# Patient Record
Sex: MALE | Race: WHITE | NOT HISPANIC OR LATINO | Employment: FULL TIME | URBAN - METROPOLITAN AREA
[De-identification: names, ages, dates, MRNs, and addresses within clinical notes are randomized per-mention and may not be internally consistent; named-entity substitution may affect disease eponyms.]

---

## 2017-08-02 ENCOUNTER — OFFICE VISIT (OUTPATIENT)
Dept: NEUROLOGY | Facility: CLINIC | Age: 42
End: 2017-08-02

## 2017-08-02 VITALS
HEART RATE: 71 BPM | BODY MASS INDEX: 35.71 KG/M2 | WEIGHT: 249.4 LBS | HEIGHT: 70 IN | DIASTOLIC BLOOD PRESSURE: 86 MMHG | OXYGEN SATURATION: 98 % | SYSTOLIC BLOOD PRESSURE: 120 MMHG

## 2017-08-02 DIAGNOSIS — G47.33 OBSTRUCTIVE APNEA: Primary | ICD-10-CM

## 2017-08-02 PROCEDURE — 99212 OFFICE O/P EST SF 10 MIN: CPT | Performed by: PSYCHIATRY & NEUROLOGY

## 2017-08-02 RX ORDER — ALLOPURINOL 100 MG/1
TABLET ORAL
Refills: 0 | COMMUNITY
Start: 2017-06-13

## 2017-08-02 RX ORDER — RANITIDINE 150 MG/1
TABLET ORAL
Refills: 0 | COMMUNITY
Start: 2017-06-13 | End: 2021-01-07

## 2017-08-02 NOTE — PROGRESS NOTES
"Subjective:     Patient ID: Jono Mcintyre is a 41 y.o. male.    History of Present Illness     41 y.o.  male with JOANA returns in follow up.  Last visit 6/9/16 renewed CPAP mask and supplies.    Needs new DME provider due to vendor going out of business.  Wt is stable in last year.  Compliant with CPAP and receiving.  Using daily for an average of 7 to 8 hours a night.       The following portions of the patient's history were reviewed and updated as appropriate: allergies, current medications, past medical history, past surgical history and problem list.    Review of Systems   Constitutional: Positive for fatigue. Negative for activity change and unexpected weight change.   HENT: Negative for facial swelling, hearing loss, tinnitus, trouble swallowing and voice change.    Eyes: Negative for photophobia, pain and visual disturbance.   Respiratory: Negative for apnea, cough and choking.    Cardiovascular: Negative for chest pain.   Gastrointestinal: Negative for constipation, nausea and vomiting.   Endocrine: Negative for cold intolerance.   Genitourinary: Negative for difficulty urinating, frequency and urgency.   Musculoskeletal: Negative for arthralgias, back pain, gait problem, myalgias, neck pain and neck stiffness.   Skin: Negative for rash.   Allergic/Immunologic: Negative for immunocompromised state.   Neurological: Negative for dizziness, tremors, seizures, syncope, facial asymmetry, speech difficulty, weakness, light-headedness, numbness and headaches.   Hematological: Negative for adenopathy.   Psychiatric/Behavioral: Negative for confusion, decreased concentration, hallucinations and sleep disturbance. The patient is not nervous/anxious.         Objective:  Vitals:    08/02/17 0909   BP: 120/86   Pulse: 71   SpO2: 98%   Weight: 249 lb 6.4 oz (113 kg)   Height: 70\" (177.8 cm)           Neurologic Exam     Mental Status   Level of consciousness: alert  Knowledge: good and consistent with education. "   Normal comprehension.     Cranial Nerves   Cranial nerves II through XII intact.     CN II   Visual fields full to confrontation.   Visual acuity: normal  Right visual field deficit: none  Left visual field deficit: none     CN III, IV, VI   Nystagmus: none   Diplopia: none  Ophthalmoparesis: none  Upgaze: normal  Downgaze: normal  Conjugate gaze: present    CN V   Facial sensation intact.   Right corneal reflex: normal  Left corneal reflex: normal    CN VII   Right facial weakness: none  Left facial weakness: none    CN VIII   Hearing: intact    CN IX, X   Palate: symmetric  Right gag reflex: normal  Left gag reflex: normal    CN XI   Right sternocleidomastoid strength: normal  Left sternocleidomastoid strength: normal    CN XII   Tongue: not atrophic  Fasciculations: absent  Tongue deviation: none    Motor Exam   Muscle bulk: normal  Overall muscle tone: normal    Sensory Exam   Light touch normal.     Gait, Coordination, and Reflexes     Tremor   Resting tremor: absent  Intention tremor: absent  Action tremor: absent    Reflexes   Reflexes 2+ except as noted.       Physical Exam    Assessment/Plan:       Problems Addressed this Visit        Respiratory    Obstructive apnea - Primary     Compliant with CPAP and receiving benefit    Rx for new mask and supplies

## 2017-12-11 ENCOUNTER — TELEPHONE (OUTPATIENT)
Dept: NEUROLOGY | Facility: CLINIC | Age: 42
End: 2017-12-11

## 2017-12-11 NOTE — TELEPHONE ENCOUNTER
----- Message from Kassy Grace sent at 12/11/2017 11:51 AM EST -----  Regarding: PHYSICIAN ORDER  Contact: [phone]  Please send over new order for cpap supplies

## 2017-12-11 NOTE — TELEPHONE ENCOUNTER
Called patient and wanted to see where he gets his cpap supplies from. Leave message to return my call

## 2019-01-07 ENCOUNTER — OFFICE VISIT (OUTPATIENT)
Dept: NEUROLOGY | Facility: CLINIC | Age: 44
End: 2019-01-07

## 2019-01-07 VITALS
HEART RATE: 58 BPM | DIASTOLIC BLOOD PRESSURE: 90 MMHG | WEIGHT: 260 LBS | SYSTOLIC BLOOD PRESSURE: 132 MMHG | OXYGEN SATURATION: 98 % | HEIGHT: 70 IN | BODY MASS INDEX: 37.22 KG/M2 | RESPIRATION RATE: 20 BRPM

## 2019-01-07 DIAGNOSIS — G47.33 OBSTRUCTIVE APNEA: Primary | ICD-10-CM

## 2019-01-07 PROCEDURE — 99212 OFFICE O/P EST SF 10 MIN: CPT | Performed by: PSYCHIATRY & NEUROLOGY

## 2019-01-07 NOTE — PROGRESS NOTES
"Subjective:   Chief Complaint   Patient presents with   • Apnea       Patient ID: Jono Mcintyre is a 43 y.o. male.    History of Present Illness     43 y.o.  male with JOANA returns in follow up.  Last visit 8/2/167 renewed CPAP mask and supplies.    Pt concerned machine not generating enough pressure.      Using CPAP daily for 8 hours a day.      The following portions of the patient's history were reviewed and updated as appropriate: allergies, current medications, past medical history, past surgical history and problem list.    Review of Systems   Constitutional: Positive for fatigue. Negative for activity change and unexpected weight change.   HENT: Negative for facial swelling, hearing loss, tinnitus, trouble swallowing and voice change.    Eyes: Negative for photophobia, pain and visual disturbance.   Respiratory: Negative for apnea, cough and choking.    Cardiovascular: Negative for chest pain.   Gastrointestinal: Negative for constipation, nausea and vomiting.   Endocrine: Negative for cold intolerance.   Genitourinary: Negative for difficulty urinating, frequency and urgency.   Musculoskeletal: Negative for arthralgias, back pain, gait problem, myalgias, neck pain and neck stiffness.   Skin: Negative for rash.   Allergic/Immunologic: Negative for immunocompromised state.   Neurological: Negative for dizziness, tremors, seizures, syncope, facial asymmetry, speech difficulty, weakness, light-headedness, numbness and headaches.   Hematological: Negative for adenopathy.   Psychiatric/Behavioral: Positive for sleep disturbance. Negative for confusion, decreased concentration and hallucinations. The patient is not nervous/anxious.         Objective:  Vitals:    01/07/19 1127   BP: 132/90   Pulse: 58   Resp: 20   SpO2: 98%   Weight: 118 kg (260 lb)   Height: 177.8 cm (70\")           Neurologic Exam     Mental Status   Level of consciousness: alert  Knowledge: good and consistent with education.   Normal " comprehension.     Cranial Nerves   Cranial nerves II through XII intact.     CN II   Visual fields full to confrontation.   Visual acuity: normal  Right visual field deficit: none  Left visual field deficit: none     CN III, IV, VI   Nystagmus: none   Diplopia: none  Ophthalmoparesis: none  Upgaze: normal  Downgaze: normal  Conjugate gaze: present    CN V   Facial sensation intact.   Right corneal reflex: normal  Left corneal reflex: normal    CN VII   Right facial weakness: none  Left facial weakness: none    CN VIII   Hearing: intact    CN IX, X   Palate: symmetric  Right gag reflex: normal  Left gag reflex: normal    CN XI   Right sternocleidomastoid strength: normal  Left sternocleidomastoid strength: normal    CN XII   Tongue: not atrophic  Fasciculations: absent  Tongue deviation: none    Motor Exam   Muscle bulk: normal  Overall muscle tone: normal    Sensory Exam   Light touch normal.     Gait, Coordination, and Reflexes     Tremor   Resting tremor: absent  Intention tremor: absent  Action tremor: absent    Reflexes   Reflexes 2+ except as noted.       Physical Exam    Assessment/Plan:       Problems Addressed this Visit        Respiratory    Obstructive apnea - Primary     Send in order for new CPAP machine and supplies

## 2020-01-07 ENCOUNTER — OFFICE VISIT (OUTPATIENT)
Dept: NEUROLOGY | Facility: CLINIC | Age: 45
End: 2020-01-07

## 2020-01-07 VITALS
DIASTOLIC BLOOD PRESSURE: 90 MMHG | WEIGHT: 256 LBS | OXYGEN SATURATION: 98 % | HEIGHT: 70 IN | HEART RATE: 70 BPM | BODY MASS INDEX: 36.65 KG/M2 | SYSTOLIC BLOOD PRESSURE: 138 MMHG

## 2020-01-07 DIAGNOSIS — G47.33 OBSTRUCTIVE APNEA: Primary | ICD-10-CM

## 2020-01-07 PROCEDURE — 99213 OFFICE O/P EST LOW 20 MIN: CPT | Performed by: NURSE PRACTITIONER

## 2020-01-07 RX ORDER — FLUTICASONE PROPIONATE 50 MCG
SPRAY, SUSPENSION (ML) NASAL
COMMUNITY

## 2020-01-07 RX ORDER — CETIRIZINE HYDROCHLORIDE 10 MG/1
TABLET ORAL
COMMUNITY

## 2020-01-07 NOTE — PROGRESS NOTES
"Subjective:   Chief Complaint   Patient presents with   • Sleeping Problem     Obstructive Sleep Apnea; 1yr follow up         Patient ID: Jono Mcintyre is a 44 y.o. male.    History of Present Illness     44 y.o.  male with JOANA returns in follow up.  Last visit 1/7/19 renewed CPAP mask and supplies.  Current machine unknown age. Home health was previous supplier, now uses [facility].     Using CPAP daily for 8 hours a night, feeling rested during the day.      The following portions of the patient's history were reviewed and updated as appropriate: allergies, current medications, past medical history, past surgical history and problem list.    Review of Systems   Constitutional: Positive for fatigue. Negative for activity change and unexpected weight change.   HENT: Negative for facial swelling, hearing loss, tinnitus, trouble swallowing and voice change.    Eyes: Negative for photophobia, pain and visual disturbance.   Respiratory: Negative for apnea, cough and choking.    Cardiovascular: Negative for chest pain.   Gastrointestinal: Negative for constipation, nausea and vomiting.   Endocrine: Negative for cold intolerance.   Genitourinary: Negative for difficulty urinating, frequency and urgency.   Musculoskeletal: Negative for arthralgias, back pain, gait problem, myalgias, neck pain and neck stiffness.   Skin: Negative for rash.   Allergic/Immunologic: Negative for immunocompromised state.   Neurological: Negative for dizziness, tremors, seizures, syncope, facial asymmetry, speech difficulty, weakness, light-headedness, numbness and headaches.   Hematological: Negative for adenopathy.   Psychiatric/Behavioral: Positive for sleep disturbance. Negative for confusion, decreased concentration and hallucinations. The patient is not nervous/anxious.         Objective:  Vitals:    01/07/20 0857   BP: 138/90   Pulse: 70   SpO2: 98%   Weight: 116 kg (256 lb)   Height: 177.8 cm (70\")           Neurologic Exam "     Mental Status   Oriented to person, place, and time.   Level of consciousness: alert  Knowledge: good and consistent with education.   Normal comprehension.     Cranial Nerves   Cranial nerves II through XII intact.     CN II   Visual fields full to confrontation.   Visual acuity: normal  Right visual field deficit: none  Left visual field deficit: none     CN III, IV, VI   Nystagmus: none   Diplopia: none  Ophthalmoparesis: none  Upgaze: normal  Downgaze: normal  Conjugate gaze: present    CN V   Facial sensation intact.   Right corneal reflex: normal  Left corneal reflex: normal    CN VII   Right facial weakness: none  Left facial weakness: none    CN VIII   Hearing: intact    CN IX, X   Palate: symmetric  Right gag reflex: normal  Left gag reflex: normal    CN XI   Right sternocleidomastoid strength: normal  Left sternocleidomastoid strength: normal    CN XII   Tongue: not atrophic  Fasciculations: absent  Tongue deviation: none    Motor Exam   Muscle bulk: normal  Overall muscle tone: normal    Sensory Exam   Light touch normal.     Gait, Coordination, and Reflexes     Tremor   Resting tremor: absent  Intention tremor: absent  Action tremor: absent    Reflexes   Reflexes 2+ except as noted.       Physical Exam   Constitutional: He is oriented to person, place, and time. He appears well-developed and well-nourished.   HENT:   Head: Normocephalic and atraumatic.   Mouth/Throat: Uvula is midline.   Pulmonary/Chest: Effort normal.   Neurological: He is alert and oriented to person, place, and time.   Skin: Skin is warm and dry.   Psychiatric: He has a normal mood and affect. His behavior is normal.   Nursing note and vitals reviewed.      Assessment/Plan:       Problems Addressed this Visit        Respiratory    Obstructive apnea - Primary     Refilled mask and supplies                 Return in about 1 year (around 1/7/2021).

## 2020-09-12 ENCOUNTER — HOSPITAL ENCOUNTER (INPATIENT)
Facility: HOSPITAL | Age: 45
LOS: 5 days | Discharge: HOME HEALTH | End: 2020-09-18
Attending: EMERGENCY MEDICINE | Admitting: FAMILY MEDICINE

## 2020-09-12 DIAGNOSIS — R50.9 FEVER, UNSPECIFIED FEVER CAUSE: ICD-10-CM

## 2020-09-12 DIAGNOSIS — R06.00 DYSPNEA AND RESPIRATORY ABNORMALITIES: ICD-10-CM

## 2020-09-12 DIAGNOSIS — U07.1 COVID-19 VIRUS INFECTION: Primary | ICD-10-CM

## 2020-09-12 DIAGNOSIS — R09.02 HYPOXIA: ICD-10-CM

## 2020-09-12 DIAGNOSIS — R06.89 DYSPNEA AND RESPIRATORY ABNORMALITIES: ICD-10-CM

## 2020-09-12 DIAGNOSIS — J18.9 MULTIFOCAL PNEUMONIA: ICD-10-CM

## 2020-09-12 PROCEDURE — 99284 EMERGENCY DEPT VISIT MOD MDM: CPT

## 2020-09-12 RX ORDER — ACETAMINOPHEN 500 MG
1000 TABLET ORAL ONCE
Status: COMPLETED | OUTPATIENT
Start: 2020-09-12 | End: 2020-09-13

## 2020-09-13 ENCOUNTER — APPOINTMENT (OUTPATIENT)
Dept: CT IMAGING | Facility: HOSPITAL | Age: 45
End: 2020-09-13

## 2020-09-13 PROBLEM — K21.9 GERD WITHOUT ESOPHAGITIS: Status: ACTIVE | Noted: 2020-09-13

## 2020-09-13 PROBLEM — U07.1 COVID-19 VIRUS INFECTION: Status: ACTIVE | Noted: 2020-09-13

## 2020-09-13 PROBLEM — N17.9 AKI (ACUTE KIDNEY INJURY) (HCC): Status: ACTIVE | Noted: 2020-09-13

## 2020-09-13 PROBLEM — I10 HTN (HYPERTENSION): Status: ACTIVE | Noted: 2020-09-13

## 2020-09-13 LAB
ABO GROUP BLD: NORMAL
ABO GROUP BLD: NORMAL
ADV 40+41 DNA STL QL NAA+NON-PROBE: NOT DETECTED
ALBUMIN SERPL-MCNC: 4.1 G/DL (ref 3.5–5.2)
ALBUMIN SERPL-MCNC: 4.5 G/DL (ref 3.5–5.2)
ALBUMIN/GLOB SERPL: 1.2 G/DL
ALBUMIN/GLOB SERPL: 1.2 G/DL
ALP SERPL-CCNC: 61 U/L (ref 39–117)
ALP SERPL-CCNC: 65 U/L (ref 39–117)
ALT SERPL W P-5'-P-CCNC: 36 U/L (ref 1–41)
ALT SERPL W P-5'-P-CCNC: 39 U/L (ref 1–41)
ANION GAP SERPL CALCULATED.3IONS-SCNC: 13 MMOL/L (ref 5–15)
ANION GAP SERPL CALCULATED.3IONS-SCNC: 17 MMOL/L (ref 5–15)
AST SERPL-CCNC: 47 U/L (ref 1–40)
AST SERPL-CCNC: 48 U/L (ref 1–40)
ASTRO TYP 1-8 RNA STL QL NAA+NON-PROBE: NOT DETECTED
B PARAPERT DNA SPEC QL NAA+PROBE: NOT DETECTED
B PERT DNA SPEC QL NAA+PROBE: NOT DETECTED
BASOPHILS # BLD AUTO: 0 10*3/MM3 (ref 0–0.2)
BASOPHILS # BLD AUTO: 0.01 10*3/MM3 (ref 0–0.2)
BASOPHILS NFR BLD AUTO: 0 % (ref 0–1.5)
BASOPHILS NFR BLD AUTO: 0.1 % (ref 0–1.5)
BILIRUB SERPL-MCNC: 0.9 MG/DL (ref 0–1.2)
BILIRUB SERPL-MCNC: 1 MG/DL (ref 0–1.2)
BILIRUB UR QL STRIP: NEGATIVE
BLD GP AB SCN SERPL QL: NEGATIVE
BUN SERPL-MCNC: 19 MG/DL (ref 6–20)
BUN SERPL-MCNC: 19 MG/DL (ref 6–20)
BUN/CREAT SERPL: 13.7 (ref 7–25)
BUN/CREAT SERPL: 14 (ref 7–25)
C CAYETANENSIS DNA STL QL NAA+NON-PROBE: NOT DETECTED
C PNEUM DNA NPH QL NAA+NON-PROBE: NOT DETECTED
CALCIUM SPEC-SCNC: 9.2 MG/DL (ref 8.6–10.5)
CALCIUM SPEC-SCNC: 9.5 MG/DL (ref 8.6–10.5)
CAMPY SP DNA.DIARRHEA STL QL NAA+PROBE: NOT DETECTED
CHLORIDE SERPL-SCNC: 93 MMOL/L (ref 98–107)
CHLORIDE SERPL-SCNC: 98 MMOL/L (ref 98–107)
CLARITY UR: CLEAR
CO2 SERPL-SCNC: 20 MMOL/L (ref 22–29)
CO2 SERPL-SCNC: 22 MMOL/L (ref 22–29)
COLOR UR: YELLOW
CREAT SERPL-MCNC: 1.36 MG/DL (ref 0.76–1.27)
CREAT SERPL-MCNC: 1.39 MG/DL (ref 0.76–1.27)
CRP SERPL-MCNC: 17.3 MG/DL (ref 0–0.5)
CRYPTOSP STL CULT: NOT DETECTED
D DIMER PPP FEU-MCNC: 0.8 MCGFEU/ML (ref 0–0.56)
D-LACTATE SERPL-SCNC: 1 MMOL/L (ref 0.5–2)
D-LACTATE SERPL-SCNC: 2.1 MMOL/L (ref 0.5–2)
DEPRECATED RDW RBC AUTO: 41.6 FL (ref 37–54)
DEPRECATED RDW RBC AUTO: 41.9 FL (ref 37–54)
E COLI DNA SPEC QL NAA+PROBE: NOT DETECTED
E HISTOLYT AG STL-ACNC: NOT DETECTED
EAEC PAA PLAS AGGR+AATA ST NAA+NON-PRB: NOT DETECTED
EC STX1 + STX2 GENES STL NAA+PROBE: NOT DETECTED
EOSINOPHIL # BLD AUTO: 0 10*3/MM3 (ref 0–0.4)
EOSINOPHIL # BLD AUTO: 0 10*3/MM3 (ref 0–0.4)
EOSINOPHIL NFR BLD AUTO: 0 % (ref 0.3–6.2)
EOSINOPHIL NFR BLD AUTO: 0 % (ref 0.3–6.2)
EPEC EAE GENE STL QL NAA+NON-PROBE: NOT DETECTED
ERYTHROCYTE [DISTWIDTH] IN BLOOD BY AUTOMATED COUNT: 13.5 % (ref 12.3–15.4)
ERYTHROCYTE [DISTWIDTH] IN BLOOD BY AUTOMATED COUNT: 13.6 % (ref 12.3–15.4)
ETEC LTA+ST1A+ST1B TOX ST NAA+NON-PROBE: NOT DETECTED
FERRITIN SERPL-MCNC: 957.2 NG/ML (ref 30–400)
FLUAV H1 2009 PAND RNA NPH QL NAA+PROBE: NOT DETECTED
FLUAV H1 HA GENE NPH QL NAA+PROBE: NOT DETECTED
FLUAV H3 RNA NPH QL NAA+PROBE: NOT DETECTED
FLUAV SUBTYP SPEC NAA+PROBE: NOT DETECTED
FLUBV RNA ISLT QL NAA+PROBE: NOT DETECTED
G LAMBLIA DNA SPEC QL NAA+PROBE: NOT DETECTED
GFR SERPL CREATININE-BSD FRML MDRD: 55 ML/MIN/1.73
GFR SERPL CREATININE-BSD FRML MDRD: 57 ML/MIN/1.73
GLOBULIN UR ELPH-MCNC: 3.4 GM/DL
GLOBULIN UR ELPH-MCNC: 3.8 GM/DL
GLUCOSE SERPL-MCNC: 159 MG/DL (ref 65–99)
GLUCOSE SERPL-MCNC: 164 MG/DL (ref 65–99)
GLUCOSE UR STRIP-MCNC: NEGATIVE MG/DL
HADV DNA SPEC NAA+PROBE: NOT DETECTED
HCOV 229E RNA SPEC QL NAA+PROBE: NOT DETECTED
HCOV HKU1 RNA SPEC QL NAA+PROBE: NOT DETECTED
HCOV NL63 RNA SPEC QL NAA+PROBE: NOT DETECTED
HCOV OC43 RNA SPEC QL NAA+PROBE: NOT DETECTED
HCT VFR BLD AUTO: 39.2 % (ref 37.5–51)
HCT VFR BLD AUTO: 43.4 % (ref 37.5–51)
HGB BLD-MCNC: 13.1 G/DL (ref 13–17.7)
HGB BLD-MCNC: 14.1 G/DL (ref 13–17.7)
HGB UR QL STRIP.AUTO: NEGATIVE
HMPV RNA NPH QL NAA+NON-PROBE: NOT DETECTED
HPIV1 RNA SPEC QL NAA+PROBE: NOT DETECTED
HPIV2 RNA SPEC QL NAA+PROBE: NOT DETECTED
HPIV3 RNA NPH QL NAA+PROBE: NOT DETECTED
HPIV4 P GENE NPH QL NAA+PROBE: NOT DETECTED
IMM GRANULOCYTES # BLD AUTO: 0.03 10*3/MM3 (ref 0–0.05)
IMM GRANULOCYTES # BLD AUTO: 0.03 10*3/MM3 (ref 0–0.05)
IMM GRANULOCYTES NFR BLD AUTO: 0.3 % (ref 0–0.5)
IMM GRANULOCYTES NFR BLD AUTO: 0.4 % (ref 0–0.5)
KETONES UR QL STRIP: ABNORMAL
LACTATE HOLD SPECIMEN: NORMAL
LDH SERPL-CCNC: 318 U/L (ref 135–225)
LEUKOCYTE ESTERASE UR QL STRIP.AUTO: NEGATIVE
LYMPHOCYTES # BLD AUTO: 0.65 10*3/MM3 (ref 0.7–3.1)
LYMPHOCYTES # BLD AUTO: 1.01 10*3/MM3 (ref 0.7–3.1)
LYMPHOCYTES NFR BLD AUTO: 12 % (ref 19.6–45.3)
LYMPHOCYTES NFR BLD AUTO: 7.5 % (ref 19.6–45.3)
M PNEUMO IGG SER IA-ACNC: NOT DETECTED
MCH RBC QN AUTO: 27.5 PG (ref 26.6–33)
MCH RBC QN AUTO: 28 PG (ref 26.6–33)
MCHC RBC AUTO-ENTMCNC: 32.5 G/DL (ref 31.5–35.7)
MCHC RBC AUTO-ENTMCNC: 33.4 G/DL (ref 31.5–35.7)
MCV RBC AUTO: 83.8 FL (ref 79–97)
MCV RBC AUTO: 84.8 FL (ref 79–97)
MONOCYTES # BLD AUTO: 0.28 10*3/MM3 (ref 0.1–0.9)
MONOCYTES # BLD AUTO: 0.33 10*3/MM3 (ref 0.1–0.9)
MONOCYTES NFR BLD AUTO: 3.2 % (ref 5–12)
MONOCYTES NFR BLD AUTO: 3.9 % (ref 5–12)
NEUTROPHILS NFR BLD AUTO: 7.07 10*3/MM3 (ref 1.7–7)
NEUTROPHILS NFR BLD AUTO: 7.69 10*3/MM3 (ref 1.7–7)
NEUTROPHILS NFR BLD AUTO: 83.7 % (ref 42.7–76)
NEUTROPHILS NFR BLD AUTO: 88.9 % (ref 42.7–76)
NITRITE UR QL STRIP: NEGATIVE
NOROVIRUS GI+II RNA STL QL NAA+NON-PROBE: NOT DETECTED
NRBC BLD AUTO-RTO: 0 /100 WBC (ref 0–0.2)
NRBC BLD AUTO-RTO: 0 /100 WBC (ref 0–0.2)
P SHIGELLOIDES DNA STL QL NAA+PROBE: NOT DETECTED
PH UR STRIP.AUTO: 6 [PH] (ref 5–8)
PLATELET # BLD AUTO: 156 10*3/MM3 (ref 140–450)
PLATELET # BLD AUTO: 165 10*3/MM3 (ref 140–450)
PMV BLD AUTO: 11.5 FL (ref 6–12)
PMV BLD AUTO: 11.8 FL (ref 6–12)
POTASSIUM SERPL-SCNC: 3.8 MMOL/L (ref 3.5–5.2)
POTASSIUM SERPL-SCNC: 4 MMOL/L (ref 3.5–5.2)
PROCALCITONIN SERPL-MCNC: 1.11 NG/ML (ref 0–0.25)
PROT SERPL-MCNC: 7.5 G/DL (ref 6–8.5)
PROT SERPL-MCNC: 8.3 G/DL (ref 6–8.5)
PROT UR QL STRIP: ABNORMAL
RBC # BLD AUTO: 4.68 10*6/MM3 (ref 4.14–5.8)
RBC # BLD AUTO: 5.12 10*6/MM3 (ref 4.14–5.8)
RH BLD: POSITIVE
RH BLD: POSITIVE
RHINOVIRUS RNA SPEC NAA+PROBE: NOT DETECTED
RSV RNA NPH QL NAA+NON-PROBE: NOT DETECTED
RV RNA STL NAA+PROBE: NOT DETECTED
SALMONELLA DNA SPEC QL NAA+PROBE: NOT DETECTED
SAPO I+II+IV+V RNA STL QL NAA+NON-PROBE: NOT DETECTED
SARS-COV-2 RNA NPH QL NAA+NON-PROBE: DETECTED
SHIGELLA SP+EIEC IPAH STL QL NAA+PROBE: NOT DETECTED
SODIUM SERPL-SCNC: 131 MMOL/L (ref 136–145)
SODIUM SERPL-SCNC: 132 MMOL/L (ref 136–145)
SP GR UR STRIP: 1.05 (ref 1–1.03)
T&S EXPIRATION DATE: NORMAL
UROBILINOGEN UR QL STRIP: ABNORMAL
V CHOLERAE DNA SPEC QL NAA+PROBE: NOT DETECTED
VIBRIO DNA SPEC NAA+PROBE: NOT DETECTED
WBC # BLD AUTO: 8.44 10*3/MM3 (ref 3.4–10.8)
WBC # BLD AUTO: 8.66 10*3/MM3 (ref 3.4–10.8)
YERSINIA STL CULT: NOT DETECTED

## 2020-09-13 PROCEDURE — 0097U HC BIOFIRE FILMARRAY GI PANEL: CPT | Performed by: NURSE PRACTITIONER

## 2020-09-13 PROCEDURE — 84145 PROCALCITONIN (PCT): CPT | Performed by: EMERGENCY MEDICINE

## 2020-09-13 PROCEDURE — 86900 BLOOD TYPING SEROLOGIC ABO: CPT | Performed by: NURSE PRACTITIONER

## 2020-09-13 PROCEDURE — 85379 FIBRIN DEGRADATION QUANT: CPT | Performed by: EMERGENCY MEDICINE

## 2020-09-13 PROCEDURE — 0 IOPAMIDOL PER 1 ML: Performed by: EMERGENCY MEDICINE

## 2020-09-13 PROCEDURE — 83615 LACTATE (LD) (LDH) ENZYME: CPT | Performed by: EMERGENCY MEDICINE

## 2020-09-13 PROCEDURE — 81003 URINALYSIS AUTO W/O SCOPE: CPT | Performed by: INTERNAL MEDICINE

## 2020-09-13 PROCEDURE — 25010000002 DEXAMETHASONE SODIUM PHOSPHATE 20 MG/5ML SOLUTION: Performed by: EMERGENCY MEDICINE

## 2020-09-13 PROCEDURE — 82728 ASSAY OF FERRITIN: CPT | Performed by: EMERGENCY MEDICINE

## 2020-09-13 PROCEDURE — 85025 COMPLETE CBC W/AUTO DIFF WBC: CPT | Performed by: NURSE PRACTITIONER

## 2020-09-13 PROCEDURE — 25010000002 HEPARIN (PORCINE) PER 1000 UNITS: Performed by: NURSE PRACTITIONER

## 2020-09-13 PROCEDURE — 86140 C-REACTIVE PROTEIN: CPT | Performed by: EMERGENCY MEDICINE

## 2020-09-13 PROCEDURE — 83605 ASSAY OF LACTIC ACID: CPT | Performed by: EMERGENCY MEDICINE

## 2020-09-13 PROCEDURE — 86900 BLOOD TYPING SEROLOGIC ABO: CPT

## 2020-09-13 PROCEDURE — 86901 BLOOD TYPING SEROLOGIC RH(D): CPT | Performed by: NURSE PRACTITIONER

## 2020-09-13 PROCEDURE — 83520 IMMUNOASSAY QUANT NOS NONAB: CPT | Performed by: INTERNAL MEDICINE

## 2020-09-13 PROCEDURE — 85025 COMPLETE CBC W/AUTO DIFF WBC: CPT | Performed by: EMERGENCY MEDICINE

## 2020-09-13 PROCEDURE — 25010000002 ENOXAPARIN PER 10 MG: Performed by: INTERNAL MEDICINE

## 2020-09-13 PROCEDURE — 99223 1ST HOSP IP/OBS HIGH 75: CPT | Performed by: INTERNAL MEDICINE

## 2020-09-13 PROCEDURE — 80053 COMPREHEN METABOLIC PANEL: CPT | Performed by: NURSE PRACTITIONER

## 2020-09-13 PROCEDURE — 80053 COMPREHEN METABOLIC PANEL: CPT | Performed by: EMERGENCY MEDICINE

## 2020-09-13 PROCEDURE — 86901 BLOOD TYPING SEROLOGIC RH(D): CPT

## 2020-09-13 PROCEDURE — 86850 RBC ANTIBODY SCREEN: CPT | Performed by: NURSE PRACTITIONER

## 2020-09-13 PROCEDURE — 0202U NFCT DS 22 TRGT SARS-COV-2: CPT | Performed by: INTERNAL MEDICINE

## 2020-09-13 PROCEDURE — 71275 CT ANGIOGRAPHY CHEST: CPT

## 2020-09-13 RX ORDER — SODIUM CHLORIDE 0.9 % (FLUSH) 0.9 %
10 SYRINGE (ML) INJECTION AS NEEDED
Status: DISCONTINUED | OUTPATIENT
Start: 2020-09-13 | End: 2020-09-18 | Stop reason: HOSPADM

## 2020-09-13 RX ORDER — HEPARIN SODIUM 5000 [USP'U]/ML
5000 INJECTION, SOLUTION INTRAVENOUS; SUBCUTANEOUS EVERY 8 HOURS SCHEDULED
Status: DISCONTINUED | OUTPATIENT
Start: 2020-09-13 | End: 2020-09-13

## 2020-09-13 RX ORDER — ASCORBIC ACID 500 MG
1000 TABLET ORAL DAILY
Status: DISCONTINUED | OUTPATIENT
Start: 2020-09-13 | End: 2020-09-13

## 2020-09-13 RX ORDER — ASCORBIC ACID 500 MG
1000 TABLET ORAL 4 TIMES DAILY
Status: DISCONTINUED | OUTPATIENT
Start: 2020-09-13 | End: 2020-09-18 | Stop reason: HOSPADM

## 2020-09-13 RX ORDER — SODIUM CHLORIDE 9 MG/ML
75 INJECTION, SOLUTION INTRAVENOUS CONTINUOUS
Status: ACTIVE | OUTPATIENT
Start: 2020-09-13 | End: 2020-09-13

## 2020-09-13 RX ORDER — DEXAMETHASONE SODIUM PHOSPHATE 4 MG/ML
6 INJECTION, SOLUTION INTRA-ARTICULAR; INTRALESIONAL; INTRAMUSCULAR; INTRAVENOUS; SOFT TISSUE DAILY
Status: DISCONTINUED | OUTPATIENT
Start: 2020-09-14 | End: 2020-09-13

## 2020-09-13 RX ORDER — ACETAMINOPHEN 325 MG/1
325 TABLET ORAL ONCE
Status: COMPLETED | OUTPATIENT
Start: 2020-09-14 | End: 2020-09-13

## 2020-09-13 RX ORDER — BENZONATATE 100 MG/1
200 CAPSULE ORAL 3 TIMES DAILY PRN
Status: DISCONTINUED | OUTPATIENT
Start: 2020-09-13 | End: 2020-09-18 | Stop reason: HOSPADM

## 2020-09-13 RX ORDER — FAMOTIDINE 20 MG/1
20 TABLET, FILM COATED ORAL
Status: DISCONTINUED | OUTPATIENT
Start: 2020-09-13 | End: 2020-09-18 | Stop reason: HOSPADM

## 2020-09-13 RX ORDER — SODIUM CHLORIDE 0.9 % (FLUSH) 0.9 %
10 SYRINGE (ML) INJECTION EVERY 12 HOURS SCHEDULED
Status: DISCONTINUED | OUTPATIENT
Start: 2020-09-13 | End: 2020-09-18 | Stop reason: HOSPADM

## 2020-09-13 RX ORDER — CHOLECALCIFEROL (VITAMIN D3) 125 MCG
10 CAPSULE ORAL ONCE
Status: COMPLETED | OUTPATIENT
Start: 2020-09-13 | End: 2020-09-13

## 2020-09-13 RX ORDER — ACETAMINOPHEN 160 MG/5ML
650 SOLUTION ORAL EVERY 4 HOURS PRN
Status: DISCONTINUED | OUTPATIENT
Start: 2020-09-13 | End: 2020-09-18 | Stop reason: HOSPADM

## 2020-09-13 RX ORDER — ACETAMINOPHEN 325 MG/1
650 TABLET ORAL EVERY 4 HOURS PRN
Status: DISCONTINUED | OUTPATIENT
Start: 2020-09-13 | End: 2020-09-18 | Stop reason: HOSPADM

## 2020-09-13 RX ORDER — DEXAMETHASONE 4 MG/1
6 TABLET ORAL
Status: DISCONTINUED | OUTPATIENT
Start: 2020-09-14 | End: 2020-09-14

## 2020-09-13 RX ORDER — ACETAMINOPHEN 650 MG/1
650 SUPPOSITORY RECTAL EVERY 4 HOURS PRN
Status: DISCONTINUED | OUTPATIENT
Start: 2020-09-13 | End: 2020-09-18 | Stop reason: HOSPADM

## 2020-09-13 RX ORDER — DEXAMETHASONE IN 0.9 % SOD CHL 10 MG/50ML
10 INTRAVENOUS SOLUTION, PIGGYBACK (ML) INTRAVENOUS ONCE
Status: COMPLETED | OUTPATIENT
Start: 2020-09-13 | End: 2020-09-13

## 2020-09-13 RX ORDER — FAMOTIDINE 20 MG/1
20 TABLET, FILM COATED ORAL DAILY
Status: DISCONTINUED | OUTPATIENT
Start: 2020-09-13 | End: 2020-09-13

## 2020-09-13 RX ADMIN — ENOXAPARIN SODIUM 40 MG: 40 INJECTION SUBCUTANEOUS at 12:11

## 2020-09-13 RX ADMIN — OXYCODONE HYDROCHLORIDE AND ACETAMINOPHEN 1000 MG: 500 TABLET ORAL at 19:49

## 2020-09-13 RX ADMIN — IOPAMIDOL 100 ML: 755 INJECTION, SOLUTION INTRAVENOUS at 02:04

## 2020-09-13 RX ADMIN — BENZONATATE 200 MG: 100 CAPSULE ORAL at 08:40

## 2020-09-13 RX ADMIN — ACETAMINOPHEN 500 MG: 500 TABLET, FILM COATED ORAL at 02:54

## 2020-09-13 RX ADMIN — HEPARIN SODIUM 5000 UNITS: 5000 INJECTION, SOLUTION INTRAVENOUS; SUBCUTANEOUS at 05:24

## 2020-09-13 RX ADMIN — OXYCODONE HYDROCHLORIDE AND ACETAMINOPHEN 1000 MG: 500 TABLET ORAL at 12:11

## 2020-09-13 RX ADMIN — OXYCODONE HYDROCHLORIDE AND ACETAMINOPHEN 1000 MG: 500 TABLET ORAL at 17:16

## 2020-09-13 RX ADMIN — SODIUM CHLORIDE, PRESERVATIVE FREE 10 ML: 5 INJECTION INTRAVENOUS at 08:44

## 2020-09-13 RX ADMIN — MELATONIN TAB 5 MG 10 MG: 5 TAB at 21:34

## 2020-09-13 RX ADMIN — SODIUM CHLORIDE 75 ML/HR: 9 INJECTION, SOLUTION INTRAVENOUS at 05:17

## 2020-09-13 RX ADMIN — FAMOTIDINE 20 MG: 20 TABLET, FILM COATED ORAL at 08:40

## 2020-09-13 RX ADMIN — BENZONATATE 200 MG: 100 CAPSULE ORAL at 19:49

## 2020-09-13 RX ADMIN — SODIUM CHLORIDE 75 ML/HR: 9 INJECTION, SOLUTION INTRAVENOUS at 12:11

## 2020-09-13 RX ADMIN — ACETAMINOPHEN 650 MG: 325 TABLET, FILM COATED ORAL at 19:49

## 2020-09-13 RX ADMIN — FAMOTIDINE 20 MG: 20 TABLET, FILM COATED ORAL at 17:16

## 2020-09-13 RX ADMIN — ACETAMINOPHEN 325 MG: 325 TABLET, FILM COATED ORAL at 23:43

## 2020-09-13 RX ADMIN — DEXAMETHASONE SODIUM PHOSPHATE 10 MG: 4 INJECTION, SOLUTION INTRA-ARTICULAR; INTRALESIONAL; INTRAMUSCULAR; INTRAVENOUS; SOFT TISSUE at 02:54

## 2020-09-13 NOTE — ED PROVIDER NOTES
EMERGENCY DEPARTMENT ENCOUNTER      Pt Name: Jono Mcintyre  MRN: [id number]  YOB: 1975  Date of evaluation: 9/12/2020  Provider: Lior Pablo DO    CHIEF COMPLAINT       Chief Complaint   Patient presents with   • COVID POSITIVE         HISTORY OF PRESENT ILLNESS  (Location/Symptom, Timing/Onset, Context/Setting, Quality, Duration, Modifying Factors, Severity.)   Jono Mcintyre is a 44 y.o. male who presents to the emergency department for evaluation of mild shortness of breath, recent diagnosis of coronavirus as he had the results of a positive finding yesterday.  The patient states over the last few days he has had issues with sleeping secondary to continued fever, overall not feeling well.  He does have a sleep apnea and states that using his machine at night he has not been able to get a good amount of sleep, concerned secondary to this.  Denies any shortness of breath, nausea loss of taste or smell.  Denies any respiratory issues in the past.  Is a non-smoker.  He does not wear home oxygen.  He denies any chest pain, hemoptysis, significant respiratory distress or abdominal pain.  He has had multiple loose stools over the last few days.  Otherwise the patient states he is doing well.  He denies any other acute systemic complaints this time.      Nursing notes were reviewed.    REVIEW OF SYSTEMS    (2-9 systems for level 4, 10 or more for level 5)   ROS:  General:  + fevers,  chills, no weakness  Cardiovascular:  No chest pain, no palpitations  Respiratory:  No shortness of breath, + cough, no wheezing  Gastrointestinal:  No pain, no nausea, no vomiting, no diarrhea  Musculoskeletal:  No muscle pain, no joint pain  Skin:  No rash, no easy bruising  Neurologic:  No speech problems, no headache, no extremity numbness, no extremity tingling, no extremity weakness  Psychiatric:  No anxiety  Genitourinary:  No dysuria, no hematuria    Except as noted above the remainder of the review of  systems was reviewed and negative.       PAST MEDICAL HISTORY     Past Medical History:   Diagnosis Date   • Hyperlipidemia    • Hypertension          SURGICAL HISTORY     History reviewed. No pertinent surgical history.      CURRENT MEDICATIONS       Current Facility-Administered Medications:   •  acetaminophen (TYLENOL) tablet 1,000 mg, 1,000 mg, Oral, Once, Lior Pablo,   •  Dexamethasone Sod Phos-NaCl 10-0.9 MG/50ML-% IVPB solution 10 mg, 10 mg, Intravenous, Once, Lior Pablo,     Current Outpatient Medications:   •  allopurinol (ZYLOPRIM) 100 MG tablet, , Disp: , Rfl: 0  •  benazepril (LOTENSIN) 20 MG tablet, Take 20 mg by mouth daily., Disp: , Rfl:   •  cetirizine (ZYRTEC ALLERGY) 10 MG tablet, Zyrtec 10 mg tablet  Take 1 tablet every day by oral route., Disp: , Rfl:   •  fluticasone (FLONASE ALLERGY RELIEF) 50 MCG/ACT nasal spray, Flonase Allergy Relief 50 mcg/actuation nasal spray,suspension  Spray 1 spray every day by intranasal route as needed for 7 days., Disp: , Rfl:   •  raNITIdine (ZANTAC) 150 MG tablet, , Disp: , Rfl: 0    ALLERGIES     No known drug allergy    FAMILY HISTORY       Family History   Problem Relation Age of Onset   • Hypertension Father    • Hypertension Other    • Heart disease Other    • Stroke Other    • Diabetes Other    • Alzheimer's disease Other           SOCIAL HISTORY       Social History     Socioeconomic History   • Marital status:      Spouse name: Not on file   • Number of children: Not on file   • Years of education: Not on file   • Highest education level: Not on file   Tobacco Use   • Smoking status: Never Smoker   • Smokeless tobacco: Never Used   Substance and Sexual Activity   • Alcohol use: Yes         PHYSICAL EXAM    (up to 7 for level 4, 8 or more for level 5)     Vitals:    09/12/20 2335 09/13/20 0000 09/13/20 0100 09/13/20 0114   BP: 146/79 149/79 (!) 149/108    BP Location: Right arm      Patient Position: Lying      Pulse: 95      "  Resp: 16      Temp: (!) 100.9 °F (38.3 °C)      TempSrc: Oral      SpO2: 95% 95% 93% 94%   Weight: 109 kg (241 lb)      Height: 177.8 cm (70\")          Physical Exam  General : Patient is awake, alert, oriented, in no acute distress, nontoxic appearing  HEENT: Pupils are equally round and reactive to light, EOMI, conjunctivae clear, sclerae white  Neck: Neck is supple, full range of motion, trachea midline  Cardiac: Heart regular rate, rhythm  Lungs: Lungs with no acute respiratory distress, no accessory muscle usage, no audible stridor or wheezing  Abdomen: Abdomen is soft, nondistended.  Musculoskeletal: 5 out of 5 strength in all 4 extremities.  No focal muscle deficits are appreciated  Neuro: Motor intact, sensory intact, level of consciousness is normal, GCS 15  Dermatology: Skin is warm and dry  Psych: Mentation is grossly normal, cognition is grossly normal. Affect is appropriate.      DIAGNOSTIC RESULTS     EKG: All EKG's are interpreted by the Emergency Department Physician who either signs or Co-signs this chart in the absence of a cardiologist.    No orders to display       RADIOLOGY:   Non-plain film images such as CT, Ultrasound and MRI are read by the radiologist. Plain radiographic images are visualized and preliminarily interpreted by the emergency physician with the below findings:      [] Radiologist's Report Reviewed:  CT Chest Pulmonary Embolism   Final Result   Bilateral multifocal pneumonia. No evidence of pulmonary embolism. No evidence of pleural or pericardial fluid. Hepatic steatosis is also seen.      Signer Name: Pablo Guerra MD    Signed: 9/13/2020 2:16 AM    Workstation Name: [id number]     [facility]            ED BEDSIDE ULTRASOUND:   Performed by ED Physician - none    LABS:    I have reviewed and interpreted all of the currently available lab results from this visit (if applicable):  Results for orders placed or performed during the hospital encounter of " 09/12/20   Comprehensive Metabolic Panel    Specimen: Blood   Result Value Ref Range    Glucose 164 (H) 65 - 99 mg/dL    BUN 19 6 - 20 mg/dL    Creatinine 1.39 (H) 0.76 - 1.27 mg/dL    Sodium 132 (L) 136 - 145 mmol/L    Potassium 4.0 3.5 - 5.2 mmol/L    Chloride 93 (L) 98 - 107 mmol/L    CO2 22.0 22.0 - 29.0 mmol/L    Calcium 9.5 8.6 - 10.5 mg/dL    Total Protein 8.3 6.0 - 8.5 g/dL    Albumin 4.50 3.50 - 5.20 g/dL    ALT (SGPT) 39 1 - 41 U/L    AST (SGOT) 47 (H) 1 - 40 U/L    Alkaline Phosphatase 65 39 - 117 U/L    Total Bilirubin 1.0 0.0 - 1.2 mg/dL    eGFR Non African Amer 55 (L) >60 mL/min/1.73    Globulin 3.8 gm/dL    A/G Ratio 1.2 g/dL    BUN/Creatinine Ratio 13.7 7.0 - 25.0    Anion Gap 17.0 (H) 5.0 - 15.0 mmol/L   Lactate Dehydrogenase    Specimen: Blood   Result Value Ref Range     (H) 135 - 225 U/L   Procalcitonin    Specimen: Blood   Result Value Ref Range    Procalcitonin 1.11 (H) 0.00 - 0.25 ng/mL   D-dimer, Quantitative    Specimen: Blood   Result Value Ref Range    D-Dimer, Quantitative 0.80 (H) 0.00 - 0.56 MCGFEU/mL   C-reactive Protein    Specimen: Blood   Result Value Ref Range    C-Reactive Protein 17.30 (H) 0.00 - 0.50 mg/dL   Lactic Acid, Plasma    Specimen: Blood   Result Value Ref Range    Lactate 2.1 (C) 0.5 - 2.0 mmol/L   Ferritin    Specimen: Blood   Result Value Ref Range    Ferritin 957.20 (H) 30.00 - 400.00 ng/mL   CBC Auto Differential    Specimen: Blood   Result Value Ref Range    WBC 8.44 3.40 - 10.80 10*3/mm3    RBC 5.12 4.14 - 5.80 10*6/mm3    Hemoglobin 14.1 13.0 - 17.7 g/dL    Hematocrit 43.4 37.5 - 51.0 %    MCV 84.8 79.0 - 97.0 fL    MCH 27.5 26.6 - 33.0 pg    MCHC 32.5 31.5 - 35.7 g/dL    RDW 13.5 12.3 - 15.4 %    RDW-SD 41.9 37.0 - 54.0 fl    MPV 11.8 6.0 - 12.0 fL    Platelets 165 140 - 450 10*3/mm3    Neutrophil % 83.7 (H) 42.7 - 76.0 %    Lymphocyte % 12.0 (L) 19.6 - 45.3 %    Monocyte % 3.9 (L) 5.0 - 12.0 %    Eosinophil % 0.0 (L) 0.3 - 6.2 %    Basophil % 0.0 0.0 -  "1.5 %    Immature Grans % 0.4 0.0 - 0.5 %    Neutrophils, Absolute 7.07 (H) 1.70 - 7.00 10*3/mm3    Lymphocytes, Absolute 1.01 0.70 - 3.10 10*3/mm3    Monocytes, Absolute 0.33 0.10 - 0.90 10*3/mm3    Eosinophils, Absolute 0.00 0.00 - 0.40 10*3/mm3    Basophils, Absolute 0.00 0.00 - 0.20 10*3/mm3    Immature Grans, Absolute 0.03 0.00 - 0.05 10*3/mm3    nRBC 0.0 0.0 - 0.2 /100 WBC        All other labs were within normal range or not returned as of this dictation.      EMERGENCY DEPARTMENT COURSE and DIFFERENTIAL DIAGNOSIS/MDM:   Vitals:    Vitals:    09/12/20 2335 09/13/20 0000 09/13/20 0100 09/13/20 0114   BP: 146/79 149/79 (!) 149/108    BP Location: Right arm      Patient Position: Lying      Pulse: 95      Resp: 16      Temp: (!) 100.9 °F (38.3 °C)      TempSrc: Oral      SpO2: 95% 95% 93% 94%   Weight: 109 kg (241 lb)      Height: 177.8 cm (70\")             Patient with recent positive exposure diagnosis with COVID-19.  Presents with a continued fever, generalized weakness difficulty with sleeping secondary to his underlying symptoms from the virus.  Is not have any acute respiratory distress, on room air the patient dock saturations are in the mid 90s.  Heart rate is stable, temperature 100.9.  We discussed secondary to his continued symptoms obtaining IV, labs, advanced imaging for further work-up and evaluation.  Lab work is consistent with underlying COVID-19 infection inflammatory process.  CT of the chest does not reveal any pulmonary embolism or dissection, it is consistent with COVID-19 multifocal pneumonia.  Patient DrWendy saturations ranged from 90 to 94%.  Secondary to his symptoms dyspnea and fever intermittent hypoxia the patient is at moderate risk, he would feel more comfortable in the hospital for further work-up treatments and monitoring.  I feel this is reasonable.  Case discussed with our hospitalist team for admission.        MEDICATIONS ADMINISTERED IN ED:  Medications   acetaminophen (TYLENOL) " tablet 1,000 mg (1,000 mg Oral Not Given 9/13/20 0006)   Dexamethasone Sod Phos-NaCl 10-0.9 MG/50ML-% IVPB solution 10 mg (has no administration in time range)   iopamidol (ISOVUE-370) 76 % injection 100 mL (100 mL Intravenous Given 9/13/20 0204)       PROCEDURES:  Procedures    CRITICAL CARE TIME    Total Critical Care time was 0 minutes, excluding separately reportable procedures.   There was a high probability of clinically significant/life threatening deterioration in the patient's condition which required my urgent intervention.      FINAL IMPRESSION      1. COVID-19 virus infection    2. Dyspnea and respiratory abnormalities    3. Hypoxia    4. Multifocal pneumonia    5. Fever, unspecified fever cause          DISPOSITION/PLAN     ED Disposition     ED Disposition Condition Comment    Decision to Admit            PATIENT REFERRED TO:  No follow-up provider specified.    DISCHARGE MEDICATIONS:     Medication List      ASK your doctor about these medications    allopurinol 100 MG tablet  Commonly known as: ZYLOPRIM     benazepril 20 MG tablet  Commonly known as: LOTENSIN     Flonase Allergy Relief 50 MCG/ACT nasal spray  Generic drug: fluticasone     raNITIdine 150 MG tablet  Commonly known as: ZANTAC     ZyrTEC Allergy 10 MG tablet  Generic drug: cetirizine                Comment: Please note this report has been produced using speech recognition software.      Lior Pablo DO  Attending Emergency Physician               Lior Pablo DO  09/13/20 [id number]

## 2020-09-13 NOTE — PLAN OF CARE
"  Problem: Adult Inpatient Plan of Care  Goal: Plan of Care Review  Outcome: Ongoing, Progressing  Flowsheets  Taken 9/13/2020 1940 by Kelly Chew, RN  Progress: no change  Outcome Summary: VSS, alert and oriented on room air. Pt without shortness of air (O2 >92%), refusing a CPAP since he \"has a mask at home and it isn't helping.\" Pt refused remdesivir and convalescent plasma, then requested for later today but informed that plasma only comes once a day. Pt and family upset because they \"need to know if he needs to get competent care at .\" Pt reeducated by charge nurse regarding oxygen therapy and availability of plasma, wife present via cell phone speaker phone. Pt verbalized understanding.  Taken 9/13/2020 0516 by Amberly Lima, RN  Plan of Care Reviewed With: patient     "

## 2020-09-13 NOTE — H&P
[facility] Medicine Services  HISTORY AND PHYSICAL    Patient Name: Jono Mcintyre  : 1975  MRN: [id number]  Primary Care Physician: Freddie Zarco MD  Date of admission: 2020      Subjective   Subjective     Chief Complaint:    Dyspnea.  (History obtained through phone)    HPI:  Jono Mcintyre is a 44 y.o. male with known history of recent COVID 19+ test, at [facility]- yesterday.  Presented to ED secondary to chief complaint of exertional dyspnea, dry cough fever, insomnia-symptoms started on Tuesday, progressively got worse, did not have any relief from fever.  Also could not tolerate his BiPAP machine at night for sleep apnea.  Also had diarrhea-without any nausea vomiting or abdominal cramping-denied any complaint of lightheadedness, generalized weakness, myalgia, no complaint of sore throat, no complaint of headache.  Likely sick contact at the baseball game as per patient.  Denies any complaint of chest pain, no bleeding per GI or , no focal neurological weakness,  Patient's oxygen sats around 93% on room air at rest.    In ER patient got Tylenol 1 g, dexamethasone 10 mg  Review of Systems   Complete ROS done, which is negative except as above and HPI.  Old records reviewed and summarized in PM hx      Personal History     Past Medical History:   Diagnosis Date   • Hyperlipidemia-not on any medication      • Hypertension- benazepril 20 mg p.o. daily    Gout-allopurinol as needed    GERD-Zantac 150 milligrams as needed    Obstructive sleep apnea-on CPAP.            History reviewed. No pertinent surgical history.    Family History: family history includes Alzheimer's disease in an other family member; Diabetes in an other family member; Heart disease in an other family member; Hypertension in his father and another family member; Stroke in an other family member. Otherwise pertinent FHx was reviewed and unremarkable.     Social History:  reports that he has  never smoked. He has never used smokeless tobacco. He reports current alcohol use.      Medications:  Available home medication information reviewed.  (Not in a hospital admission)      Allergies   Allergen Reactions   • No Known Drug Allergy        Objective   Objective     Vital Signs:   Temp:  [100.9 °F (38.3 °C)] 100.9 °F (38.3 °C)  Heart Rate:  [95] 95  Resp:  [16] 16- 92% on room air  BP: (146-149)/() 149/108        Physical Exam (not done physically, obtained from ED physician/nurse evaluation)    GENERAL-mild respiratory distress with exertion.  RS-bilateral basal coarse breath sounds  CVS- s1s2 Regular, tachycardic no murmur.  ABD-nontender, soft  EXT- no edema.  NEURO- AAO-3,, no gross sensory deficit, cranial nerves intact.  EYES- Conjunctivae are normal. Pupils are equal, round, and reactive to light. No scleral icterus.   ENT- no external ear nose lesions, mucosa dry.  NECK-supple, trachea midline  JOINTS/MSK- no deformity, no swelling.  SKIN- no rash , warm to touch.  PSYCHIATRIC- Normal mood and affect. Behavior is normal. Thought content normal.     Results Reviewed:  I have personally reviewed current lab and radiology data.    Results from last 7 days   Lab Units 09/13/20  0002   WBC 10*3/mm3 8.44   HEMOGLOBIN g/dL 14.1   HEMATOCRIT % 43.4   PLATELETS 10*3/mm3 165     Results from last 7 days   Lab Units 09/13/20  0002   SODIUM mmol/L 132*   POTASSIUM mmol/L 4.0   CHLORIDE mmol/L 93*   CO2 mmol/L 22.0   BUN mg/dL 19   CREATININE mg/dL 1.39*   GLUCOSE mg/dL 164*   CALCIUM mg/dL 9.5   ALT (SGPT) U/L 39   AST (SGOT) U/L 47*   LACTATE mmol/L 2.1*   PROCALCITONIN ng/mL 1.11*     Estimated Creatinine Clearance: 83 mL/min (A) (by C-G formula based on SCr of 1.39 mg/dL (H)).  Brief Urine Lab Results     None        Imaging Results (Last 24 Hours)     Procedure Component Value Units Date/Time    CT Chest Pulmonary Embolism [[id number]] Collected: 09/13/20 0216     Updated: 09/13/20 0218    Narrative:       CT CHEST PULMONARY EMBOLISM W CONTRAST    INDICATION:   Pneumonia with fever    TECHNIQUE:   CT angiogram of the chest with 100 cc of Isovue-300 IV contrast. 3-D reconstructions were obtained and reviewed.   Radiation dose reduction techniques included automated exposure control or exposure modulation based on body size. Count of known CT and  cardiac nuc med studies performed in previous 12 months: 0.     COMPARISON:   None available.    FINDINGS:   Chest images at mediastinal window show no adenopathy or effusions. There are no pulmonary artery filling defects to suggest emboli. Hepatic steatosis is noted. The CT angiographic images also show no evidence of emboli.    Chest images at lung window show multifocal groundglass infiltrates more prominent in the mid to lower lung fields and peripherally. Commonly reported imaging features of COVID-19 pneumonia are present. Other processes such as influenza pneumonia and  organizing pneumonia can cause a similar imaging pattern.      Impression:      Bilateral multifocal pneumonia. No evidence of pulmonary embolism. No evidence of pleural or pericardial fluid. Hepatic steatosis is also seen.    Signer Name: Pablo Guerra MD   Signed: 9/13/2020 2:16 AM   Workstation [facility]: [email or web address]    [facility]             Ekg-  CT chest PE protocol-bilateral multifocal pneumonia consistent with COVID-19 pneumonia.  LDH-318, ferritin 957, CRP 17, lactic acid 2.1, pro-Chema 1.1, d-dimer 0.8  WBC 8, hemoglobin 14, neutrophils 83, lymphocyte count 12  Glucose-164, sodium-132, potassium 3.2, BUN/creatinine 19/1.4, bicarb 22  LFTs-WNL, albumin 4.5,    Assessment/Plan   Active Hospital Problems    Diagnosis POA   • COVID-19 virus infection [U07.1] Yes   • YT (acute kidney injury) (CMS/HCC) [N17.9] Unknown   • HTN (hypertension) [I10] Unknown   • GERD without esophagitis [K21.9] Unknown       Assessment & Plan   Exertional dyspnea/cough/fever- none COVID-19 test  positive  -CT positive for most likely COVID-19 pneumonia.  - Continue oxygen supplementation with high flow nasal cannula since patient cannot sleep without BiPAP.  -Decadron started, patient did qualify for Remdesvir after discussion with the pharmacy-but patient wants to discuss with wife, did explain the importance of starting the medication early.  -ID consult.    Diarrhea-again likely secondary to viral illness-continue to monitor, denies any antibiotic use, IV fluids.    • Hyperlipidemia-not on any medication     • Hypertension- benazepril 20 mg p.o. daily- did hold secondary to TY    Gout-allopurinol as needed    GERD-Zantac 150 milligrams as needed    Obstructive sleep apnea-on CPAP.       DVT prophylaxis:    -TEDs/SCDs  -Lovenox    CODE STATUS:    Code Status and Medical Interventions:   Ordered at: 09/13/20 0314     Level Of Support Discussed With:    Patient     Code Status:    CPR     Medical Interventions (Level of Support Prior to Arrest):    Full         Admission Status:  I believe this patient meets inpatient criteria, due to need of treatment for his current pneumonia.    Electronically signed by Bharathi Recinos MD, 09/13/20, 2:36 AM EDT.

## 2020-09-13 NOTE — PROGRESS NOTES
[facility] Medicine Services  ADMISSION FOLLOW-UP NOTE          Patient admitted after midnight, H&P by my partner performed earlier on today's date reviewed.  Interim findings, labs, and charting also reviewed.        The [facility] Hospital Problem List has been managed and updated to include any new diagnoses:  Active Hospital Problems    Diagnosis  POA   • COVID-19 virus infection [U07.1]  Yes   • TY (acute kidney injury) (CMS/HCC) [N17.9]  Unknown   • HTN (hypertension) [I10]  Unknown   • GERD without esophagitis [K21.9]  Unknown      Resolved Hospital Problems   No resolved problems to display.       44-year-old male with history of hypertension, GERD, JOANA on BiPAP, presents with dyspnea on exertion, dry cough, fever admitted with COVID-19 pneumonia this morning he is to that he is doing okay, denies significant SOA is on room air.    COVID-19 pneumonia  -Repeat respiratory panel PCR with COVID-19 is positive here  -CTA chest shows classic peripheral, bilateral GGO consistent with bilateral multifocal pneumonia from, COVID-19  -Patient has mild elevated procalcitonin, low WBC, he is currently on room air, as such suspicion for bacterial pneumonia is low holding off antibiotics for now  -ID consulted, rec convalescent plasma and Remdesivir, however, patient is still debating on this, continue Decadron, Lovenox twice daily.    Renal insufficiency- baseline creatinine unknown currently 1.39, continue IV fluids    JOANA-continue home CPAP.    Hypertension-BP currently well controlled ACEi secondary to TY.    Otherwise continue plan of care per admission H&P    Electronically signed by Ara Ferguson MD, 09/13/20, 11:13 AM EDT.

## 2020-09-13 NOTE — PLAN OF CARE
Goal Outcome Evaluation:  Plan of Care Reviewed With: patient  Progress: improving  Outcome Summary: VSS. Patient arrived to unit around 0445. Patient is AxO x 4. No complaints of pain. Patient seems anxious about having covid, and scared something is going to happen to him. Patient stated he felt more comfortbale in the hospital then at home. Patient is on RA sating 95%. NSR. Heart rate in the 70s. Patient only complaint was loose stools whe he drinks any amount of liquid. Will continue to  monitor.

## 2020-09-14 PROBLEM — A41.9 SEPSIS (HCC): Status: ACTIVE | Noted: 2020-09-14

## 2020-09-14 LAB
ALBUMIN SERPL-MCNC: 4 G/DL (ref 3.5–5.2)
ALBUMIN/GLOB SERPL: 1.1 G/DL
ALP SERPL-CCNC: 59 U/L (ref 39–117)
ALT SERPL W P-5'-P-CCNC: 37 U/L (ref 1–41)
ANION GAP SERPL CALCULATED.3IONS-SCNC: 15 MMOL/L (ref 5–15)
AST SERPL-CCNC: 42 U/L (ref 1–40)
BASOPHILS # BLD AUTO: 0.01 10*3/MM3 (ref 0–0.2)
BASOPHILS NFR BLD AUTO: 0.1 % (ref 0–1.5)
BILIRUB SERPL-MCNC: 0.7 MG/DL (ref 0–1.2)
BUN SERPL-MCNC: 19 MG/DL (ref 6–20)
BUN/CREAT SERPL: 16 (ref 7–25)
CALCIUM SPEC-SCNC: 9.1 MG/DL (ref 8.6–10.5)
CHLORIDE SERPL-SCNC: 97 MMOL/L (ref 98–107)
CO2 SERPL-SCNC: 19 MMOL/L (ref 22–29)
CREAT SERPL-MCNC: 1.19 MG/DL (ref 0.76–1.27)
CREAT UR-MCNC: 25 MG/DL
CRP SERPL-MCNC: 14.77 MG/DL (ref 0–0.5)
D DIMER PPP FEU-MCNC: 0.88 MCGFEU/ML (ref 0–0.56)
D-LACTATE SERPL-SCNC: 1.4 MMOL/L (ref 0.5–2)
DEPRECATED RDW RBC AUTO: 42.1 FL (ref 37–54)
EOSINOPHIL # BLD AUTO: 0 10*3/MM3 (ref 0–0.4)
EOSINOPHIL NFR BLD AUTO: 0 % (ref 0.3–6.2)
ERYTHROCYTE [DISTWIDTH] IN BLOOD BY AUTOMATED COUNT: 13.6 % (ref 12.3–15.4)
ERYTHROCYTE [SEDIMENTATION RATE] IN BLOOD: 71 MM/HR (ref 0–15)
FERRITIN SERPL-MCNC: 1006 NG/ML (ref 30–400)
GFR SERPL CREATININE-BSD FRML MDRD: 66 ML/MIN/1.73
GLOBULIN UR ELPH-MCNC: 3.5 GM/DL
GLUCOSE SERPL-MCNC: 163 MG/DL (ref 65–99)
HCT VFR BLD AUTO: 40.1 % (ref 37.5–51)
HGB BLD-MCNC: 13.5 G/DL (ref 13–17.7)
IMM GRANULOCYTES # BLD AUTO: 0.08 10*3/MM3 (ref 0–0.05)
IMM GRANULOCYTES NFR BLD AUTO: 0.6 % (ref 0–0.5)
LDH SERPL-CCNC: 326 U/L (ref 135–225)
LDH SERPL-CCNC: 344 U/L (ref 135–225)
LYMPHOCYTES # BLD AUTO: 1.24 10*3/MM3 (ref 0.7–3.1)
LYMPHOCYTES NFR BLD AUTO: 9.4 % (ref 19.6–45.3)
MCH RBC QN AUTO: 28.4 PG (ref 26.6–33)
MCHC RBC AUTO-ENTMCNC: 33.7 G/DL (ref 31.5–35.7)
MCV RBC AUTO: 84.2 FL (ref 79–97)
MONOCYTES # BLD AUTO: 0.5 10*3/MM3 (ref 0.1–0.9)
MONOCYTES NFR BLD AUTO: 3.8 % (ref 5–12)
NEUTROPHILS NFR BLD AUTO: 11.35 10*3/MM3 (ref 1.7–7)
NEUTROPHILS NFR BLD AUTO: 86.1 % (ref 42.7–76)
NRBC BLD AUTO-RTO: 0 /100 WBC (ref 0–0.2)
OSMOLALITY UR: 135 MOSM/KG (ref 300–1100)
PLATELET # BLD AUTO: 218 10*3/MM3 (ref 140–450)
PMV BLD AUTO: 12.1 FL (ref 6–12)
POTASSIUM SERPL-SCNC: 3.9 MMOL/L (ref 3.5–5.2)
PROCALCITONIN SERPL-MCNC: 0.64 NG/ML (ref 0–0.25)
PROT SERPL-MCNC: 7.5 G/DL (ref 6–8.5)
RBC # BLD AUTO: 4.76 10*6/MM3 (ref 4.14–5.8)
SODIUM SERPL-SCNC: 131 MMOL/L (ref 136–145)
SODIUM UR-SCNC: <20 MMOL/L
WBC # BLD AUTO: 13.18 10*3/MM3 (ref 3.4–10.8)

## 2020-09-14 PROCEDURE — 82550 ASSAY OF CK (CPK): CPT | Performed by: INTERNAL MEDICINE

## 2020-09-14 PROCEDURE — 83605 ASSAY OF LACTIC ACID: CPT | Performed by: NURSE PRACTITIONER

## 2020-09-14 PROCEDURE — 25010000002 ENOXAPARIN PER 10 MG: Performed by: INTERNAL MEDICINE

## 2020-09-14 PROCEDURE — 94660 CPAP INITIATION&MGMT: CPT

## 2020-09-14 PROCEDURE — 82570 ASSAY OF URINE CREATININE: CPT | Performed by: INTERNAL MEDICINE

## 2020-09-14 PROCEDURE — 84145 PROCALCITONIN (PCT): CPT | Performed by: NURSE PRACTITIONER

## 2020-09-14 PROCEDURE — 99233 SBSQ HOSP IP/OBS HIGH 50: CPT | Performed by: INTERNAL MEDICINE

## 2020-09-14 PROCEDURE — 86140 C-REACTIVE PROTEIN: CPT | Performed by: NURSE PRACTITIONER

## 2020-09-14 PROCEDURE — 84300 ASSAY OF URINE SODIUM: CPT | Performed by: INTERNAL MEDICINE

## 2020-09-14 PROCEDURE — 83615 LACTATE (LD) (LDH) ENZYME: CPT | Performed by: INTERNAL MEDICINE

## 2020-09-14 PROCEDURE — 85025 COMPLETE CBC W/AUTO DIFF WBC: CPT | Performed by: NURSE PRACTITIONER

## 2020-09-14 PROCEDURE — 80053 COMPREHEN METABOLIC PANEL: CPT | Performed by: NURSE PRACTITIONER

## 2020-09-14 PROCEDURE — 82728 ASSAY OF FERRITIN: CPT | Performed by: NURSE PRACTITIONER

## 2020-09-14 PROCEDURE — 63710000001 DEXAMETHASONE PER 0.25 MG: Performed by: INTERNAL MEDICINE

## 2020-09-14 PROCEDURE — 94799 UNLISTED PULMONARY SVC/PX: CPT

## 2020-09-14 PROCEDURE — 85652 RBC SED RATE AUTOMATED: CPT | Performed by: NURSE PRACTITIONER

## 2020-09-14 PROCEDURE — 85379 FIBRIN DEGRADATION QUANT: CPT | Performed by: INTERNAL MEDICINE

## 2020-09-14 PROCEDURE — 83935 ASSAY OF URINE OSMOLALITY: CPT | Performed by: INTERNAL MEDICINE

## 2020-09-14 RX ORDER — QUETIAPINE FUMARATE 25 MG/1
25 TABLET, FILM COATED ORAL NIGHTLY
Status: DISCONTINUED | OUTPATIENT
Start: 2020-09-14 | End: 2020-09-16

## 2020-09-14 RX ORDER — GUAIFENESIN AND DEXTROMETHORPHAN HYDROBROMIDE 600; 30 MG/1; MG/1
1 TABLET, EXTENDED RELEASE ORAL 2 TIMES DAILY PRN
Status: DISCONTINUED | OUTPATIENT
Start: 2020-09-14 | End: 2020-09-18 | Stop reason: HOSPADM

## 2020-09-14 RX ORDER — SODIUM CHLORIDE 9 MG/ML
75 INJECTION, SOLUTION INTRAVENOUS CONTINUOUS
Status: DISCONTINUED | OUTPATIENT
Start: 2020-09-14 | End: 2020-09-14

## 2020-09-14 RX ADMIN — DEXAMETHASONE 6 MG: 4 TABLET ORAL at 08:06

## 2020-09-14 RX ADMIN — FAMOTIDINE 20 MG: 20 TABLET, FILM COATED ORAL at 04:30

## 2020-09-14 RX ADMIN — ACETAMINOPHEN 650 MG: 325 TABLET, FILM COATED ORAL at 17:09

## 2020-09-14 RX ADMIN — OXYCODONE HYDROCHLORIDE AND ACETAMINOPHEN 1000 MG: 500 TABLET ORAL at 14:10

## 2020-09-14 RX ADMIN — SODIUM CHLORIDE, PRESERVATIVE FREE 10 ML: 5 INJECTION INTRAVENOUS at 08:22

## 2020-09-14 RX ADMIN — BENZONATATE 200 MG: 100 CAPSULE ORAL at 21:29

## 2020-09-14 RX ADMIN — OXYCODONE HYDROCHLORIDE AND ACETAMINOPHEN 1000 MG: 500 TABLET ORAL at 08:06

## 2020-09-14 RX ADMIN — BENZONATATE 200 MG: 100 CAPSULE ORAL at 08:06

## 2020-09-14 RX ADMIN — SODIUM CHLORIDE, PRESERVATIVE FREE 10 ML: 5 INJECTION INTRAVENOUS at 21:29

## 2020-09-14 RX ADMIN — FAMOTIDINE 20 MG: 20 TABLET, FILM COATED ORAL at 17:50

## 2020-09-14 RX ADMIN — QUETIAPINE FUMARATE 25 MG: 25 TABLET ORAL at 21:29

## 2020-09-14 RX ADMIN — SODIUM CHLORIDE 75 ML/HR: 9 INJECTION, SOLUTION INTRAVENOUS at 08:21

## 2020-09-14 RX ADMIN — ACETAMINOPHEN 650 MG: 325 TABLET, FILM COATED ORAL at 08:06

## 2020-09-14 RX ADMIN — OXYCODONE HYDROCHLORIDE AND ACETAMINOPHEN 1000 MG: 500 TABLET ORAL at 17:50

## 2020-09-14 RX ADMIN — OXYCODONE HYDROCHLORIDE AND ACETAMINOPHEN 1000 MG: 500 TABLET ORAL at 21:29

## 2020-09-14 RX ADMIN — ACETAMINOPHEN 650 MG: 325 TABLET, FILM COATED ORAL at 04:30

## 2020-09-14 RX ADMIN — ENOXAPARIN SODIUM 40 MG: 40 INJECTION SUBCUTANEOUS at 08:07

## 2020-09-14 RX ADMIN — REMDESIVIR 200 MG: 100 INJECTION, POWDER, LYOPHILIZED, FOR SOLUTION INTRAVENOUS at 16:45

## 2020-09-14 NOTE — PLAN OF CARE
"Goal Outcome Evaluation:  Plan of Care Reviewed With: patient    Upon arrival on shift patient was very irritable and anxious called saying he needs, \"someone in his room now to fix his cpap so he can go to sleep .\"  I called respiratory to adjust his cpap for the settings tonight; they said they had just been in his room and had set him up but he wanted to make a call before he put on his cpap.  Patient verbalized aggravation about the reason he came to the hospital was so \"we could figure out why he wasn't sleeping.\"  Called Karlee ESTES, she ordered melatonin. Patient has been agitated about hearing all the alarms during the night and I gave him earplugs. He then verbalized that he could hear the bipap and needed it to be moved, I moved the cpap machine so he would not hear it.  He verbalized concerns about his care here.  I reviewed what the plan of care was and listened to his concerns. He asked, \"at what point do I go to the ICU?\"  I educated that patients going to the ICU have unstable vitals, are requiring new oxygen requirements and and require a high level of care.  Patient had a 100.2 fever, was given tylenol, recheck was 102, called Karlee ESTES, ordered additional 325mg tylenol. Turned up air conditioning, was given cool rags, ice water, temperature now is 100.2.  Updated provider of this recheck. Temp recheck this morning was 101.8, tylenol given.  Patient states he wants help planning an advance care directive. Consult put in.       called at 0600, asking if patient was unstable and needing her services because patient repeatedly calling  asking to write up advance directive.  Informed her of the patient's status and that I had put in a consult earlier but was unaware of repeated calls to .  Will notify patient that  is aware of need of services.    "

## 2020-09-14 NOTE — PROGRESS NOTES
"    [facility] Medicine Services  PROGRESS NOTE    Patient Name: Jono Mcintyre  : 1975  MRN: [id number]    Date of Admission: 2020  Primary Care Physician: Freddie Zarco MD    Subjective   Subjective     CC: Follow-up COVID-19 pneumonia    HPI:Patient states that he is not doing too well, was febrile last night, he states that he has not slept since Wednesday. Patient is complaining that he has received poor care since he has been here. \" he has been all alone, nurses not answering his calls.........    Review of Systems  Gen- +fevers, chills  CV- No chest pain, palpitations  Resp- No cough, dyspnea  GI- No N/V/D, abd pain     All other systems reviewed and currently negative    Objective   Objective     Vital Signs:   Temp:  [98.6 °F (37 °C)-102 °F (38.9 °C)] 99.9 °F (37.7 °C)  Heart Rate:  [] 82  Resp:  [18-20] 18  BP: (130-165)/(74-94) 139/75        Physical Exam:  With patient's consent, physical exam was conducted via visual telemedicine encounter with bedside portion accommodated by nursing staff due to patient's current isolation requirements in the interest of PPE conservation.    Constitutional: No acute distress, awake, alert  HENT: NCAT, mucous membranes moist  Respiratory: Moderately labored respirations, on room air  Cardiovascular: RRR, no murmurs, rubs, or gallops, palpable pedal pulses bilaterally  Gastrointestinal: Positive bowel sounds, soft, nontender, nondistended  Musculoskeletal: No bilateral ankle edema  Psychiatric: flat affect, cooperative  Neurologic: no foc moderately labored respirations, deficits  Skin: No rashes    Results Reviewed:  Results from last 7 days   Lab Units 20  0505 20  0525 20  0002   WBC 10*3/mm3 13.18* 8.66 8.44   HEMOGLOBIN g/dL 13.5 13.1 14.1   HEMATOCRIT % 40.1 39.2 43.4   PLATELETS 10*3/mm3 218 156 165   PROCALCITONIN ng/mL 0.64*  --  1.11*     Results from last 7 days   Lab Units 20  0505 " 09/13/20  0525 09/13/20  0002   SODIUM mmol/L 131* 131* 132*   POTASSIUM mmol/L 3.9 3.8 4.0   CHLORIDE mmol/L 97* 98 93*   CO2 mmol/L 19.0* 20.0* 22.0   BUN mg/dL 19 19 19   CREATININE mg/dL 1.19 1.36* 1.39*   GLUCOSE mg/dL 163* 159* 164*   CALCIUM mg/dL 9.1 9.2 9.5   ALT (SGPT) U/L 37 36 39   AST (SGOT) U/L 42* 48* 47*     Estimated Creatinine Clearance: 97.8 mL/min (by C-G formula based on SCr of 1.19 mg/dL).    Microbiology Results Abnormal     Procedure Component Value - Date/Time    Gastrointestinal Panel, PCR - Stool, Per Rectum [[id number]]  (Normal) Collected: 09/13/20 0842    Lab Status: Final result Specimen: Stool from Per Rectum Updated: 09/13/20 1116     Campylobacter Not Detected     Plesiomonas shigelloides Not Detected     Salmonella Not Detected     Vibrio Not Detected     Vibrio cholerae Not Detected     Yersinia enterocolitica Not Detected     Enteroaggregative E. coli (EAEC) Not Detected     Enteropathogenic E. coli (EPEC) Not Detected     Enterotoxigenic E. coli (ETEC) lt/st Not Detected     Shiga-like toxin-producing E. coli (STEC) stx1/stx2 Not Detected     E. coli O157 Not Detected     Shigella/Enteroinvasive E. coli (EIEC) Not Detected     Cryptosporidium Not Detected     Cyclospora cayetanensis Not Detected     Entamoeba histolytica Not Detected     Giardia lamblia Not Detected     Adenovirus F40/41 Not Detected     Astrovirus Not Detected     Norovirus GI/GII Not Detected     Rotavirus A Not Detected     Sapovirus (I, II, IV or V) Not Detected    Respiratory Panel PCR w/COVID-19(SARS-CoV-2) SUMAN/ISSA/KRIS/PAD/COR/MAD In-House, NP Swab in UTM/VTM, 3-4 HR TAT - Swab, Nasopharynx [[id number]]  (Abnormal) Collected: 09/13/20 0312    Lab Status: Final result Specimen: Swab from Nasopharynx Updated: 09/13/20 0529     ADENOVIRUS, PCR Not Detected     Coronavirus 229E Not Detected     Coronavirus HKU1 Not Detected     Coronavirus NL63 Not Detected     Coronavirus OC43 Not Detected     COVID19 Detected       Human Metapneumovirus Not Detected     Human Rhinovirus/Enterovirus Not Detected     Influenza A PCR Not Detected     Influenza A H1 Not Detected     Influenza A H1 2009 PCR Not Detected     Influenza A H3 Not Detected     Influenza B PCR Not Detected     Parainfluenza Virus 1 Not Detected     Parainfluenza Virus 2 Not Detected     Parainfluenza Virus 3 Not Detected     Parainfluenza Virus 4 Not Detected     RSV, PCR Not Detected     Bordetella pertussis pcr Not Detected     Bordetella parapertussis PCR Not Detected     Chlamydophila pneumoniae PCR Not Detected     Mycoplasma pneumo by PCR Not Detected    Narrative:      Fact sheet for providers: https://docs.[employer]/wp-content/uploads/KPA0627-9885-RS0.1-EUA-Provider-Fact-Sheet-3.pdf    Fact sheet for patients: https://docs.[employer]/wp-content/uploads/NZC8797-6983-ZZ0.1-EUA-Patient-Fact-Sheet-1.pdf          Imaging Results (Last 24 Hours)     ** No results found for the last 24 hours. **               I have reviewed the medications:  Scheduled Meds:dexamethasone, 6 mg, Oral, Daily With Breakfast  enoxaparin, 40 mg, Subcutaneous, Q24H  famotidine, 20 mg, Oral, BID AC  QUEtiapine, 25 mg, Oral, Nightly  sodium chloride, 10 mL, Intravenous, Q12H  vitamin C, 1,000 mg, Oral, 4x Daily      Continuous Infusions:   PRN Meds:.•  acetaminophen **OR** acetaminophen **OR** acetaminophen  •  benzonatate  •  guaifenesin-dextromethorphan  •  sodium chloride    Assessment/Plan   Assessment & Plan     Active Hospital Problems    Diagnosis  POA   • **Sepsis (CMS/[facility]) [A41.9]  No   • COVID-19 virus infection [U07.1]  Yes   • TY (acute kidney injury) (CMS/[facility]) [N17.9]  Yes   • HTN (hypertension) [I10]  Yes   • GERD without esophagitis [K21.9]  Yes      Resolved Hospital Problems   No resolved problems to display.        Brief Hospital Course to date:  44-year-old male with history of hypertension, GERD, JOANA on BiPAP, presents with dyspnea on exertion, dry cough, fever  admitted with sepsis secondary to COVID-19 pneumonia.     Plan  Sepsis with COVID-19 pneumonia  -Patient currently febrile T-max 102, has leukocytosis (could be from steroids), with source likely of pneumonia  -Repeat respiratory panel PCR with COVID-19 is positive here  -CTA chest shows classic peripheral, bilateral GGO consistent with bilateral multifocal pneumonia from, COVID-19  -ID consulted and following, rec convalescent plasma and Remdesivir, however, patient was still debating on this, continue Decadron, he may also benefit from broad-spectrum antibiotics, but I will defer this to ID. Continue Lovenox twice daily.  -Continue to follow-up COVID progression labs CMP, LDH, ferritin    Hyponatremia-etiology remains unknown, likely hypovolemic hyponatremia, send for urine electrolytes, start IV fluids continue to monitor    Renal insufficiency- baseline Cr unknown currently 1.39, continue IV fluids     JOANA-continue home CPAP.     Hypertension-BP currently well controlled ACEi secondary to TY.    Insomnia-we will attempt Seroquel 25 mg nightly.     DVT Prophylaxis: Lovenox    Addendum: Discussed with Dr. Norton, patient has declined convalescent plasma and Remdesivir.  This time we will also discontinue dexamethasone.  Plan is to watch him overnight, if he is not hypoxic and he feels better, will be will discharge him home in the a.m. otherwise if he becomes more hypoxic, febrile will likely reinitiate the above.    Disposition: TBD    Called spouse Laxmi at [phone], updated and answered her questions.    CODE STATUS:   Code Status and Medical Interventions:   Ordered at: 09/13/20 0314     Level Of Support Discussed With:    Patient     Code Status:    CPR     Medical Interventions (Level of Support Prior to Arrest):    Full       Electronically signed by Ara Ferguson MD, 09/14/20, 11:23 EDT.

## 2020-09-14 NOTE — PLAN OF CARE
Goal Outcome Evaluation:  Plan of Care Reviewed With: patient  Progress: improving  Outcome Summary: pt cooperating vss temp 100.po 96%ra pt started remdezavir

## 2020-09-14 NOTE — CONSULTS
INFECTIOUS DISEASE CONSULT/INITIAL HOSPITAL VISIT    Jono Mcintyre  1975  [id number]    Date of Consult: 9/13/20  Admission Date: 9/12/2020      Requesting Provider: FRANKLYN Cardenas  Evaluating Physician: Dr. Kevyn Norton    Reason for Consultation: COVID-19 pneumonia    Chief complaint: COVID-19 pneumonia and shortness of breath at night.    History of present illness:    Mr. Jono Mcintyre is a 44 y.o. male who is being evaluated for COVID-19 pneumonia.  He has a past medical history significant for hyperlipidemia, hypertension and obstructive sleep apnea on BiPAP at night.  He states that he began to feel short of breath and began running a fever up to 102 that began Tuesday 9/7.  He states that he was tested for COVID-19 at [facility] pharmacy on Thursday 9/10 and he was informed of the positive results on 9/11.  He continued to have shortness of breath, excessive diarrhea and fever and states that he was called by the health department on Saturday 9/12 and it was recommended by the HD that he presented to the ED for evaluation secondary to his symptoms.    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  He states that his wife has felt ill over the past week but is currently feeling better.  He has 2 children who have been asymptomatic with no signs and symptoms of illness.    Since arrival he has had T-max of 100.9 and O2 sats have been 93-95% on room air except he has not been sleeping well and SPO2 may be dropping at night.  Initial labs show he is without leukocytosis but he is neutrophilic with an ANC of 7.07.  D-dimer, LDH, CRP, lactate, ferritin and pro calcitonin are all elevated.  Creatinine is currently elevated at 1.36.  Repeat COVID-19 testing on 9/13 has returned positive.  CT scan showed bilateral multifocal pneumonia.  GI panel PCR is in progress.    He has no known antibiotic allergies and is currently receiving Decadron therapy.      Subjective: febrile  overnight up to 102 F. No shortness of breath or cough. No chest pain. Ambulating O2 sats about 95-96% today. Patient not interested in getting Remdesivir or plasma. Would like to stay in the hospital longer as he doesn't think that he can be a dad right now as he doesn't feel well. His wife is also sick at home.     Past Medical History:   Diagnosis Date   • Hyperlipidemia    • Hypertension    • Sleep apnea        History reviewed. No pertinent surgical history.    Family History   Problem Relation Age of Onset   • Hypertension Father    • Hypertension Other    • Heart disease Other    • Stroke Other    • Diabetes Other    • Alzheimer's disease Other        Social History     Socioeconomic History   • Marital status:      Spouse name: Not on file   • Number of children: Not on file   • Years of education: Not on file   • Highest education level: Not on file   Tobacco Use   • Smoking status: Never Smoker   • Smokeless tobacco: Never Used   Substance and Sexual Activity   • Alcohol use: Yes   • Drug use: Never   • Sexual activity: Yes     Partners: Female     Birth control/protection: None     Comment: spouse       Allergies   Allergen Reactions   • No Known Drug Allergy          Medication:    Current Facility-Administered Medications:   •  acetaminophen (TYLENOL) tablet 650 mg, 650 mg, Oral, Q4H PRN, 650 mg at 09/14/20 0806 **OR** acetaminophen (TYLENOL) 160 MG/5ML solution 650 mg, 650 mg, Oral, Q4H PRN **OR** acetaminophen (TYLENOL) suppository 650 mg, 650 mg, Rectal, Q4H PRN, Tristan, Karlee, APRN  •  benzonatate (TESSALON) capsule 200 mg, 200 mg, Oral, TID PRN, Tristan, Karlee, APRN, 200 mg at 09/14/20 0806  •  enoxaparin (LOVENOX) syringe 40 mg, 40 mg, Subcutaneous, Q24H, Ara Ferguson MD, 40 mg at 09/14/20 0807  •  famotidine (PEPCID) tablet 20 mg, 20 mg, Oral, BID AC, Lele Garrison, APRN, 20 mg at 09/14/20 0430  •  guaifenesin-dextromethorphan (MUCINEX DM) tablet 1 tablet, 1 tablet, Oral,  BID PRN, Ara Ferguson MD  •  QUEtiapine (SEROquel) tablet 25 mg, 25 mg, Oral, Nightly, Ara Ferguson MD  •  sodium chloride 0.9 % flush 10 mL, 10 mL, Intravenous, Q12H, Tristan, Karlee, APRN, 10 mL at 20 0822  •  sodium chloride 0.9 % flush 10 mL, 10 mL, Intravenous, PRN, Tristan, Karlee, APRN  •  vitamin C (ASCORBIC ACID) tablet 1,000 mg, 1,000 mg, Oral, 4x Daily, Lele Garrison, APRN, 1,000 mg at 20 0806    Antibiotics:  Anti-Infectives (From admission, onward)    None            Review of Systems:  Constitutional-- + Fever, chills or sweats. + fatigue.  HEENT-- No new vision, hearing or throat complaints.  No epistaxis or oral sores.  Denies odynophagia or dysphagia. No headache, photophobia or neck stiffness.  CV-- No chest pain, palpitation or syncope  Resp-- + SOB at night. No productive cough or Hemoptysis  GI- No nausea, vomiting, or diarrhea.  No hematochezia, melena, or hematemesis. Denies jaundice or chronic liver disease.  -- No dysuria, hematuria, or flank pain.  Denies hesitancy, urgency or flank pain.  Lymph- no swollen lymph nodes in neck/axilla or groin.   Heme- No active bruising or bleeding; no Hx of DVT or PE.  MS-- no swelling or pain in the bones or joints of arms/legs.  No new back pain.  Neuro-- No acute focal weakness or numbness in the arms or legs.  No seizures.  Skin--No rashes or lesions      Physical Exam:   Vital Signs  Temp (24hrs), Av.7 °F (38.2 °C), Min:99.8 °F (37.7 °C), Max:102 °F (38.9 °C)    Temp  Min: 99.8 °F (37.7 °C)  Max: 102 °F (38.9 °C)  BP  Min: 130/83  Max: 165/74  Pulse  Min: 76  Max: 113  Resp  Min: 18  Max: 20  SpO2  Min: 95 %  Max: 98 %    Due to the current COVID-19 Pandemic and critical shortage of PPE, the patient was interviewed over the phone and exam was conducted through direct visualization of the patient through his window.    GENERAL: Awake and alert, in no acute distress.   HEENT: Normocephalic, atraumatic.  No external  oral lesions  Eyes:  No conjunctival injection. No icterus.  NECK: Supple without nuchal rigidity. No mass.  HEART: RRR per heart monitor   LUNGS: non-labored breathing on room air. No use of accessory muscles.  ABDOMEN: Soft, nontender- patient assisted exam  EXT:  No cyanosis, clubbing or edema. No cord.  :   Without Armando catheter.  MSK: FROM without any joint effusions noted  SKIN: no rashes noted over exposed skin. No jaundice  NEURO: Oriented to PPT. Normal speech and cognition  PSYCHIATRIC: Normal insight and judgement. Cooperative with PE    Laboratory Data    Results from last 7 days   Lab Units 09/14/20  0505 09/13/20  0525 09/13/20  0002   WBC 10*3/mm3 13.18* 8.66 8.44   HEMOGLOBIN g/dL 13.5 13.1 14.1   HEMATOCRIT % 40.1 39.2 43.4   PLATELETS 10*3/mm3 218 156 165     Results from last 7 days   Lab Units 09/14/20  0505   SODIUM mmol/L 131*   POTASSIUM mmol/L 3.9   CHLORIDE mmol/L 97*   CO2 mmol/L 19.0*   BUN mg/dL 19   CREATININE mg/dL 1.19   GLUCOSE mg/dL 163*   CALCIUM mg/dL 9.1     Results from last 7 days   Lab Units 09/14/20  0505   ALK PHOS U/L 59   BILIRUBIN mg/dL 0.7   ALT (SGPT) U/L 37   AST (SGOT) U/L 42*     Results from last 7 days   Lab Units 09/14/20  0505   SED RATE mm/hr 71*     Results from last 7 days   Lab Units 09/14/20  0505   CRP mg/dL 14.77*     Results from last 7 days   Lab Units 09/14/20  0505   LACTATE mmol/L 1.4             Estimated Creatinine Clearance: 97.8 mL/min (by C-G formula based on SCr of 1.19 mg/dL).      Microbiology:  9/13-respiratory panel PCR positive COVID-19      Radiology:  Imaging Results (Last 72 Hours)     Procedure Component Value Units Date/Time    CT Chest Pulmonary Embolism [[id number]] Collected: 09/13/20 0216     Updated: 09/13/20 0218    Narrative:      CT CHEST PULMONARY EMBOLISM W CONTRAST    INDICATION:   Pneumonia with fever    TECHNIQUE:   CT angiogram of the chest with 100 cc of Isovue-300 IV contrast. 3-D reconstructions were obtained and  reviewed.   Radiation dose reduction techniques included automated exposure control or exposure modulation based on body size. Count of known CT and  cardiac nuc med studies performed in previous 12 months: 0.     COMPARISON:   None available.    FINDINGS:   Chest images at mediastinal window show no adenopathy or effusions. There are no pulmonary artery filling defects to suggest emboli. Hepatic steatosis is noted. The CT angiographic images also show no evidence of emboli.    Chest images at lung window show multifocal groundglass infiltrates more prominent in the mid to lower lung fields and peripherally. Commonly reported imaging features of COVID-19 pneumonia are present. Other processes such as influenza pneumonia and  organizing pneumonia can cause a similar imaging pattern.      Impression:      Bilateral multifocal pneumonia. No evidence of pulmonary embolism. No evidence of pleural or pericardial fluid. Hepatic steatosis is also seen.    Signer Name: Pablo Guerra MD   Signed: 2020 2:16 AM   Workstation Name: [id number]    [facility]          Impression:   1. Sepsis presenting with fever, elevated inflammation markers and bilateral multifocal pneumonia  2. COVID-19 pneumonia- some concern given fever, mild hypoxia, and significant elevated CRP. At risk for further decline  3. Acute hypoxic respiratory failure- improved today. Stable on room air  4. Acute kidney injury secondary to dehydration from COVID-19 infection  5. Dehydration secondary to diarrhea  6. Diarrhea  7. Hypertension  8. GERD  9. Obstructive sleep apnea-uses CPAP at night    PLAN/RECOMMENDATIONS:   Thank you for asking us to see Jono Mcintyre, I recommend the followin. Continue to follow cultures   2. Obtain a.m. labs include CBC, CMP, CRP, and ferritin. Will need to closely monitor renal and hepatic function while on Remdesivir  3. Baseline IL-6 level- sent  4. Stop decadron. Probably some risk of  using Decadron in the early phases of the illness without an antiviral agent. We have seen some people decline when this has been done. Additionally, the trial that showed mortality benefit with decadron only showed benefit in the setting of hypoxia and he is currently on room air with SPO2 in mid-upper 90s.   5. Hold off on Remdesivir and convalesent plasma. He initially agreed to these but is now declining. Additionally SPO2 is stable on room air currently although I do think that he is at risk for further decline which I have explained to him. I have explained that these agents are likely to be more effective if given early and that it would be reasonable to give him Remdesivir while he is inpatient if he elected for it.  I have explained that it is his choice to decline therapy and he still declines therapy.   6. Continue to hold antibiotics for now as I doubt bacterial pneumonia despite elevated procalcitonin given no productive cough and appearance on CT chest but will continue to monitor closely and low threshold to start if he declines.  7. Continue supportive care with O2 therapy prn. Currently off O2  8. Continue IVFs per hospitalist service given his TY in the setting of diarrhea (diarrhea likely secondary to COVID-19 infection)  9. Continue anticoagulation therapy with Lovenox as patient is at risk for DVT/PE with COVID-19 infection    I discussed with Dr. Ferguson today    I am ok with discharge in 24 hrs if he continues to do well and no decline in SPO2. He should get a pulse oximeter and/or Current [employer] monitor at the time of discharge.       Addendum:    Patient now would like to try Remdesivir. Will start this medication    I discussed with nursing    Kevyn Norton MD  9/14/2020  12:58 EDT

## 2020-09-15 LAB
ALBUMIN SERPL-MCNC: 4 G/DL (ref 3.5–5.2)
ALP SERPL-CCNC: 57 U/L (ref 39–117)
ALT SERPL W P-5'-P-CCNC: 41 U/L (ref 1–41)
AST SERPL-CCNC: 51 U/L (ref 1–40)
BILIRUB CONJ SERPL-MCNC: 0.2 MG/DL (ref 0–0.3)
BILIRUB INDIRECT SERPL-MCNC: 0.4 MG/DL
BILIRUB SERPL-MCNC: 0.6 MG/DL (ref 0–1.2)
CK SERPL-CCNC: 475 U/L (ref 20–200)
D DIMER PPP FEU-MCNC: 0.77 MCGFEU/ML (ref 0–0.56)
FERRITIN SERPL-MCNC: 1160 NG/ML (ref 30–400)
FIBRINOGEN PPP-MCNC: 740 MG/DL (ref 215–430)
PROT SERPL-MCNC: 7.5 G/DL (ref 6–8.5)

## 2020-09-15 PROCEDURE — 25010000002 ENOXAPARIN PER 10 MG: Performed by: INTERNAL MEDICINE

## 2020-09-15 PROCEDURE — 94799 UNLISTED PULMONARY SVC/PX: CPT

## 2020-09-15 PROCEDURE — 99232 SBSQ HOSP IP/OBS MODERATE 35: CPT | Performed by: INTERNAL MEDICINE

## 2020-09-15 PROCEDURE — 85379 FIBRIN DEGRADATION QUANT: CPT | Performed by: INTERNAL MEDICINE

## 2020-09-15 PROCEDURE — 82728 ASSAY OF FERRITIN: CPT | Performed by: INTERNAL MEDICINE

## 2020-09-15 PROCEDURE — 85384 FIBRINOGEN ACTIVITY: CPT | Performed by: INTERNAL MEDICINE

## 2020-09-15 PROCEDURE — 63710000001 DEXAMETHASONE PER 0.25 MG: Performed by: INTERNAL MEDICINE

## 2020-09-15 PROCEDURE — 80076 HEPATIC FUNCTION PANEL: CPT

## 2020-09-15 RX ORDER — SODIUM CHLORIDE 9 MG/ML
50 INJECTION, SOLUTION INTRAVENOUS CONTINUOUS
Status: DISCONTINUED | OUTPATIENT
Start: 2020-09-15 | End: 2020-09-16

## 2020-09-15 RX ORDER — DEXAMETHASONE 4 MG/1
6 TABLET ORAL
Status: DISCONTINUED | OUTPATIENT
Start: 2020-09-15 | End: 2020-09-18 | Stop reason: HOSPADM

## 2020-09-15 RX ADMIN — SODIUM CHLORIDE 50 ML/HR: 9 INJECTION, SOLUTION INTRAVENOUS at 15:09

## 2020-09-15 RX ADMIN — ACETAMINOPHEN 650 MG: 325 TABLET, FILM COATED ORAL at 12:07

## 2020-09-15 RX ADMIN — GUAIFENESIN AND DEXTROMETHORPHAN HYDROBROMIDE 1 TABLET: 600; 30 TABLET, EXTENDED RELEASE ORAL at 01:43

## 2020-09-15 RX ADMIN — QUETIAPINE FUMARATE 25 MG: 25 TABLET ORAL at 20:53

## 2020-09-15 RX ADMIN — FAMOTIDINE 20 MG: 20 TABLET, FILM COATED ORAL at 05:13

## 2020-09-15 RX ADMIN — SODIUM CHLORIDE, PRESERVATIVE FREE 10 ML: 5 INJECTION INTRAVENOUS at 09:45

## 2020-09-15 RX ADMIN — BENZONATATE 200 MG: 100 CAPSULE ORAL at 20:53

## 2020-09-15 RX ADMIN — SODIUM CHLORIDE, PRESERVATIVE FREE 10 ML: 5 INJECTION INTRAVENOUS at 20:54

## 2020-09-15 RX ADMIN — ACETAMINOPHEN 650 MG: 325 TABLET, FILM COATED ORAL at 05:19

## 2020-09-15 RX ADMIN — OXYCODONE HYDROCHLORIDE AND ACETAMINOPHEN 1000 MG: 500 TABLET ORAL at 15:08

## 2020-09-15 RX ADMIN — OXYCODONE HYDROCHLORIDE AND ACETAMINOPHEN 1000 MG: 500 TABLET ORAL at 17:01

## 2020-09-15 RX ADMIN — FAMOTIDINE 20 MG: 20 TABLET, FILM COATED ORAL at 17:01

## 2020-09-15 RX ADMIN — ENOXAPARIN SODIUM 40 MG: 40 INJECTION SUBCUTANEOUS at 09:41

## 2020-09-15 RX ADMIN — DEXAMETHASONE 6 MG: 4 TABLET ORAL at 09:41

## 2020-09-15 RX ADMIN — REMDESIVIR 100 MG: 100 INJECTION, POWDER, LYOPHILIZED, FOR SOLUTION INTRAVENOUS at 17:01

## 2020-09-15 RX ADMIN — BENZOCAINE AND MENTHOL 1 LOZENGE: 15; 3.6 LOZENGE ORAL at 01:44

## 2020-09-15 RX ADMIN — OXYCODONE HYDROCHLORIDE AND ACETAMINOPHEN 1000 MG: 500 TABLET ORAL at 20:53

## 2020-09-15 RX ADMIN — BENZOCAINE AND MENTHOL 1 LOZENGE: 15; 3.6 LOZENGE ORAL at 05:13

## 2020-09-15 RX ADMIN — OXYCODONE HYDROCHLORIDE AND ACETAMINOPHEN 1000 MG: 500 TABLET ORAL at 09:42

## 2020-09-15 RX ADMIN — BENZONATATE 200 MG: 100 CAPSULE ORAL at 09:42

## 2020-09-15 NOTE — PROGRESS NOTES
[facility] Medicine Services  PROGRESS NOTE    Patient Name: Jono Mcintyre  : 1975  MRN: [id number]    Date of Admission: 2020  Primary Care Physician: Freddie Zarco MD    Subjective   Subjective     CC: Follow-up COVID-19 pneumonia    HPI: No acute events overnight, patient had a better night did get some rest and actually feels much better this morning, he did have a T-max of 101.8.    Review of Systems  Gen- + fevers, chills  CV- No chest pain, palpitations  Resp- +cough, dyspnea  GI- No N/V/D, abd pain    All other systems reviewed and are negative    Objective   Objective     Vital Signs:   Temp:  [97.6 °F (36.4 °C)-101.8 °F (38.8 °C)] 97.6 °F (36.4 °C)  Heart Rate:  [67-99] 67  Resp:  [17-18] 17  BP: (107-148)/(62-94) 109/94     Physical Exam:  With patient's consent, physical exam was conducted via visual telemedicine encounter with bedside portion accommodated by nursing staff due to patient's current isolation requirements in the interest of PPE conservation.    Constitutional: No acute distress, awake, alert  HENT: NCAT, mucous membranes moist  Respiratory: Nonlabored respirations, on room air  Cardiovascular: RRR, on telemetry  Gastrointestinal: nondistended  Musculoskeletal: No bilateral ankle edema  Psychiatric: Appropriate affect, cooperative  Neurologic: Oriented x 3, nonfocal  Skin: No rashes    Results Reviewed:  Results from last 7 days   Lab Units 20  05020  0002   WBC 10*3/mm3 13.18* 8.66 8.44   HEMOGLOBIN g/dL 13.5 13.1 14.1   HEMATOCRIT % 40.1 39.2 43.4   PLATELETS 10*3/mm3 218 156 165   PROCALCITONIN ng/mL 0.64*  --  1.11*     Results from last 7 days   Lab Units 09/15/20  0728 20  0505 20  0520  0002   SODIUM mmol/L  --  131* 131* 132*   POTASSIUM mmol/L  --  3.9 3.8 4.0   CHLORIDE mmol/L  --  97* 98 93*   CO2 mmol/L  --  19.0* 20.0* 22.0   BUN mg/dL  --  19 19 19   CREATININE mg/dL  --  1.19  1.36* 1.39*   GLUCOSE mg/dL  --  163* 159* 164*   CALCIUM mg/dL  --  9.1 9.2 9.5   ALT (SGPT) U/L 41 37 36 39   AST (SGOT) U/L 51* 42* 48* 47*     Estimated Creatinine Clearance: 97.4 mL/min (by C-G formula based on SCr of 1.19 mg/dL).    Microbiology Results Abnormal     Procedure Component Value - Date/Time    Gastrointestinal Panel, PCR - Stool, Per Rectum [[id number]]  (Normal) Collected: 09/13/20 0842    Lab Status: Final result Specimen: Stool from Per Rectum Updated: 09/13/20 1116     Campylobacter Not Detected     Plesiomonas shigelloides Not Detected     Salmonella Not Detected     Vibrio Not Detected     Vibrio cholerae Not Detected     Yersinia enterocolitica Not Detected     Enteroaggregative E. coli (EAEC) Not Detected     Enteropathogenic E. coli (EPEC) Not Detected     Enterotoxigenic E. coli (ETEC) lt/st Not Detected     Shiga-like toxin-producing E. coli (STEC) stx1/stx2 Not Detected     E. coli O157 Not Detected     Shigella/Enteroinvasive E. coli (EIEC) Not Detected     Cryptosporidium Not Detected     Cyclospora cayetanensis Not Detected     Entamoeba histolytica Not Detected     Giardia lamblia Not Detected     Adenovirus F40/41 Not Detected     Astrovirus Not Detected     Norovirus GI/GII Not Detected     Rotavirus A Not Detected     Sapovirus (I, II, IV or V) Not Detected    Respiratory Panel PCR w/COVID-19(SARS-CoV-2) SUMAN/ISSA/KRIS/PAD/COR/MAD In-House, NP Swab in UTM/VTM, 3-4 HR TAT - Swab, Nasopharynx [[id number]]  (Abnormal) Collected: 09/13/20 0312    Lab Status: Final result Specimen: Swab from Nasopharynx Updated: 09/13/20 0529     ADENOVIRUS, PCR Not Detected     Coronavirus 229E Not Detected     Coronavirus HKU1 Not Detected     Coronavirus NL63 Not Detected     Coronavirus OC43 Not Detected     COVID19 Detected     Human Metapneumovirus Not Detected     Human Rhinovirus/Enterovirus Not Detected     Influenza A PCR Not Detected     Influenza A H1 Not Detected     Influenza A H1 2009 PCR  Not Detected     Influenza A H3 Not Detected     Influenza B PCR Not Detected     Parainfluenza Virus 1 Not Detected     Parainfluenza Virus 2 Not Detected     Parainfluenza Virus 3 Not Detected     Parainfluenza Virus 4 Not Detected     RSV, PCR Not Detected     Bordetella pertussis pcr Not Detected     Bordetella parapertussis PCR Not Detected     Chlamydophila pneumoniae PCR Not Detected     Mycoplasma pneumo by PCR Not Detected    Narrative:      Fact sheet for providers: https://docs.[employer]/wp-content/uploads/LJT8604-6956-WX3.1-EUA-Provider-Fact-Sheet-3.pdf    Fact sheet for patients: https://docs.[employer]/wp-content/uploads/ZGP4175-1087-ZT4.1-EUA-Patient-Fact-Sheet-1.pdf          Imaging Results (Last 24 Hours)     ** No results found for the last 24 hours. **        I have reviewed the medications:  Scheduled Meds:dexamethasone, 6 mg, Oral, Daily With Breakfast  enoxaparin, 40 mg, Subcutaneous, Q24H  famotidine, 20 mg, Oral, BID AC  [facility] [id number] remdesivir in NS IVPB, 100 mg, Intravenous, Q24H  QUEtiapine, 25 mg, Oral, Nightly  sodium chloride, 10 mL, Intravenous, Q12H  vitamin C, 1,000 mg, Oral, 4x Daily      Continuous Infusions:Pharmacy Consult - Remdesivir,       PRN Meds:.•  acetaminophen **OR** acetaminophen **OR** acetaminophen  •  benzocaine-menthol  •  benzonatate  •  guaifenesin-dextromethorphan  •  Pharmacy Consult - Remdesivir  •  sodium chloride    Assessment/Plan   Assessment & Plan     Active Hospital Problems    Diagnosis  POA   • **Sepsis (CMS/[facility]) [A41.9]  No   • COVID-19 virus infection [U07.1]  Yes   • TY (acute kidney injury) (CMS/HCC) [N17.9]  Yes   • HTN (hypertension) [I10]  Yes   • GERD without esophagitis [K21.9]  Yes      Resolved Hospital Problems   No resolved problems to display.        Brief Hospital Course to date:  44-year-old male with history of hypertension, GERD, JOANA on BiPAP, presents with dyspnea on exertion, dry cough, fever admitted with sepsis secondary to  COVID-19 pneumonia.     Plan  Sepsis with COVID-19 pneumonia  -Patient currently febrile T-max 102, has leukocytosis (could be from steroids), with source likely of pneumonia  -Repeat respiratory panel PCR with COVID-19 is positive here  -CTA chest shows classic peripheral, bilateral GGO consistent with bilateral multifocal pneumonia from, COVID-19  -ID consulted and following, rec convalescent plasma and Remdesivir, patient is currently agreeable to Remdesivir which was started 9/14, continue Decadron, Lovenox twice daily.  -Continue to follow-up COVID progression labs CMP, LDH, ferritin     Hyponatremia  -etiology remains unknown, likely hypovolemic hyponatremia, Ramin<20.UOsm 135, continue IV fluids continue to monitor     Renal insufficiency- baseline Cr unknown currently peaked to 1.39,  improved, continue IV fluids     JOANA-continue home CPAP.     Hypertension-BP currently low, continue IVF, hold home ACEi .     Insomnia- Seroquel 25 mg nightly.     DVT Prophylaxis: Jess Hair at [phone], updated and answered her questions.     CODE STATUS:   Code Status and Medical Interventions:   Ordered at: 09/13/20 0314     Level Of Support Discussed With:    Patient     Code Status:    CPR     Medical Interventions (Level of Support Prior to Arrest):    Full       Electronically signed by Ara Ferguson MD, 09/15/20, 08:59 EDT.

## 2020-09-15 NOTE — PROGRESS NOTES
Discharge Planning Assessment  [facility]     Patient Name: Jono Mcintyre  MRN: [id number]  Today's Date: 9/15/2020    Admit Date: 9/12/2020    Discharge Needs Assessment     Row Name 09/15/20 1423       Living Environment    Lives With  spouse;child(allan), dependent    Current Living Arrangements  home/apartment/condo    Primary Care Provided by  self    Provides Primary Care For  no one    Family Caregiver if Needed  spouse    Quality of Family Relationships  helpful;involved    Able to Return to Prior Arrangements  yes       Resource/Environmental Concerns    Resource/Environmental Concerns  none       Transition Planning    Patient/Family Anticipates Transition to  home;home with family    Patient/Family Anticipated Services at Transition      Transportation Anticipated  family or friend will provide       Discharge Needs Assessment    Readmission Within the Last 30 Days  no previous admission in last 30 days    Equipment Currently Used at Home  cpap    Concerns to be Addressed  discharge planning        Discharge Plan     Row Name 09/15/20 1423       Plan    Plan  CM Update    Patient/Family in Agreement with Plan  yes    Plan Comments  Per patient over the phone as he is covid19 positive - he lives in [place] with his spouse - He does not use any DME other than a home CPAP and has no identified needs for home health other than possible Current [employer] monitoring. Resdesivir continues for covid19 treatment. CM will follow, no needs at this time - tyler [phone]        Continued Care and Services - Admitted Since 9/12/2020    Coordination has not been started for this encounter.         Demographic Summary     Row Name 09/15/20 1422       General Information    Admission Type  inpatient    Arrived From  home    Referral Source  admission list    Reason for Consult  discharge planning    Preferred Language  English     Used During This Interaction  no       Contact Information     Permission Granted to Share Info With          Functional Status     Row Name 09/15/20 1423       Functional Status    Usual Activity Tolerance  good    Current Activity Tolerance  moderate       Functional Status, IADL    Medications  independent    Meal Preparation  independent    Housekeeping  independent    Laundry  independent    Shopping  independent        Psychosocial    No documentation.       Abuse/Neglect    No documentation.       Legal    No documentation.       Substance Abuse    No documentation.       Patient Forms    No documentation.           Ceci Martinez RN

## 2020-09-15 NOTE — PLAN OF CARE
Pt afebrile this shift, room air, CPAP worn while resting. Pt complained of cough, PRN admin, contacted Miky RODRIGUEZ, later in the shift for a cough lozenge order per pt request. NSR, Tachy when ambulating to the bathroom. Cont to monitor.         0530 temp 101.9, tylenol prn admin

## 2020-09-16 LAB
ALBUMIN SERPL-MCNC: 3.9 G/DL (ref 3.5–5.2)
ALP SERPL-CCNC: 57 U/L (ref 39–117)
ALT SERPL W P-5'-P-CCNC: 40 U/L (ref 1–41)
ANION GAP SERPL CALCULATED.3IONS-SCNC: 19 MMOL/L (ref 5–15)
AST SERPL-CCNC: 29 U/L (ref 1–40)
BILIRUB CONJ SERPL-MCNC: <0.2 MG/DL (ref 0–0.3)
BILIRUB INDIRECT SERPL-MCNC: NORMAL MG/DL
BILIRUB SERPL-MCNC: 0.6 MG/DL (ref 0–1.2)
BUN SERPL-MCNC: 30 MG/DL (ref 6–20)
BUN/CREAT SERPL: 24.6 (ref 7–25)
CALCIUM SPEC-SCNC: 9.6 MG/DL (ref 8.6–10.5)
CHLORIDE SERPL-SCNC: 98 MMOL/L (ref 98–107)
CK SERPL-CCNC: 135 U/L (ref 20–200)
CO2 SERPL-SCNC: 18 MMOL/L (ref 22–29)
CREAT SERPL-MCNC: 1.22 MG/DL (ref 0.76–1.27)
FERRITIN SERPL-MCNC: 1367 NG/ML (ref 30–400)
GFR SERPL CREATININE-BSD FRML MDRD: 64 ML/MIN/1.73
GLUCOSE SERPL-MCNC: 106 MG/DL (ref 65–99)
IL6 SERPL-MCNC: 64.9 PG/ML (ref 0–15.5)
LDH SERPL-CCNC: 311 U/L (ref 135–225)
POTASSIUM SERPL-SCNC: 4.7 MMOL/L (ref 3.5–5.2)
PROT SERPL-MCNC: 7.5 G/DL (ref 6–8.5)
SODIUM SERPL-SCNC: 135 MMOL/L (ref 136–145)

## 2020-09-16 PROCEDURE — 94660 CPAP INITIATION&MGMT: CPT

## 2020-09-16 PROCEDURE — 63710000001 DEXAMETHASONE PER 0.25 MG: Performed by: INTERNAL MEDICINE

## 2020-09-16 PROCEDURE — 94799 UNLISTED PULMONARY SVC/PX: CPT

## 2020-09-16 PROCEDURE — 25010000002 ENOXAPARIN PER 10 MG: Performed by: INTERNAL MEDICINE

## 2020-09-16 PROCEDURE — 83615 LACTATE (LD) (LDH) ENZYME: CPT | Performed by: INTERNAL MEDICINE

## 2020-09-16 PROCEDURE — 99232 SBSQ HOSP IP/OBS MODERATE 35: CPT | Performed by: FAMILY MEDICINE

## 2020-09-16 PROCEDURE — 80048 BASIC METABOLIC PNL TOTAL CA: CPT | Performed by: FAMILY MEDICINE

## 2020-09-16 PROCEDURE — 80076 HEPATIC FUNCTION PANEL: CPT

## 2020-09-16 PROCEDURE — 82550 ASSAY OF CK (CPK): CPT | Performed by: INTERNAL MEDICINE

## 2020-09-16 PROCEDURE — 82728 ASSAY OF FERRITIN: CPT | Performed by: INTERNAL MEDICINE

## 2020-09-16 RX ORDER — QUETIAPINE FUMARATE 25 MG/1
25 TABLET, FILM COATED ORAL NIGHTLY PRN
Status: DISCONTINUED | OUTPATIENT
Start: 2020-09-16 | End: 2020-09-18 | Stop reason: HOSPADM

## 2020-09-16 RX ADMIN — FAMOTIDINE 20 MG: 20 TABLET, FILM COATED ORAL at 08:13

## 2020-09-16 RX ADMIN — BENZOCAINE AND MENTHOL 1 LOZENGE: 15; 3.6 LOZENGE ORAL at 20:11

## 2020-09-16 RX ADMIN — BENZONATATE 200 MG: 100 CAPSULE ORAL at 20:13

## 2020-09-16 RX ADMIN — DEXAMETHASONE 6 MG: 4 TABLET ORAL at 08:12

## 2020-09-16 RX ADMIN — GUAIFENESIN AND DEXTROMETHORPHAN HYDROBROMIDE 1 TABLET: 600; 30 TABLET, EXTENDED RELEASE ORAL at 20:10

## 2020-09-16 RX ADMIN — SODIUM CHLORIDE, PRESERVATIVE FREE 10 ML: 5 INJECTION INTRAVENOUS at 19:44

## 2020-09-16 RX ADMIN — QUETIAPINE FUMARATE 25 MG: 25 TABLET ORAL at 20:13

## 2020-09-16 RX ADMIN — FAMOTIDINE 20 MG: 20 TABLET, FILM COATED ORAL at 19:56

## 2020-09-16 RX ADMIN — OXYCODONE HYDROCHLORIDE AND ACETAMINOPHEN 1000 MG: 500 TABLET ORAL at 12:33

## 2020-09-16 RX ADMIN — OXYCODONE HYDROCHLORIDE AND ACETAMINOPHEN 1000 MG: 500 TABLET ORAL at 17:26

## 2020-09-16 RX ADMIN — ENOXAPARIN SODIUM 40 MG: 40 INJECTION SUBCUTANEOUS at 08:13

## 2020-09-16 RX ADMIN — OXYCODONE HYDROCHLORIDE AND ACETAMINOPHEN 1000 MG: 500 TABLET ORAL at 19:44

## 2020-09-16 RX ADMIN — SODIUM CHLORIDE, PRESERVATIVE FREE 10 ML: 5 INJECTION INTRAVENOUS at 08:14

## 2020-09-16 RX ADMIN — REMDESIVIR 100 MG: 100 INJECTION, POWDER, LYOPHILIZED, FOR SOLUTION INTRAVENOUS at 17:26

## 2020-09-16 RX ADMIN — OXYCODONE HYDROCHLORIDE AND ACETAMINOPHEN 1000 MG: 500 TABLET ORAL at 08:13

## 2020-09-16 NOTE — PROGRESS NOTES
Continued Stay Note  [facility]     Patient Name: Jono Mcintyre  MRN: [id number]  Today's Date: 9/16/2020    Admit Date: 9/12/2020    Discharge Plan     Row Name 09/16/20 1107       Plan    Plan  CM update    Plan Comments  Patient continues treatment with Remdesivir - He is independent of ADL's but CM will continue to follow for possible home oxygen need and Home health/Current health orders closer to discharge - tyler [phone]    Final Discharge Disposition Code  30 - still a patient        Discharge Codes    No documentation.             Tyler Martinez RN

## 2020-09-16 NOTE — PROGRESS NOTES
INFECTIOUS DISEASE CONSULT/INITIAL HOSPITAL VISIT    Jono Mcintyre  1975  [id number]    Date of Consult: 9/13/20  Admission Date: 9/12/2020      Requesting Provider: FRANKLYN Cardenas  Evaluating Physician: Dr. Kevyn Norton    Reason for Consultation: COVID-19 pneumonia    Chief complaint: COVID-19 pneumonia and shortness of breath at night.    History of present illness:    Mr. Jono Mcintyre is a 44 y.o. male who is being evaluated for COVID-19 pneumonia.  He has a past medical history significant for hyperlipidemia, hypertension and obstructive sleep apnea on BiPAP at night.  He states that he began to feel short of breath and began running a fever up to 102 that began Tuesday 9/7.  He states that he was tested for COVID-19 at [facility] pharmacy on Thursday 9/10 and he was informed of the positive results on 9/11.  He continued to have shortness of breath, excessive diarrhea and fever and states that he was called by the health department on Saturday 9/12 and it was recommended by the HD that he presented to the ED for evaluation secondary to his symptoms.    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  He states that his wife has felt ill over the past week but is currently feeling better.  He has 2 children who have been asymptomatic with no signs and symptoms of illness.    Since arrival he has had T-max of 100.9 and O2 sats have been 93-95% on room air except he has not been sleeping well and SPO2 may be dropping at night.  Initial labs show he is without leukocytosis but he is neutrophilic with an ANC of 7.07.  D-dimer, LDH, CRP, lactate, ferritin and pro calcitonin are all elevated.  Creatinine is currently elevated at 1.36.  Repeat COVID-19 testing on 9/13 has returned positive.  CT scan showed bilateral multifocal pneumonia.  GI panel PCR is in progress.    He has no known antibiotic allergies and is currently receiving Decadron therapy.      Subjective:      9/14/20: febrile overnight up to 102 F. No shortness of breath or cough. No chest pain. Ambulating O2 sats about 95-96% today. Patient not interested in getting Remdesivir or plasma. Would like to stay in the hospital longer as he doesn't think that he can be a dad right now as he doesn't feel well. His wife is also sick at home.     9/15/20:  Feeling a lot better and he thinks that the Remdesivir is helping. Diarrhea is starting to slow down. Fatigue is improving. His hypoxia is improving. No fevers. No productive cough    Past Medical History:   Diagnosis Date   • Hyperlipidemia    • Hypertension    • Sleep apnea        History reviewed. No pertinent surgical history.    Family History   Problem Relation Age of Onset   • Hypertension Father    • Hypertension Other    • Heart disease Other    • Stroke Other    • Diabetes Other    • Alzheimer's disease Other        Social History     Socioeconomic History   • Marital status:      Spouse name: Not on file   • Number of children: Not on file   • Years of education: Not on file   • Highest education level: Not on file   Tobacco Use   • Smoking status: Never Smoker   • Smokeless tobacco: Never Used   Substance and Sexual Activity   • Alcohol use: Yes   • Drug use: Never   • Sexual activity: Yes     Partners: Female     Birth control/protection: None     Comment: spouse       Allergies   Allergen Reactions   • No Known Drug Allergy          Medication:    Current Facility-Administered Medications:   •  acetaminophen (TYLENOL) tablet 650 mg, 650 mg, Oral, Q4H PRN, 650 mg at 09/15/20 1207 **OR** acetaminophen (TYLENOL) 160 MG/5ML solution 650 mg, 650 mg, Oral, Q4H PRN **OR** acetaminophen (TYLENOL) suppository 650 mg, 650 mg, Rectal, Q4H PRN, Tristan, Karlee, APRN  •  benzocaine-menthol (CEPACOL) lozenge 1 lozenge, 1 lozenge, Mouth/Throat, Q2H PRN, Kash Smith PA-C, 1 lozenge at 09/15/20 0513  •  benzonatate (TESSALON) capsule 200 mg, 200 mg, Oral, TID  PRN, Tristan, Karlee, APRN, 200 mg at 09/15/20 2053  •  dexamethasone (DECADRON) tablet 6 mg, 6 mg, Oral, Daily With Breakfast, Ara Ferguson MD, 6 mg at 09/15/20 0941  •  enoxaparin (LOVENOX) syringe 40 mg, 40 mg, Subcutaneous, Q24H, Ara Ferguson MD, 40 mg at 09/15/20 0941  •  famotidine (PEPCID) tablet 20 mg, 20 mg, Oral, BID AC, SarcineLele duffy, APRN, 20 mg at 09/15/20 1701  •  guaifenesin-dextromethorphan (MUCINEX DM) tablet 1 tablet, 1 tablet, Oral, BID PRN, Ara Ferguson MD, 1 tablet at 09/15/20 0143  •  [COMPLETED] INV GS-5734 remdesivir 200 mg in sodium chloride 0.9 % 250 mL IVPB (powder vial), 200 mg, Intravenous, Q24H, 200 mg at 09/14/20 1645 **FOLLOWED BY** INV GS-5734 remdesivir 100 mg in sodium chloride 0.9 % 250 mL IVPB (powder vial), 100 mg, Intravenous, Q24H, Florence Lipscomb, PharmD, 100 mg at 09/15/20 1701  •  Pharmacy Consult - Remdesivir, , Does not apply, Continuous PRN, Kevyn Norton MD  •  QUEtiapine (SEROquel) tablet 25 mg, 25 mg, Oral, Nightly, Ara Ferguson MD, 25 mg at 09/15/20 2053  •  sodium chloride 0.9 % flush 10 mL, 10 mL, Intravenous, Q12H, Tristan, Karlee, APRN, 10 mL at 09/15/20 2054  •  sodium chloride 0.9 % flush 10 mL, 10 mL, Intravenous, PRN, Tristan, Karlee, APRN  •  sodium chloride 0.9 % infusion, 50 mL/hr, Intravenous, Continuous, Ara Ferguson MD, Stopped at 09/15/20 1820  •  vitamin C (ASCORBIC ACID) tablet 1,000 mg, 1,000 mg, Oral, 4x Daily, Lele Garrison APRN, 1,000 mg at 09/15/20 2053    Antibiotics:  Anti-Infectives (From admission, onward)    Ordered     Dose/Rate Route Frequency Start Stop    09/14/20 1549  INV GS-5734 remdesivir 100 mg in sodium chloride 0.9 % 250 mL IVPB (powder vial)     Ordering Provider: Florence Lipscomb PharmD    100 mg  over 30 Minutes Intravenous Every 24 Hours 09/15/20 1600 09/19/20 1559    09/14/20 1549  INV GS-5734 remdesivir 200 mg in sodium chloride 0.9 % 250 mL IVPB (powder vial)     Ordering Provider:  Florence Lipscomb, PharmD    200 mg  over 60 Minutes Intravenous Every 24 Hours 20 1600 20 0867            Review of Systems:  As above    Physical Exam:   Vital Signs  Temp (24hrs), Av.9 °F (37.2 °C), Min:97.2 °F (36.2 °C), Max:101.8 °F (38.8 °C)    Temp  Min: 97.2 °F (36.2 °C)  Max: 101.8 °F (38.8 °C)  BP  Min: 109/94  Max: 145/87  Pulse  Min: 67  Max: 99  Resp  Min: 17  Max: 20  SpO2  Min: 92 %  Max: 99 %    Due to the current COVID-19 Pandemic and critical shortage of PPE, the patient was interviewed over the phone and exam was conducted through direct visualization of the patient through his window.    GENERAL: Awake and alert, in no acute distress. Standing up in room  HEENT: Normocephalic, atraumatic.  No external oral lesions  Eyes:  No conjunctival injection. No icterus.  HEART: RRR per heart monitor   LUNGS: non-labored breathing on room air. No use of accessory muscles.  ABDOMEN: Soft, nontender- patient assisted exam  EXT:  No cyanosis, clubbing or edema. No cord.  :   Without Armando catheter.  SKIN: no rashes noted over exposed skin. No jaundice  NEURO: Oriented to PPT. Normal speech and cognition  PSYCHIATRIC: Normal insight and judgement. Cooperative with PE    Laboratory Data    Results from last 7 days   Lab Units 20  0505 20  0525 20  0002   WBC 10*3/mm3 13.18* 8.66 8.44   HEMOGLOBIN g/dL 13.5 13.1 14.1   HEMATOCRIT % 40.1 39.2 43.4   PLATELETS 10*3/mm3 218 156 165     Results from last 7 days   Lab Units 20  0505   SODIUM mmol/L 131*   POTASSIUM mmol/L 3.9   CHLORIDE mmol/L 97*   CO2 mmol/L 19.0*   BUN mg/dL 19   CREATININE mg/dL 1.19   GLUCOSE mg/dL 163*   CALCIUM mg/dL 9.1     Results from last 7 days   Lab Units 09/15/20  0728   ALK PHOS U/L 57   BILIRUBIN mg/dL 0.6   BILIRUBIN DIRECT mg/dL 0.2   ALT (SGPT) U/L 41   AST (SGOT) U/L 51*     Results from last 7 days   Lab Units 20  0505   SED RATE mm/hr 71*     Results from last 7 days   Lab Units  09/14/20  0505   CRP mg/dL 14.77*     Results from last 7 days   Lab Units 09/14/20  0505   LACTATE mmol/L 1.4     Results from last 7 days   Lab Units 09/14/20  0505   CK TOTAL U/L 475*         Estimated Creatinine Clearance: 97.4 mL/min (by C-G formula based on SCr of 1.19 mg/dL).      Microbiology:  9/13-respiratory panel PCR positive COVID-19      Radiology:  Imaging Results (Last 72 Hours)     Procedure Component Value Units Date/Time    CT Chest Pulmonary Embolism [[id number]] Collected: 09/13/20 0216     Updated: 09/13/20 0218    Narrative:      CT CHEST PULMONARY EMBOLISM W CONTRAST    INDICATION:   Pneumonia with fever    TECHNIQUE:   CT angiogram of the chest with 100 cc of Isovue-300 IV contrast. 3-D reconstructions were obtained and reviewed.   Radiation dose reduction techniques included automated exposure control or exposure modulation based on body size. Count of known CT and  cardiac nuc med studies performed in previous 12 months: 0.     COMPARISON:   None available.    FINDINGS:   Chest images at mediastinal window show no adenopathy or effusions. There are no pulmonary artery filling defects to suggest emboli. Hepatic steatosis is noted. The CT angiographic images also show no evidence of emboli.    Chest images at lung window show multifocal groundglass infiltrates more prominent in the mid to lower lung fields and peripherally. Commonly reported imaging features of COVID-19 pneumonia are present. Other processes such as influenza pneumonia and  organizing pneumonia can cause a similar imaging pattern.      Impression:      Bilateral multifocal pneumonia. No evidence of pulmonary embolism. No evidence of pleural or pericardial fluid. Hepatic steatosis is also seen.    Signer Name: Pablo Guerra MD   Signed: 9/13/2020 2:16 AM   Workstation Name: [id number]    [facility]          Impression:   1. Sepsis presenting with fever, elevated inflammation markers and bilateral  multifocal pneumonia  2. COVID-19 pneumonia- some concern given fever, mild hypoxia, and significant elevated CRP. At risk for further decline  3. Acute hypoxic respiratory failure- improved today. Stable on room air  4. Acute kidney injury secondary to dehydration from COVID-19 infection  5. Dehydration secondary to diarrhea  6. Diarrhea  7. Hypertension  8. GERD  9. Obstructive sleep apnea-uses CPAP at night    PLAN/RECOMMENDATIONS:   Thank you for asking us to see Jono Mcintyre, I recommend the followin. Continue to follow cultures   2. Obtain a.m. labs include CBC, CMP, CRP, and ferritin. Will need to closely monitor renal and hepatic function while on Remdesivir  3. Baseline IL-6 level- sent  4. Decardon 6 mg PO Daily x 10 days  5. Remdesivir 200 mg IV x1, then 100 mg IV daily x 4 additional days for a total 5 day course  6. Continue to hold antibiotics for now  7. Continue supportive care with O2 therapy prn. Currently off O2  8. Continue IVFs per hospitalist service given his TY in the setting of diarrhea (diarrhea likely secondary to COVID-19 infection)  9. Continue anticoagulation therapy with Lovenox as patient is at risk for DVT/PE with COVID-19 infection    I discussed with Dr. Ferguson today    Will continue to monitor inpatient on Remdesivir. He seem to be improving on this medication. Given his mild hypoxia at the start of his admission, could be useful to complete a 5 days course inpatient in order to prevent clinical decline and readmission.    Kevyn Norton MD  9/15/2020  22:46 EDT

## 2020-09-16 NOTE — PLAN OF CARE
Goal Outcome Evaluation:  Plan of Care Reviewed With: patient  Progress: improving  Pts VSS, Cpap worn while resting, reji on tele while resting. SCDs and NS infusion  Refused this shift. Pt denies pain/discomfort. Cont to monitor

## 2020-09-16 NOTE — PROGRESS NOTES
[facility] Medicine Services  PROGRESS NOTE    Patient Name: Jono Mcintyre  : 1975  MRN: [id number]    Date of Admission: 2020  Primary Care Physician: Freddie Zarco MD    Subjective   Subjective     CC:  Diarrhea    HPI:  Patient is a 44-year-old who presented with sepsis (criteria leukocytosis, fever) and symptoms of profuse diarrhea, abdominal pain and was found to have COVID 19.  He also has a cough and mild shortness of breath.  His CT scan revealed bilateral multifocal pneumonia.    2020-patient reports his diarrhea is almost resolved.  Overall he feels much better.  He would like to take a shower today.  He is drinking lots of water so fluids have been discontinued.  He is eating regular diet as well.  No nausea or vomiting.  Mild cough but getting better.  No fever overnight.    Review of Systems  General: Positive fever on 9/15/2020, denies chills, positive fatigue  ENT: No sore throat, trouble swallowing or changes in vision  Respiratory: Mild shortness of breath, mild cough, denies wheezing or fast breathing  Cardiovascular: No chest pain, palpitations, dyspnea with exertion  Gastrointestinal: No nausea vomiting abdominal pain, diarrhea improving  Musculoskeletal: No difficulty walking, no weakness  Vascular: No cyanosis or clubbing  Lymphatic: No peripheral edema, no lymphadenopathy  Neurologic: No headache, confusion, dizziness  Psychiatric: No changes in mood.  No depression or anxiety.       Objective   Objective     Vital Signs:   Temp:  [97 °F (36.1 °C)-98.7 °F (37.1 °C)] 97.2 °F (36.2 °C)  Heart Rate:  [59-99] 60  Resp:  [17-20] 17  BP: (120-152)/(73-87) 130/73        Physical Exam:  With patient's consent, physical exam was conducted via visual telemedicine encounter with bedside portion accommodated by nursing staff due to patient's current isolation requirements in the interest of PPE conservation.    Constitutional: No acute distress, awake,  alert, nontoxic, normal body habitus  HENT: NCAT, MMM, no conjunctival injection  Respiratory: good effort, nonlabored respirations, uses CPAP at night, O2 sat 97% while on CPAP  Cardiovascular: tele with NSR  Musculoskeletal: No edema, normal muscle tone and mass for age  GI: Soft, no grimace, nondistended  Psychiatric: Appropriate affect, good insight and judgement, cooperative  Neurologic: Oriented x 3, movements symmetric BUE and BLE, speech clear and fluent  Skin: No visible rashes, no jaundice seen         Results Reviewed:  Results from last 7 days   Lab Units 09/14/20  0505 09/13/20  0525 09/13/20  0002   WBC 10*3/mm3 13.18* 8.66 8.44   HEMOGLOBIN g/dL 13.5 13.1 14.1   HEMATOCRIT % 40.1 39.2 43.4   PLATELETS 10*3/mm3 218 156 165   PROCALCITONIN ng/mL 0.64*  --  1.11*     Results from last 7 days   Lab Units 09/16/20  0705 09/15/20  0728 09/14/20  0505 09/13/20  0525 09/13/20  0002   SODIUM mmol/L  --   --  131* 131* 132*   POTASSIUM mmol/L  --   --  3.9 3.8 4.0   CHLORIDE mmol/L  --   --  97* 98 93*   CO2 mmol/L  --   --  19.0* 20.0* 22.0   BUN mg/dL  --   --  19 19 19   CREATININE mg/dL  --   --  1.19 1.36* 1.39*   GLUCOSE mg/dL  --   --  163* 159* 164*   CALCIUM mg/dL  --   --  9.1 9.2 9.5   ALT (SGPT) U/L 40 41 37 36 39   AST (SGOT) U/L 29 51* 42* 48* 47*     Estimated Creatinine Clearance: 96.5 mL/min (by C-G formula based on SCr of 1.19 mg/dL).    Microbiology Results Abnormal     Procedure Component Value - Date/Time    Gastrointestinal Panel, PCR - Stool, Per Rectum [[id number]]  (Normal) Collected: 09/13/20 0842    Lab Status: Final result Specimen: Stool from Per Rectum Updated: 09/13/20 1116     Campylobacter Not Detected     Plesiomonas shigelloides Not Detected     Salmonella Not Detected     Vibrio Not Detected     Vibrio cholerae Not Detected     Yersinia enterocolitica Not Detected     Enteroaggregative E. coli (EAEC) Not Detected     Enteropathogenic E. coli (EPEC) Not Detected      Enterotoxigenic E. coli (ETEC) lt/st Not Detected     Shiga-like toxin-producing E. coli (STEC) stx1/stx2 Not Detected     E. coli O157 Not Detected     Shigella/Enteroinvasive E. coli (EIEC) Not Detected     Cryptosporidium Not Detected     Cyclospora cayetanensis Not Detected     Entamoeba histolytica Not Detected     Giardia lamblia Not Detected     Adenovirus F40/41 Not Detected     Astrovirus Not Detected     Norovirus GI/GII Not Detected     Rotavirus A Not Detected     Sapovirus (I, II, IV or V) Not Detected    Respiratory Panel PCR w/COVID-19(SARS-CoV-2) SUMAN/ISSA/KRIS/PAD/COR/M[facility] In-House, NP Swab in UTM/VTM, 3-4 HR TAT - Swab, Nasopharynx [[id number]]  (Abnormal) Collected: 09/13/20 0312    Lab Status: Final result Specimen: Swab from Nasopharynx Updated: 09/13/20 0529     ADENOVIRUS, PCR Not Detected     Coronavirus 229E Not Detected     Coronavirus HKU1 Not Detected     Coronavirus NL63 Not Detected     Coronavirus OC43 Not Detected     COVID19 Detected     Human Metapneumovirus Not Detected     Human Rhinovirus/Enterovirus Not Detected     Influenza A PCR Not Detected     Influenza A H1 Not Detected     Influenza A H1 2009 PCR Not Detected     Influenza A H3 Not Detected     Influenza B PCR Not Detected     Parainfluenza Virus 1 Not Detected     Parainfluenza Virus 2 Not Detected     Parainfluenza Virus 3 Not Detected     Parainfluenza Virus 4 Not Detected     RSV, PCR Not Detected     Bordetella pertussis pcr Not Detected     Bordetella parapertussis PCR Not Detected     Chlamydophila pneumoniae PCR Not Detected     Mycoplasma pneumo by PCR Not Detected    Narrative:      Fact sheet for providers: https://docs."[employer]/wp-content/uploads/FBJ9932-8769-FY6.1-EUA-Provider-Fact-Sheet-3.pdf    Fact sheet for patients: https://docs."[employer]/wp-content/uploads/IFJ7602-7037-LH4.1-EUA-Patient-Fact-Sheet-1.pdf          Imaging Results (Last 24 Hours)     ** No results found for the last 24 hours. **                    I have reviewed the medications:  Scheduled Meds:dexamethasone, 6 mg, Oral, Daily With Breakfast  enoxaparin, 40 mg, Subcutaneous, Q24H  famotidine, 20 mg, Oral, BID [facility] [id number] remdesivir in NS IVPB, 100 mg, Intravenous, Q24H  sodium chloride, 10 mL, Intravenous, Q12H  vitamin C, 1,000 mg, Oral, 4x Daily      Continuous Infusions:Pharmacy Consult - Remdesivir,       PRN Meds:.•  acetaminophen **OR** acetaminophen **OR** acetaminophen  •  benzocaine-menthol  •  benzonatate  •  guaifenesin-dextromethorphan  •  Pharmacy Consult - Remdesivir  •  sodium chloride    Assessment/Plan   Assessment & Plan     Active Hospital Problems    Diagnosis  POA   • **Sepsis (CMS/HCC) [A41.9]  No   • COVID-19 virus infection [U07.1]  Yes   • TY (acute kidney injury) (CMS/[facility]) [N17.9]  Yes   • HTN (hypertension) [I10]  Yes   • GERD without esophagitis [K21.9]  Yes      Resolved Hospital Problems   No resolved problems to display.        Brief Hospital Course to date:  Jono Mcintyre is a 44 y.o. male with history of hypertension, GERD, JOANA on BiPAP, presents with sepsis and symptoms of diarrhea, dyspnea on exertion, dry cough, fever admitted with COVID-19 pneumonia.     Plan  Sepsis with COVID-19 pneumonia  -Clinically sepsis is resolving now that he is afebrile  -respiratory panel PCR with COVID-19 is positive here (was also positive at [facility] prior to admission)  -CTA chest shows classic peripheral, bilateral GGO consistent with bilateral multifocal pneumonia from COVID-19  -ID consulted and following, initially declined convalescent plasma, patient is currently agreeable to Remdesivir which was started 9/14, continue Decadron, Lovenox twice daily.  -Anticipated date of completion of remdesivir is 9/18/2020  -Continue to follow-up COVID progression labs CMP, LDH, ferritin    Diarrhea  -Clinically improving  -Offered Imodium, patient currently declined     Hyponatremia  -Resolved     Acute kidney injury    - baseline Cr unknown  -Creatinine peaked to 1.39,  improved to 1.19     JOANA-continue home CPAP.     Hypertension  -BP initially low, was treated with fluids and held home ACEi .  -Continue to monitor blood pressure, resume home ACE inhibitor as needed       DVT Prophylaxis: Lovenox      Disposition: I expect the patient to be discharged pending completion of remdesivir.    CODE STATUS:   Code Status and Medical Interventions:   Ordered at: 09/13/20 0314     Level Of Support Discussed With:    Patient     Code Status:    CPR     Medical Interventions (Level of Support Prior to Arrest):    Full         Electronically signed by Kelly Chowdhury MD, 09/16/20, 9:14 AM EDT.

## 2020-09-17 LAB
ALBUMIN SERPL-MCNC: 3.7 G/DL (ref 3.5–5.2)
ALBUMIN/GLOB SERPL: 1.1 G/DL
ALP SERPL-CCNC: 52 U/L (ref 39–117)
ALT SERPL W P-5'-P-CCNC: 37 U/L (ref 1–41)
ANION GAP SERPL CALCULATED.3IONS-SCNC: 10 MMOL/L (ref 5–15)
AST SERPL-CCNC: 22 U/L (ref 1–40)
BILIRUB SERPL-MCNC: 0.5 MG/DL (ref 0–1.2)
BUN SERPL-MCNC: 27 MG/DL (ref 6–20)
BUN/CREAT SERPL: 24.1 (ref 7–25)
CALCIUM SPEC-SCNC: 9.2 MG/DL (ref 8.6–10.5)
CHLORIDE SERPL-SCNC: 103 MMOL/L (ref 98–107)
CK SERPL-CCNC: 89 U/L (ref 20–200)
CO2 SERPL-SCNC: 24 MMOL/L (ref 22–29)
CREAT SERPL-MCNC: 1.12 MG/DL (ref 0.76–1.27)
D DIMER PPP FEU-MCNC: 0.66 MCGFEU/ML (ref 0–0.56)
FERRITIN SERPL-MCNC: 1324 NG/ML (ref 30–400)
FIBRINOGEN PPP-MCNC: 606 MG/DL (ref 215–430)
GFR SERPL CREATININE-BSD FRML MDRD: 71 ML/MIN/1.73
GLOBULIN UR ELPH-MCNC: 3.3 GM/DL
GLUCOSE SERPL-MCNC: 118 MG/DL (ref 65–99)
POTASSIUM SERPL-SCNC: 4.4 MMOL/L (ref 3.5–5.2)
PROT SERPL-MCNC: 7 G/DL (ref 6–8.5)
SODIUM SERPL-SCNC: 137 MMOL/L (ref 136–145)

## 2020-09-17 PROCEDURE — 80053 COMPREHEN METABOLIC PANEL: CPT | Performed by: FAMILY MEDICINE

## 2020-09-17 PROCEDURE — 82728 ASSAY OF FERRITIN: CPT | Performed by: INTERNAL MEDICINE

## 2020-09-17 PROCEDURE — 25010000002 ENOXAPARIN PER 10 MG: Performed by: INTERNAL MEDICINE

## 2020-09-17 PROCEDURE — 99232 SBSQ HOSP IP/OBS MODERATE 35: CPT | Performed by: FAMILY MEDICINE

## 2020-09-17 PROCEDURE — 85384 FIBRINOGEN ACTIVITY: CPT | Performed by: INTERNAL MEDICINE

## 2020-09-17 PROCEDURE — 63710000001 DEXAMETHASONE PER 0.25 MG: Performed by: INTERNAL MEDICINE

## 2020-09-17 PROCEDURE — 85379 FIBRIN DEGRADATION QUANT: CPT | Performed by: INTERNAL MEDICINE

## 2020-09-17 PROCEDURE — XW033E5 INTRODUCTION OF REMDESIVIR ANTI-INFECTIVE INTO PERIPHERAL VEIN, PERCUTANEOUS APPROACH, NEW TECHNOLOGY GROUP 5: ICD-10-PCS | Performed by: FAMILY MEDICINE

## 2020-09-17 PROCEDURE — 82550 ASSAY OF CK (CPK): CPT | Performed by: INTERNAL MEDICINE

## 2020-09-17 RX ADMIN — OXYCODONE HYDROCHLORIDE AND ACETAMINOPHEN 1000 MG: 500 TABLET ORAL at 08:02

## 2020-09-17 RX ADMIN — FAMOTIDINE 20 MG: 20 TABLET, FILM COATED ORAL at 08:02

## 2020-09-17 RX ADMIN — FAMOTIDINE 20 MG: 20 TABLET, FILM COATED ORAL at 16:33

## 2020-09-17 RX ADMIN — BENZOCAINE AND MENTHOL 1 LOZENGE: 15; 3.6 LOZENGE ORAL at 21:28

## 2020-09-17 RX ADMIN — SODIUM CHLORIDE, PRESERVATIVE FREE 10 ML: 5 INJECTION INTRAVENOUS at 20:57

## 2020-09-17 RX ADMIN — QUETIAPINE FUMARATE 25 MG: 25 TABLET ORAL at 21:27

## 2020-09-17 RX ADMIN — GUAIFENESIN AND DEXTROMETHORPHAN HYDROBROMIDE 1 TABLET: 600; 30 TABLET, EXTENDED RELEASE ORAL at 21:27

## 2020-09-17 RX ADMIN — ENOXAPARIN SODIUM 40 MG: 40 INJECTION SUBCUTANEOUS at 08:02

## 2020-09-17 RX ADMIN — OXYCODONE HYDROCHLORIDE AND ACETAMINOPHEN 1000 MG: 500 TABLET ORAL at 13:07

## 2020-09-17 RX ADMIN — REMDESIVIR 100 MG: 100 INJECTION, POWDER, LYOPHILIZED, FOR SOLUTION INTRAVENOUS at 12:00

## 2020-09-17 RX ADMIN — OXYCODONE HYDROCHLORIDE AND ACETAMINOPHEN 1000 MG: 500 TABLET ORAL at 18:21

## 2020-09-17 RX ADMIN — BENZONATATE 200 MG: 100 CAPSULE ORAL at 21:27

## 2020-09-17 RX ADMIN — DEXAMETHASONE 6 MG: 4 TABLET ORAL at 08:02

## 2020-09-17 RX ADMIN — OXYCODONE HYDROCHLORIDE AND ACETAMINOPHEN 1000 MG: 500 TABLET ORAL at 20:55

## 2020-09-17 NOTE — PROGRESS NOTES
[facility]  PROGRESS NOTE    Patient Name: Jono Mcintyre  : 1975  MRN: [id number]    Date of Admission: 2020  Primary Care Physician: Freddie Zarco MD    Subjective   Subjective     CC:  Diarrhea    HPI:  Patient is a 44-year-old who presented with sepsis (criteria leukocytosis, fever) and symptoms of profuse diarrhea, abdominal pain and was found to have COVID 19.  He also has a cough and mild shortness of breath.  His CT scan revealed bilateral multifocal pneumonia.    2020-patient reports his diarrhea is almost resolved.  Overall he feels much better.  He would like to take a shower today.  He is drinking lots of water so fluids have been discontinued.  He is eating regular diet as well.  No nausea or vomiting.  Mild cough but getting better.  No fever overnight.    2020-patient is doing better today and has less complaints.  He is tolerating p.o. and ambulating.  He denies significant shortness of breath.  He still has an occasional cough.  No fever overnight.    Review of Systems  General: Positive fever on 9/15/2020, denies chills, positive fatigue  ENT: No sore throat, trouble swallowing or changes in vision  Respiratory: Mild shortness of breath, mild cough, denies wheezing or fast breathing  Cardiovascular: No chest pain, palpitations, dyspnea with exertion  Gastrointestinal: No nausea vomiting abdominal pain, diarrhea improving  Musculoskeletal: No difficulty walking, no weakness  Vascular: No cyanosis or clubbing  Lymphatic: No peripheral edema, no lymphadenopathy  Neurologic: No headache, confusion, dizziness  Psychiatric: No changes in mood.  No depression or anxiety.       Objective   Objective     Vital Signs:   Temp:  [97.2 °F (36.2 °C)-98.1 °F (36.7 °C)] 97.8 °F (36.6 °C)  Heart Rate:  [55-80] 65  Resp:  [16-20] 18  BP: (120-143)/(72-88) 120/72        Physical Exam:  With patient's consent, physical exam was conducted via  visual telemedicine encounter with bedside portion accommodated by nursing staff due to patient's current isolation requirements in the interest of PPE conservation.    Constitutional: No acute distress, awake, alert, nontoxic, normal body habitus  HENT: NCAT, MMM, no conjunctival injection  Respiratory: good effort, nonlabored respirations, uses CPAP at night, O2 sat 97% while on CPAP  Cardiovascular: tele with NSR  Musculoskeletal: No edema, normal muscle tone and mass for age  GI: Soft, no grimace, nondistended  Psychiatric: Appropriate affect, good insight and judgement, cooperative  Neurologic: Oriented x 3, movements symmetric BUE and BLE, speech clear and fluent  Skin: No visible rashes, no jaundice seen         Results Reviewed:  Results from last 7 days   Lab Units 09/14/20  0505 09/13/20  0525 09/13/20  0002   WBC 10*3/mm3 13.18* 8.66 8.44   HEMOGLOBIN g/dL 13.5 13.1 14.1   HEMATOCRIT % 40.1 39.2 43.4   PLATELETS 10*3/mm3 218 156 165   PROCALCITONIN ng/mL 0.64*  --  1.11*     Results from last 7 days   Lab Units 09/17/20  0626 09/16/20  0705 09/15/20  0728 09/14/20  0505   SODIUM mmol/L 137 135*  --  131*   POTASSIUM mmol/L 4.4 4.7  --  3.9   CHLORIDE mmol/L 103 98  --  97*   CO2 mmol/L 24.0 18.0*  --  19.0*   BUN mg/dL 27* 30*  --  19   CREATININE mg/dL 1.12 1.22  --  1.19   GLUCOSE mg/dL 118* 106*  --  163*   CALCIUM mg/dL 9.2 9.6  --  9.1   ALT (SGPT) U/L 37 40 41 37   AST (SGOT) U/L 22 29 51* 42*     Estimated Creatinine Clearance: 102.5 mL/min (by C-G formula based on SCr of 1.12 mg/dL).    Microbiology Results Abnormal     Procedure Component Value - Date/Time    Gastrointestinal Panel, PCR - Stool, Per Rectum [[id number]]  (Normal) Collected: 09/13/20 0842    Lab Status: Final result Specimen: Stool from Per Rectum Updated: 09/13/20 1116     Campylobacter Not Detected     Plesiomonas shigelloides Not Detected     Salmonella Not Detected     Vibrio Not Detected     Vibrio cholerae Not Detected      Yersinia enterocolitica Not Detected     Enteroaggregative E. coli (EAEC) Not Detected     Enteropathogenic E. coli (EPEC) Not Detected     Enterotoxigenic E. coli (ETEC) lt/st Not Detected     Shiga-like toxin-producing E. coli (STEC) stx1/stx2 Not Detected     E. coli O157 Not Detected     Shigella/Enteroinvasive E. coli (EIEC) Not Detected     Cryptosporidium Not Detected     Cyclospora cayetanensis Not Detected     Entamoeba histolytica Not Detected     Giardia lamblia Not Detected     Adenovirus F40/41 Not Detected     Astrovirus Not Detected     Norovirus GI/GII Not Detected     Rotavirus A Not Detected     Sapovirus (I, II, IV or V) Not Detected    Respiratory Panel PCR w/COVID-19(SARS-CoV-2) SUMAN/ISSA/KRIS/PAD/COR/MAD In-House, NP Swab in UTM/VTM, 3-4 HR TAT - Swab, Nasopharynx [[id number]]  (Abnormal) Collected: 09/13/20 0312    Lab Status: Final result Specimen: Swab from Nasopharynx Updated: 09/13/20 0529     ADENOVIRUS, PCR Not Detected     Coronavirus 229E Not Detected     Coronavirus HKU1 Not Detected     Coronavirus NL63 Not Detected     Coronavirus OC43 Not Detected     COVID19 Detected     Human Metapneumovirus Not Detected     Human Rhinovirus/Enterovirus Not Detected     Influenza A PCR Not Detected     Influenza A H1 Not Detected     Influenza A H1 2009 PCR Not Detected     Influenza A H3 Not Detected     Influenza B PCR Not Detected     Parainfluenza Virus 1 Not Detected     Parainfluenza Virus 2 Not Detected     Parainfluenza Virus 3 Not Detected     Parainfluenza Virus 4 Not Detected     RSV, PCR Not Detected     Bordetella pertussis pcr Not Detected     Bordetella parapertussis PCR Not Detected     Chlamydophila pneumoniae PCR Not Detected     Mycoplasma pneumo by PCR Not Detected    Narrative:      Fact sheet for providers: https://docs.[email or web address]/wp-content/uploads/SWG5543-9120-UJ5.1-EUA-Provider-Fact-Sheet-3.pdf    Fact sheet for patients:  https://docs.[employer]/wp-content/uploads/SEF7856-3288-TO9.1-EUA-Patient-Fact-Sheet-1.pdf          Imaging Results (Last 24 Hours)     ** No results found for the last 24 hours. **               I have reviewed the medications:  Scheduled Meds:dexamethasone, 6 mg, Oral, Daily With Breakfast  enoxaparin, 40 mg, Subcutaneous, Q24H  famotidine, 20 mg, Oral, BID [facility] [id number] remdesivir in NS IVPB, 100 mg, Intravenous, Q24H  sodium chloride, 10 mL, Intravenous, Q12H  vitamin C, 1,000 mg, Oral, 4x Daily      Continuous Infusions:Pharmacy Consult - Remdesivir,       PRN Meds:.•  acetaminophen **OR** acetaminophen **OR** acetaminophen  •  benzocaine-menthol  •  benzonatate  •  guaifenesin-dextromethorphan  •  Pharmacy Consult - Remdesivir  •  QUEtiapine  •  sodium chloride    Assessment/Plan   Assessment & Plan     Active Hospital Problems    Diagnosis  POA   • **Sepsis (CMS/HCC) [A41.9]  No   • COVID-19 virus infection [U07.1]  Yes   • TY (acute kidney injury) (CMS/HCC) [N17.9]  Yes   • HTN (hypertension) [I10]  Yes   • GERD without esophagitis [K21.9]  Yes      Resolved Hospital Problems   No resolved problems to display.        Brief Hospital Course to date:  Jono Mcintyre is a 44 y.o. male with history of hypertension, GERD, JOANA on BiPAP, presents with sepsis and symptoms of diarrhea, dyspnea on exertion, dry cough, fever admitted with COVID-19 pneumonia.     Plan  Sepsis with COVID-19 pneumonia  -Clinically sepsis is resolving now that he is afebrile  -respiratory panel PCR with COVID-19 is positive here (was also positive at [facility] prior to admission)  -CTA chest shows classic peripheral, bilateral GGO consistent with bilateral multifocal pneumonia from COVID-19  -ID consulted and following, initially declined convalescent plasma, patient is currently agreeable to Remdesivir which was started 9/14, continue Decadron, Lovenox twice daily.  -Anticipated date of completion of remdesivir is 9/18/2020  -Continue  to follow-up COVID progression labs CMP, LDH, ferritin    Diarrhea  -Clinically improving  -Offered Imodium, patient currently declined     Hyponatremia  -Resolved     Acute kidney injury   - baseline Cr unknown  -Creatinine peaked to 1.39,  improved to 1.19     JOANA-continue home CPAP.     Hypertension  -BP initially low, was treated with fluids and held home ACEi .  -Continue to monitor blood pressure, resume home ACE inhibitor as needed       DVT Prophylaxis: Lovenox      Disposition: I expect the patient to be discharged pending completion of remdesivir.    CODE STATUS:   Code Status and Medical Interventions:   Ordered at: 09/13/20 0314     Level Of Support Discussed With:    Patient     Code Status:    CPR     Medical Interventions (Level of Support Prior to Arrest):    Full         Electronically signed by Kelly Chowdhury MD, 09/17/20, 9:17 AM EDT.

## 2020-09-17 NOTE — PROGRESS NOTES
INFECTIOUS DISEASE CONSULT/INITIAL HOSPITAL VISIT    Jono Mcintyer  1975  [id number]    Date of Consult: 9/13/20  Admission Date: 9/12/2020      Requesting Provider: FRANKLYN Cardenas  Evaluating Physician: Dr. Kevyn Norton    Reason for Consultation: COVID-19 pneumonia    Chief complaint: COVID-19 pneumonia and shortness of breath at night.    History of present illness:    Mr. Jono Mcintyre is a 44 y.o. male who is being evaluated for COVID-19 pneumonia.  He has a past medical history significant for hyperlipidemia, hypertension and obstructive sleep apnea on BiPAP at night.  He states that he began to feel short of breath and began running a fever up to 102 that began Tuesday 9/7.  He states that he was tested for COVID-19 at [facility] pharmacy on Thursday 9/10 and he was informed of the positive results on 9/11.  He continued to have shortness of breath, excessive diarrhea and fever and states that he was called by the health department on Saturday 9/12 and it was recommended by the HD that he presented to the ED for evaluation secondary to his symptoms.    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  He states that his wife has felt ill over the past week but is currently feeling better.  He has 2 children who have been asymptomatic with no signs and symptoms of illness.    Since arrival he has had T-max of 100.9 and O2 sats have been 93-95% on room air except he has not been sleeping well and SPO2 may be dropping at night.  Initial labs show he is without leukocytosis but he is neutrophilic with an ANC of 7.07.  D-dimer, LDH, CRP, lactate, ferritin and pro calcitonin are all elevated.  Creatinine is currently elevated at 1.36.  Repeat COVID-19 testing on 9/13 has returned positive.  CT scan showed bilateral multifocal pneumonia.  GI panel PCR is in progress.    He has no known antibiotic allergies and is currently receiving Decadron therapy.      Subjective:      9/14/20: febrile overnight up to 102 F. No shortness of breath or cough. No chest pain. Ambulating O2 sats about 95-96% today. Patient not interested in getting Remdesivir or plasma. Would like to stay in the hospital longer as he doesn't think that he can be a dad right now as he doesn't feel well. His wife is also sick at home.     9/15/20:  Feeling a lot better and he thinks that the Remdesivir is helping. Diarrhea is starting to slow down. Fatigue is improving. His hypoxia is improving. No fevers. No productive cough    9/16/20:  The patient complains of some fullness that is mainly in his left ear but no ear pain.  This is improved this afternoon.  No fevers.  No shortness of breath or productive cough.  Diarrhea is improving.  Fatigue is improving.  No new rashes.    Past Medical History:   Diagnosis Date   • Hyperlipidemia    • Hypertension    • Sleep apnea        History reviewed. No pertinent surgical history.    Family History   Problem Relation Age of Onset   • Hypertension Father    • Hypertension Other    • Heart disease Other    • Stroke Other    • Diabetes Other    • Alzheimer's disease Other        Social History     Socioeconomic History   • Marital status:      Spouse name: Not on file   • Number of children: Not on file   • Years of education: Not on file   • Highest education level: Not on file   Tobacco Use   • Smoking status: Never Smoker   • Smokeless tobacco: Never Used   Substance and Sexual Activity   • Alcohol use: Yes   • Drug use: Never   • Sexual activity: Yes     Partners: Female     Birth control/protection: None     Comment: spouse       Allergies   Allergen Reactions   • No Known Drug Allergy          Medication:    Current Facility-Administered Medications:   •  acetaminophen (TYLENOL) tablet 650 mg, 650 mg, Oral, Q4H PRN, 650 mg at 09/15/20 1207 **OR** acetaminophen (TYLENOL) 160 MG/5ML solution 650 mg, 650 mg, Oral, Q4H PRN **OR** acetaminophen (TYLENOL) suppository  650 mg, 650 mg, Rectal, Q4H PRN, Tristan, Karlee, APRN  •  benzocaine-menthol (CEPACOL) lozenge 1 lozenge, 1 lozenge, Mouth/Throat, Q2H PRN, Kash Smith PA-C, 1 lozenge at 09/16/20 2011  •  benzonatate (TESSALON) capsule 200 mg, 200 mg, Oral, TID PRN, Tristan, Karlee, APRN, 200 mg at 09/16/20 2013  •  dexamethasone (DECADRON) tablet 6 mg, 6 mg, Oral, Daily With Breakfast, Ara Ferguson MD, 6 mg at 09/16/20 0812  •  enoxaparin (LOVENOX) syringe 40 mg, 40 mg, Subcutaneous, Q24H, Ara Ferguson MD, 40 mg at 09/16/20 0813  •  famotidine (PEPCID) tablet 20 mg, 20 mg, Oral, BID AC, Lele Garrison APRN, 20 mg at 09/16/20 1956  •  guaifenesin-dextromethorphan (MUCINEX DM) tablet 1 tablet, 1 tablet, Oral, BID PRN, Ara Ferguson MD, 1 tablet at 09/16/20 2010  •  [COMPLETED] INV GS-5734 remdesivir 200 mg in sodium chloride 0.9 % 250 mL IVPB (powder vial), 200 mg, Intravenous, Q24H, 200 mg at 09/14/20 1645 **FOLLOWED BY** INV GS-5734 remdesivir 100 mg in sodium chloride 0.9 % 250 mL IVPB (powder vial), 100 mg, Intravenous, Q24H, Florence Lipscomb, PharmD, 100 mg at 09/16/20 1726  •  Pharmacy Consult - Remdesivir, , Does not apply, Continuous PRN, Kevyn Norton MD  •  QUEtiapine (SEROquel) tablet 25 mg, 25 mg, Oral, Nightly PRN, Kelly Chowdhury MD, 25 mg at 09/16/20 2013  •  sodium chloride 0.9 % flush 10 mL, 10 mL, Intravenous, Q12H, Tristan, Karlee, APRN, 10 mL at 09/16/20 1944  •  sodium chloride 0.9 % flush 10 mL, 10 mL, Intravenous, PRN, Karlee Hudson APRN  •  vitamin C (ASCORBIC ACID) tablet 1,000 mg, 1,000 mg, Oral, 4x Daily, Lele Garrison APRN, 1,000 mg at 09/16/20 1944    Antibiotics:  Anti-Infectives (From admission, onward)    Ordered     Dose/Rate Route Frequency Start Stop    09/14/20 1549  [facility]-5734 remdesivir 100 mg in sodium chloride 0.9 % 250 mL IVPB (powder vial)     Ordering Provider: Florence Lipscomb, Katelyn    100 mg  over 30 Minutes Intravenous Every 24 Hours  09/15/20 1600 20 1559    20 1549  [facility] GS-5734 remdesivir 200 mg in sodium chloride 0.9 % 250 mL IVPB (powder vial)     Ordering Provider: Florence Lipscomb PharmD    200 mg  over 60 Minutes Intravenous Every 24 Hours 20 1600 20 9087            Review of Systems:  As above    Physical Exam:   Vital Signs  Temp (24hrs), Av.3 °F (36.3 °C), Min:97 °F (36.1 °C), Max:98.1 °F (36.7 °C)    Temp  Min: 97 °F (36.1 °C)  Max: 98.1 °F (36.7 °C)  BP  Min: 125/78  Max: 152/87  Pulse  Min: 55  Max: 81  Resp  Min: 16  Max: 20  SpO2  Min: 94 %  Max: 97 %    Due to the current COVID-19 Pandemic and critical shortage of PPE, the patient was interviewed over the phone and exam was conducted through direct visualization of the patient through his window.    GENERAL: Awake and alert, in no acute distress. Standing up in room. Walking around in his boxer briefs  HEENT: Normocephalic, atraumatic.  No external oral lesions  Eyes:  No conjunctival injection. No icterus.  HEART: RRR per heart monitor   LUNGS: non-labored breathing on room air. No use of accessory muscles.  EXT:  No cyanosis, clubbing or edema. No cord.  :   Without Armando catheter.  SKIN: no rashes noted over exposed skin. No jaundice  NEURO: Oriented to PPT. Normal speech and cognition  PSYCHIATRIC: Normal insight and judgement. Cooperative with PE    Laboratory Data    Results from last 7 days   Lab Units 20  0505 20  0525 20  0002   WBC 10*3/mm3 13.18* 8.66 8.44   HEMOGLOBIN g/dL 13.5 13.1 14.1   HEMATOCRIT % 40.1 39.2 43.4   PLATELETS 10*3/mm3 218 156 165     Results from last 7 days   Lab Units 20  0705   SODIUM mmol/L 135*   POTASSIUM mmol/L 4.7   CHLORIDE mmol/L 98   CO2 mmol/L 18.0*   BUN mg/dL 30*   CREATININE mg/dL 1.22   GLUCOSE mg/dL 106*   CALCIUM mg/dL 9.6     Results from last 7 days   Lab Units 20  0705   ALK PHOS U/L 57   BILIRUBIN mg/dL 0.6   BILIRUBIN DIRECT mg/dL <0.2   ALT (SGPT) U/L 40   AST (SGOT)  U/L 29     Results from last 7 days   Lab Units 20  0505   SED RATE mm/hr 71*     Results from last 7 days   Lab Units 20  0505   CRP mg/dL 14.77*     Results from last 7 days   Lab Units 20  0505   LACTATE mmol/L 1.4     Results from last 7 days   Lab Units 20  0705 20  0505   CK TOTAL U/L 135 475*         Estimated Creatinine Clearance: 94.1 mL/min (by C-G formula based on SCr of 1.22 mg/dL).      Microbiology:  -respiratory panel PCR positive COVID-19      Radiology:  Imaging Results (Last 72 Hours)     ** No results found for the last 72 hours. **          Impression:   1. Sepsis presenting with fever, elevated inflammation markers and bilateral multifocal pneumonia  2. COVID-19 pneumonia- some concern given fever, mild hypoxia, and significant elevated CRP. At risk for further decline  3. Acute hypoxic respiratory failure- improved today. Stable on room air  4. Acute kidney injury secondary to dehydration from COVID-19 infection  5. Dehydration secondary to diarrhea  6. Diarrhea  7. Hypertension  8. GERD  9. Obstructive sleep apnea-uses CPAP at night  10. Left ear fullness-could be some fluid and is his ear due to viral infection.  Has no pain and seems less likely to be in ear infection at this time.  Unlikely due to Remdesivir as his renal function has been ok with a creatinine clearance well above 30.    PLAN/RECOMMENDATIONS:   Thank you for asking us to see Jono Mcintyre, I recommend the followin. Continue to follow cultures   2. Continue to monitor daily CBC, CMP, CRP, and ferritin. Will need to closely monitor renal and hepatic function while on Remdesivir  3. Baseline IL-6 level- was significantly elevated to 64.9  4. Decardon 6 mg PO Daily x 10 days  5. Remdesivir 200 mg IV x1, then 100 mg IV daily x 4 additional days for a total 5 day course  6. Continue to hold antibiotics for now  7. Continue supportive care with O2 therapy prn. Currently off O2  8. Continue  IVFs per hospitalist service given his TY in the setting of diarrhea (diarrhea likely secondary to COVID-19 infection)  9. Continue anticoagulation therapy with Lovenox as patient is at risk for DVT/PE with COVID-19 infection    I discussed with Dr. Chowdhury today    Will continue to monitor inpatient on Remdesivir. He seem to be improving on this medication. Given his mild hypoxia at the start of his admission, could be useful to complete a 5 days course inpatient in order to prevent clinical decline and readmission.    Kevyn Norton MD  9/16/2020  22:00 EDT

## 2020-09-17 NOTE — PLAN OF CARE
Problem: Adult Inpatient Plan of Care  Goal: Plan of Care Review  Recent Flowsheet Documentation  Taken 9/17/2020 0331 by Janna Mcdonald, HAY  Progress: improving  Plan of Care Reviewed With: patient  Outcome Summary: TMax 98.1 this shift. VSS. Pt remains in NSR and remains on RA. Pt pleasant and cooperative this shift. RN called into room via patient's room phone multiple times to check on patient. Pt continues to c/o cough, otherwise no complaints. Continue current POC.

## 2020-09-17 NOTE — PROGRESS NOTES
INFECTIOUS DISEASE CONSULT/INITIAL HOSPITAL VISIT    Jono Mcintyre  1975  [id number]    Date of Consult: 9/13/20  Admission Date: 9/12/2020      Requesting Provider: FRANKLYN Cardenas  Evaluating Physician: Dr. Kevyn Norton    Reason for Consultation: COVID-19 pneumonia    Chief complaint: COVID-19 pneumonia and shortness of breath at night.    History of present illness:    Mr. Jono Mcintyre is a 44 y.o. male who is being evaluated for COVID-19 pneumonia.  He has a past medical history significant for hyperlipidemia, hypertension and obstructive sleep apnea on BiPAP at night.  He states that he began to feel short of breath and began running a fever up to 102 that began Tuesday 9/7.  He states that he was tested for COVID-19 at [facility] pharmacy on Thursday 9/10 and he was informed of the positive results on 9/11.  He continued to have shortness of breath, excessive diarrhea and fever and states that he was called by the health department on Saturday 9/12 and it was recommended by the HD that he presented to the ED for evaluation secondary to his symptoms.    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  He states that his wife has felt ill over the past week but is currently feeling better.  He has 2 children who have been asymptomatic with no signs and symptoms of illness.    Since arrival he has had T-max of 100.9 and O2 sats have been 93-95% on room air except he has not been sleeping well and SPO2 may be dropping at night.  Initial labs show he is without leukocytosis but he is neutrophilic with an ANC of 7.07.  D-dimer, LDH, CRP, lactate, ferritin and pro calcitonin are all elevated.  Creatinine is currently elevated at 1.36.  Repeat COVID-19 testing on 9/13 has returned positive.  CT scan showed bilateral multifocal pneumonia.  GI panel PCR is in progress.    He has no known antibiotic allergies and is currently receiving Decadron therapy.      Subjective:      9/14/20: febrile overnight up to 102 F. No shortness of breath or cough. No chest pain. Ambulating O2 sats about 95-96% today. Patient not interested in getting Remdesivir or plasma. Would like to stay in the hospital longer as he doesn't think that he can be a dad right now as he doesn't feel well. His wife is also sick at home.     9/15/20:  Feeling a lot better and he thinks that the Remdesivir is helping. Diarrhea is starting to slow down. Fatigue is improving. His hypoxia is improving. No fevers. No productive cough    9/16/20:  The patient complains of some fullness that is mainly in his left ear but no ear pain.  This is improved this afternoon.  No fevers.  No shortness of breath or productive cough.  Diarrhea is improving.  Fatigue is improving.  No new rashes.    9/17/20:  The patient is doing better. Ear issues have improved. No nausea. Diarrhea has improved. Fatigue is improving. Sleeping somewhat better at night. Fevers have improved. Tolerating the Remdesivir well without ADRs.     Past Medical History:   Diagnosis Date   • Hyperlipidemia    • Hypertension    • Sleep apnea        History reviewed. No pertinent surgical history.    Family History   Problem Relation Age of Onset   • Hypertension Father    • Hypertension Other    • Heart disease Other    • Stroke Other    • Diabetes Other    • Alzheimer's disease Other        Social History     Socioeconomic History   • Marital status:      Spouse name: Not on file   • Number of children: Not on file   • Years of education: Not on file   • Highest education level: Not on file   Tobacco Use   • Smoking status: Never Smoker   • Smokeless tobacco: Never Used   Substance and Sexual Activity   • Alcohol use: Yes   • Drug use: Never   • Sexual activity: Yes     Partners: Female     Birth control/protection: None     Comment: spouse       Allergies   Allergen Reactions   • No Known Drug Allergy          Medication:    Current Facility-Administered  Medications:   •  acetaminophen (TYLENOL) tablet 650 mg, 650 mg, Oral, Q4H PRN, 650 mg at 09/15/20 1207 **OR** acetaminophen (TYLENOL) 160 MG/5ML solution 650 mg, 650 mg, Oral, Q4H PRN **OR** acetaminophen (TYLENOL) suppository 650 mg, 650 mg, Rectal, Q4H PRN, Tristan, Karlee, APRN  •  benzocaine-menthol (CEPACOL) lozenge 1 lozenge, 1 lozenge, Mouth/Throat, Q2H PRN, Kash Smith PA-C, 1 lozenge at 09/16/20 2011  •  benzonatate (TESSALON) capsule 200 mg, 200 mg, Oral, TID PRN, Tristan, Karlee, APRN, 200 mg at 09/16/20 2013  •  dexamethasone (DECADRON) tablet 6 mg, 6 mg, Oral, Daily With Breakfast, Ara eFrguson MD, 6 mg at 09/17/20 0802  •  enoxaparin (LOVENOX) syringe 40 mg, 40 mg, Subcutaneous, Q24H, Ara Ferguson MD, 40 mg at 09/17/20 0802  •  famotidine (PEPCID) tablet 20 mg, 20 mg, Oral, BID AC, Lele Garrison, APRN, 20 mg at 09/17/20 0802  •  guaifenesin-dextromethorphan (MUCINEX DM) tablet 1 tablet, 1 tablet, Oral, BID PRN, Ara Ferguson MD, 1 tablet at 09/16/20 2010  •  [COMPLETED] INV GS-5734 remdesivir 200 mg in sodium chloride 0.9 % 250 mL IVPB (powder vial), 200 mg, Intravenous, Q24H, 200 mg at 09/14/20 1645 **FOLLOWED BY** INV GS-5734 remdesivir 100 mg in sodium chloride 0.9 % 250 mL IVPB (powder vial), 100 mg, Intravenous, Q24H, Kelly Chowdhury MD  •  Pharmacy Consult - Remdesivir, , Does not apply, Continuous PRN, Kevyn Norton MD  •  QUEtiapine (SEROquel) tablet 25 mg, 25 mg, Oral, Nightly PRN, Kelly Chowdhury MD, 25 mg at 09/16/20 2013  •  sodium chloride 0.9 % flush 10 mL, 10 mL, Intravenous, Q12H, Karlee Hudson APRN, 10 mL at 09/16/20 1944  •  sodium chloride 0.9 % flush 10 mL, 10 mL, Intravenous, PRN, Karlee Hudson APRN  •  vitamin C (ASCORBIC ACID) tablet 1,000 mg, 1,000 mg, Oral, 4x Daily, Lele Garrison APRN, 1,000 mg at 09/17/20 0802    Antibiotics:  Anti-Infectives (From admission, onward)    Ordered     Dose/Rate Route Frequency Start Stop     20 0911  INV GS-5734 remdesivir 100 mg in sodium chloride 0.9 % 250 mL IVPB (powder vial)     Ordering Provider: Kelly Chowdhury MD    100 mg  over 30 Minutes Intravenous Every 24 Hours 20 1200 20 1159    20 1549  INV GS-5734 remdesivir 200 mg in sodium chloride 0.9 % 250 mL IVPB (powder vial)     Ordering Provider: Florence Lipscomb PharmD    200 mg  over 60 Minutes Intravenous Every 24 Hours 20 1600 20 1745            Review of Systems:  As above    Physical Exam:   Vital Signs  Temp (24hrs), Av.8 °F (36.6 °C), Min:97.2 °F (36.2 °C), Max:98.1 °F (36.7 °C)    Temp  Min: 97.2 °F (36.2 °C)  Max: 98.1 °F (36.7 °C)  BP  Min: 120/72  Max: 143/87  Pulse  Min: 55  Max: 80  Resp  Min: 17  Max: 20  SpO2  Min: 94 %  Max: 97 %    Due to the current COVID-19 Pandemic and critical shortage of PPE, the patient was interviewed over the phone and exam was conducted through direct visualization of the patient through his window.    GENERAL: Awake and alert, in no acute distress. Sitting on bedside  HEENT: Normocephalic, atraumatic.  No external oral lesions  Eyes:  No conjunctival injection. No icterus.  HEART: RRR per heart monitor   LUNGS: non-labored breathing on room air. No use of accessory muscles.  :   Without Armando catheter.  SKIN: no rashes noted over exposed skin. No jaundice  NEURO: Oriented to PPT. Normal speech and cognition  PSYCHIATRIC: Normal insight and judgement. Cooperative with PE    Laboratory Data    Results from last 7 days   Lab Units 20  0505 20  0525 20  0002   WBC 10*3/mm3 13.18* 8.66 8.44   HEMOGLOBIN g/dL 13.5 13.1 14.1   HEMATOCRIT % 40.1 39.2 43.4   PLATELETS 10*3/mm3 218 156 165     Results from last 7 days   Lab Units 20  0626   SODIUM mmol/L 137   POTASSIUM mmol/L 4.4   CHLORIDE mmol/L 103   CO2 mmol/L 24.0   BUN mg/dL 27*   CREATININE mg/dL 1.12   GLUCOSE mg/dL 118*   CALCIUM mg/dL 9.2     Results from last 7 days   Lab Units  20  0626 20  0705   ALK PHOS U/L 52 57   BILIRUBIN mg/dL 0.5 0.6   BILIRUBIN DIRECT mg/dL  --  <0.2   ALT (SGPT) U/L 37 40   AST (SGOT) U/L 22 29     Results from last 7 days   Lab Units 20  0505   SED RATE mm/hr 71*     Results from last 7 days   Lab Units 20  0505   CRP mg/dL 14.77*     Results from last 7 days   Lab Units 20  0505   LACTATE mmol/L 1.4     Results from last 7 days   Lab Units 20  0626 20  0705 20  0505   CK TOTAL U/L 89 135 475*         Estimated Creatinine Clearance: 102.5 mL/min (by C-G formula based on SCr of 1.12 mg/dL).      Microbiology:  -respiratory panel PCR positive COVID-19      Radiology:  Imaging Results (Last 72 Hours)     ** No results found for the last 72 hours. **          Impression:   1. Sepsis presenting with fever, elevated inflammation markers and bilateral multifocal pneumonia  2. COVID-19 pneumonia- some concern given fever, mild hypoxia, and significant elevated CRP. At risk for further decline  3. Acute hypoxic respiratory failure- improved today. Stable on room air  4. Acute kidney injury secondary to dehydration from COVID-19 infection  5. Dehydration secondary to diarrhea  6. Diarrhea  7. Hypertension  8. GERD  9. Obstructive sleep apnea-uses CPAP at night  10. Left ear fullness-could be some fluid and is his ear due to viral infection.  Has no pain and seems less likely to be in ear infection at this time.  Unlikely due to Remdesivir as his renal function has been ok with a creatinine clearance well above 30.    PLAN/RECOMMENDATIONS:   Thank you for asking us to see Jono Mcintyre, I recommend the followin. Continue to follow cultures   2. Continue to monitor daily CBC, CMP, CRP, and ferritin. Will need to closely monitor renal and hepatic function while on Remdesivir  3. Baseline IL-6 level- was significantly elevated to 64.9  4. Decardon 6 mg PO Daily x 10 days  5. Remdesivir 200 mg IV x1, then 100 mg IV  daily x 4 additional days for a total 5 day course. His course with end after dose tomorrow.   6. Continue to hold antibiotics for now  7. Continue supportive care with O2 therapy prn. Currently off O2  8. Continue IVFs per hospitalist service given his TY in the setting of diarrhea (diarrhea likely secondary to COVID-19 infection)  9. Continue anticoagulation therapy with Lovenox as patient is at risk for DVT/PE with COVID-19 infection      Will continue to monitor inpatient on Remdesivir. He seem to be improving on this medication. Given his mild hypoxia at the start of his admission, could be useful to complete a 5 days course inpatient in order to prevent clinical decline and readmission.    UM/LIZZETTE:  If he continues to do well then I would be ok with discharge tomorrow after his dose of Remdesivir. Ok to give dose a bit earlier. Plan to complete 10 day couse of decadron 6 mg PO Daily. Needs pulse oximeter at time of discharge. I am happy to follow him up in my clinic via telemedicine on Tuesday, 9/22/20.    Kevyn Norton MD  9/17/2020  11:33 EDT

## 2020-09-18 ENCOUNTER — READMISSION MANAGEMENT (OUTPATIENT)
Dept: CALL CENTER | Facility: HOSPITAL | Age: 45
End: 2020-09-18

## 2020-09-18 VITALS
SYSTOLIC BLOOD PRESSURE: 133 MMHG | RESPIRATION RATE: 16 BRPM | WEIGHT: 236.9 LBS | OXYGEN SATURATION: 96 % | TEMPERATURE: 97.1 F | HEIGHT: 70 IN | HEART RATE: 67 BPM | DIASTOLIC BLOOD PRESSURE: 90 MMHG | BODY MASS INDEX: 33.92 KG/M2

## 2020-09-18 LAB
CK SERPL-CCNC: 66 U/L (ref 20–200)
FERRITIN SERPL-MCNC: 1274 NG/ML (ref 30–400)
LDH SERPL-CCNC: 242 U/L (ref 135–225)

## 2020-09-18 PROCEDURE — 82550 ASSAY OF CK (CPK): CPT | Performed by: INTERNAL MEDICINE

## 2020-09-18 PROCEDURE — 63710000001 DEXAMETHASONE PER 0.25 MG: Performed by: INTERNAL MEDICINE

## 2020-09-18 PROCEDURE — 99239 HOSP IP/OBS DSCHRG MGMT >30: CPT | Performed by: FAMILY MEDICINE

## 2020-09-18 PROCEDURE — 83615 LACTATE (LD) (LDH) ENZYME: CPT | Performed by: INTERNAL MEDICINE

## 2020-09-18 PROCEDURE — 82728 ASSAY OF FERRITIN: CPT | Performed by: INTERNAL MEDICINE

## 2020-09-18 PROCEDURE — 25010000002 ENOXAPARIN PER 10 MG: Performed by: INTERNAL MEDICINE

## 2020-09-18 RX ORDER — BENZOCAINE AND MENTHOL 15; 3.6 MG/1; MG/1
1 LOZENGE ORAL
Qty: 32 EACH | Refills: 0 | Status: SHIPPED | OUTPATIENT
Start: 2020-09-18 | End: 2020-09-25

## 2020-09-18 RX ORDER — DEXAMETHASONE 2 MG/1
6 TABLET ORAL
Qty: 15 TABLET | Refills: 0 | Status: SHIPPED | OUTPATIENT
Start: 2020-09-19 | End: 2020-09-24

## 2020-09-18 RX ORDER — BENZONATATE 200 MG/1
200 CAPSULE ORAL 3 TIMES DAILY PRN
Qty: 15 CAPSULE | Refills: 0 | Status: SHIPPED | OUTPATIENT
Start: 2020-09-18 | End: 2020-09-23

## 2020-09-18 RX ADMIN — DEXAMETHASONE 6 MG: 4 TABLET ORAL at 08:09

## 2020-09-18 RX ADMIN — BENZONATATE 200 MG: 100 CAPSULE ORAL at 08:08

## 2020-09-18 RX ADMIN — FAMOTIDINE 20 MG: 20 TABLET, FILM COATED ORAL at 06:47

## 2020-09-18 RX ADMIN — OXYCODONE HYDROCHLORIDE AND ACETAMINOPHEN 1000 MG: 500 TABLET ORAL at 12:27

## 2020-09-18 RX ADMIN — ENOXAPARIN SODIUM 40 MG: 40 INJECTION SUBCUTANEOUS at 08:10

## 2020-09-18 RX ADMIN — REMDESIVIR 100 MG: 100 INJECTION, POWDER, LYOPHILIZED, FOR SOLUTION INTRAVENOUS at 12:27

## 2020-09-18 RX ADMIN — SODIUM CHLORIDE, PRESERVATIVE FREE 10 ML: 5 INJECTION INTRAVENOUS at 08:08

## 2020-09-18 RX ADMIN — GUAIFENESIN AND DEXTROMETHORPHAN HYDROBROMIDE 1 TABLET: 600; 30 TABLET, EXTENDED RELEASE ORAL at 08:09

## 2020-09-18 RX ADMIN — OXYCODONE HYDROCHLORIDE AND ACETAMINOPHEN 1000 MG: 500 TABLET ORAL at 08:09

## 2020-09-18 NOTE — PROGRESS NOTES
Case Management Discharge Note      Final Note: Patient is being discharged home today. He is on room air and has order placed for [facility] current health home monitoring. No needs for DME and he will return home with family upon discharge - Ceci [phone]         Selected Continued Care - Admitted Since 9/12/2020     Destination    No services have been selected for the patient.              Durable Medical Equipment    No services have been selected for the patient.              Dialysis/Infusion    No services have been selected for the patient.              Home Medical Care Coordination complete    Service Provider Selected Services Address Phone Fax    [facility]  [facility] [address] [address] [phone]          Therapy    No services have been selected for the patient.              Community Resources    No services have been selected for the patient.                       Final Discharge Disposition Code: 06 - home with home health care

## 2020-09-18 NOTE — PLAN OF CARE
Goal Outcome Evaluation:  Plan of Care Reviewed With: patient  Progress: improving  Outcome Summary: Pt rested this shift, no complaints, afebrile, VSS, NSR/sinus reji when resting. Anticipating D/C today, will cont with plan of care.

## 2020-09-18 NOTE — DISCHARGE INSTR - ACTIVITY
Practice COVID-19 isolation precautions and stay at home for the next 10 days.  Limit exposures given you could still be contagious.  Wash your hands often and wear a mask when you do go back out in public after the 10-day isolation.

## 2020-09-18 NOTE — DISCHARGE SUMMARY
[facility]  DISCHARGE SUMMARY    Patient Name: Jono Mcintyre  : 1975  MRN: [id number]    Date of Admission: 2020 11:30 PM  Date of Discharge:  20    Primary Care Physician: Freddie Zarco MD    Consults     Date and Time Order Name Status Description    2020 0451 Inpatient Infectious Diseases Consult Completed           Hospital Course     Presenting Problem:   COVID-19 virus infection [U07.1]  COVID-19 virus infection [U07.1]    Active Hospital Problems    Diagnosis  POA   • **Sepsis (CMS/HCC) [A41.9]  No   • COVID-19 virus infection [U07.1]  Yes   • TY (acute kidney injury) (CMS/HCC) [N17.9]  Yes   • HTN (hypertension) [I10]  Yes   • GERD without esophagitis [K21.9]  Yes      Resolved Hospital Problems   No resolved problems to display.          Hospital Course:  Jono Mcintyre is a 44 y.o. male with history of hypertension, GERD, JOANA on BiPAP, presents with sepsis and symptoms of diarrhea, dyspnea on exertion, dry cough, fever admitted with COVID-19 pneumonia.     Plan  Sepsis with COVID-19 pneumonia  -Clinically sepsis is resolved  -respiratory panel PCR with COVID-19 is positive here (was also positive at [facility] prior to admission)  -CTA chest shows classic peripheral, bilateral GGO consistent with bilateral multifocal pneumonia from COVID-19  -ID was consulted completed Remdesivir which was started , continue Decadron, Lovenox twice daily.  - date of completion of remdesivir is 2020  -I encouraged the patient to adhere to strict isolation guidelines and remain at home for the next 5 days.      Diarrhea  -Resolved     Hyponatremia  -Resolved     Acute kidney injury   - baseline Cr unknown  -Creatinine peaked to 1.39,  improved to 1.19     JOANA-continue home CPAP.     Hypertension  -BP initially low, was treated with fluids and held home ACEi .  -Continue to monitor blood pressure, resume home ACE inhibitor at discharge  -I  advised the patient to get a blood pressure monitor and record his blood pressures daily and review this with his PCP via telemedicine visit next week.         Discharge Follow Up Recommendations for outpatient labs/diagnostics:  Follow-up with PCP next week via telemedicine visit  Follow-up with Kevyn Ying via telemedicine visit next week    Day of Discharge     HPI:   Patient is a 44-year-old who presented with sepsis (criteria leukocytosis, fever) and symptoms of profuse diarrhea, abdominal pain and was found to have COVID 19.  He also has a cough and mild shortness of breath.  His CT scan revealed bilateral multifocal pneumonia.     9/16/2020-patient reports his diarrhea is almost resolved.  Overall he feels much better.  He would like to take a shower today.  He is drinking lots of water so fluids have been discontinued.  He is eating regular diet as well.  No nausea or vomiting.  Mild cough but getting better.  No fever overnight.     9/17/2020-patient is doing better today and has less complaints.  He is tolerating p.o. and ambulating.  He denies significant shortness of breath.  He still has an occasional cough.  No fever overnight.    9/18/2020-patient feels much better and is ready to go home today.  No more diarrhea.  Mild cough that is improved with throat lozenges.  He will complete course of steroids at home and he completed his remdesivir today.  Tolerating regular diet.  He mentioned going out going to the store and I strongly encouraged him to stay at home and isolation per COVID-19 protocols at least 10 days total.     Review of Systems  General: Positive fever on 9/15/2020, denies chills, positive fatigue  ENT: No sore throat, trouble swallowing or changes in vision  Respiratory: Mild shortness of breath, mild cough, denies wheezing or fast breathing  Cardiovascular: No chest pain, palpitations, dyspnea with exertion  Gastrointestinal: No nausea vomiting abdominal pain, diarrhea  resolved  Musculoskeletal: No difficulty walking, no weakness  Vascular: No cyanosis or clubbing  Lymphatic: No peripheral edema, no lymphadenopathy  Neurologic: No headache, confusion, dizziness  Psychiatric: No changes in mood.  No depression or anxiety.         Vital Signs:   Temp:  [93.6 °F (34.2 °C)-98.6 °F (37 °C)] 97.9 °F (36.6 °C)  Heart Rate:  [53-72] 72  Resp:  [16-18] 16  BP: (115-133)/(45-80) 133/79     Physical Exam:  Constitutional: No acute distress, awake, alert, nontoxic, normal body habitus  HENT: NCAT  Respiratory: good effort, nonlabored respirations, uses CPAP at night, O2 sat 97% on room air  Cardiovascular: tele with NSR  Musculoskeletal: No edema, normal muscle tone and mass for age  GI: Soft, no grimace, nondistended  Psychiatric: Appropriate affect, good insight and judgement, cooperative  Neurologic: Oriented x 3, movements symmetric BUE and BLE, speech clear and fluent  Skin: No visible rashes, no jaundice seen     Pertinent  and/or Most Recent Results     Results from last 7 days   Lab Units 09/17/20  0626 09/16/20  0705 09/14/20  0505 09/13/20  0525 09/13/20  0002   WBC 10*3/mm3  --   --  13.18* 8.66 8.44   HEMOGLOBIN g/dL  --   --  13.5 13.1 14.1   HEMATOCRIT %  --   --  40.1 39.2 43.4   PLATELETS 10*3/mm3  --   --  218 156 165   SODIUM mmol/L 137 135* 131* 131* 132*   POTASSIUM mmol/L 4.4 4.7 3.9 3.8 4.0   CHLORIDE mmol/L 103 98 97* 98 93*   CO2 mmol/L 24.0 18.0* 19.0* 20.0* 22.0   BUN mg/dL 27* 30* 19 19 19   CREATININE mg/dL 1.12 1.22 1.19 1.36* 1.39*   GLUCOSE mg/dL 118* 106* 163* 159* 164*   CALCIUM mg/dL 9.2 9.6 9.1 9.2 9.5     Results from last 7 days   Lab Units 09/17/20  0626 09/16/20  0705 09/15/20  0728 09/14/20  0505 09/13/20  0525 09/13/20  0002   BILIRUBIN mg/dL 0.5 0.6 0.6 0.7 0.9 1.0   ALK PHOS U/L 52 57 57 59 61 65   ALT (SGPT) U/L 37 40 41 37 36 39   AST (SGOT) U/L 22 29 51* 42* 48* 47*           Invalid input(s): TG, LDLCALC, LDLREALC  Results from last 7 days   Lab  Units 09/14/20  0505 09/13/20  0525 09/13/20  0002   PROCALCITONIN ng/mL 0.64*  --  1.11*   LACTATE mmol/L 1.4 1.0 2.1*       Brief Urine Lab Results  (Last result in the past 365 days)      Color   Clarity   Blood   Leuk Est   Nitrite   Protein   CREAT   Urine HCG        09/14/20 0932             25.0             Microbiology Results Abnormal     Procedure Component Value - Date/Time    Gastrointestinal Panel, PCR - Stool, Per Rectum [[id number]]  (Normal) Collected: 09/13/20 0842    Lab Status: Final result Specimen: Stool from Per Rectum Updated: 09/13/20 1116     Campylobacter Not Detected     Plesiomonas shigelloides Not Detected     Salmonella Not Detected     Vibrio Not Detected     Vibrio cholerae Not Detected     Yersinia enterocolitica Not Detected     Enteroaggregative E. coli (EAEC) Not Detected     Enteropathogenic E. coli (EPEC) Not Detected     Enterotoxigenic E. coli (ETEC) lt/st Not Detected     Shiga-like toxin-producing E. coli (STEC) stx1/stx2 Not Detected     E. coli O157 Not Detected     Shigella/Enteroinvasive E. coli (EIEC) Not Detected     Cryptosporidium Not Detected     Cyclospora cayetanensis Not Detected     Entamoeba histolytica Not Detected     Giardia lamblia Not Detected     Adenovirus F40/41 Not Detected     Astrovirus Not Detected     Norovirus GI/GII Not Detected     Rotavirus A Not Detected     Sapovirus (I, II, IV or V) Not Detected    Respiratory Panel PCR w/COVID-19(SARS-CoV-2) SUMAN/ISSA/KRIS/PAD/COR/MAD In-House, NP Swab in UTM/VTM, 3-4 HR TAT - Swab, Nasopharynx [[id number]]  (Abnormal) Collected: 09/13/20 0312    Lab Status: Final result Specimen: Swab from Nasopharynx Updated: 09/13/20 0529     ADENOVIRUS, PCR Not Detected     Coronavirus 229E Not Detected     Coronavirus HKU1 Not Detected     Coronavirus NL63 Not Detected     Coronavirus OC43 Not Detected     COVID19 Detected     Human Metapneumovirus Not Detected     Human Rhinovirus/Enterovirus Not Detected     Influenza A  PCR Not Detected     Influenza A H1 Not Detected     Influenza A H1 2009 PCR Not Detected     Influenza A H3 Not Detected     Influenza B PCR Not Detected     Parainfluenza Virus 1 Not Detected     Parainfluenza Virus 2 Not Detected     Parainfluenza Virus 3 Not Detected     Parainfluenza Virus 4 Not Detected     RSV, PCR Not Detected     Bordetella pertussis pcr Not Detected     Bordetella parapertussis PCR Not Detected     Chlamydophila pneumoniae PCR Not Detected     Mycoplasma pneumo by PCR Not Detected    Narrative:      Fact sheet for providers: https://docs.[facility]/wp-content/uploads/WCX3727-0732-AD2.1-EUA-Provider-Fact-Sheet-3.pdf    Fact sheet for patients: https://docs.[facility]/wp-content/uploads/NLG1881-7309-JL2.1-EUA-Patient-Fact-Sheet-1.pdf          Imaging Results (All)     Procedure Component Value Units Date/Time    CT Chest Pulmonary Embolism [[id number]] Collected: 09/13/20 0216     Updated: 09/13/20 0218    Narrative:      CT CHEST PULMONARY EMBOLISM W CONTRAST    INDICATION:   Pneumonia with fever    TECHNIQUE:   CT angiogram of the chest with 100 cc of Isovue-300 IV contrast. 3-D reconstructions were obtained and reviewed.   Radiation dose reduction techniques included automated exposure control or exposure modulation based on body size. Count of known CT and  cardiac nuc med studies performed in previous 12 months: 0.     COMPARISON:   None available.    FINDINGS:   Chest images at mediastinal window show no adenopathy or effusions. There are no pulmonary artery filling defects to suggest emboli. Hepatic steatosis is noted. The CT angiographic images also show no evidence of emboli.    Chest images at lung window show multifocal groundglass infiltrates more prominent in the mid to lower lung fields and peripherally. Commonly reported imaging features of COVID-19 pneumonia are present. Other processes such as influenza pneumonia and  organizing pneumonia can cause a similar imaging  pattern.      Impression:      Bilateral multifocal pneumonia. No evidence of pulmonary embolism. No evidence of pleural or pericardial fluid. Hepatic steatosis is also seen.    Signer Name: Pablo Guerra MD   Signed: 9/13/2020 2:16 AM   Workstation Name: [id number]    [facility]                         Plan for Follow-up of Pending Labs/Results: No pending labs at the time of discharge    Discharge Details        Discharge Medications      New Medications      Instructions Start Date   benzocaine-menthol 15-3.6 MG lozenge lozenge  Commonly known as: CEPACOL   1 lozenge, Mouth/Throat, Every 2 Hours PRN      benzonatate 200 MG capsule  Commonly known as: TESSALON   200 mg, Oral, 3 Times Daily PRN      dexamethasone 6 MG tablet  Commonly known as: DECADRON   6 mg, Oral, Daily With Breakfast   Start Date: September 19, 2020        Continue These Medications      Instructions Start Date   allopurinol 100 MG tablet  Commonly known as: ZYLOPRIM   No dose, route, or frequency recorded.      benazepril 20 MG tablet  Commonly known as: LOTENSIN   20 mg, Oral, Daily      Flonase Allergy Relief 50 MCG/ACT nasal spray  Generic drug: fluticasone   Flonase Allergy Relief 50 mcg/actuation nasal spray,suspension   Spray 1 spray every day by intranasal route as needed for 7 days.      raNITIdine 150 MG tablet  Commonly known as: ZANTAC   No dose, route, or frequency recorded.      ZyrTEC Allergy 10 MG tablet  Generic drug: cetirizine   Zyrtec 10 mg tablet   Take 1 tablet every day by oral route.             Allergies   Allergen Reactions   • No Known Drug Allergy          Discharge Disposition:  Home or Self Care    Diet:  Hospital:  Diet Order   Procedures   • Diet Regular       Activity:  Activity Instructions     Activity as Tolerated            Restrictions or Other Recommendations:  Practice COVID-19 isolation precautions and stay at home for the next 10 days.  Limit exposures given you could still be  contagious.  Wash your hands often and wear a mask when you do go back out in public after the 10-day isolation.       CODE STATUS:    Code Status and Medical Interventions:   Ordered at: 09/13/20 0314     Level Of Support Discussed With:    Patient     Code Status:    CPR     Medical Interventions (Level of Support Prior to Arrest):    Full       Future Appointments   Date Time Provider Department Center   1/7/2021  9:15 AM Eddie Hi MD [facility]       Additional Instructions for the Follow-ups that You Need to Schedule     Discharge Follow-up with PCP   As directed       Currently Documented PCP:    Freddie Zarco MD    PCP Phone Number:    [phone]     Follow Up Details: next week for review of BP readings (telemedicine visit)                   Electronically signed by Kelly Chowdhury MD, 09/18/20, 12:21 PM EDT.        Time Spent on Discharge:  I spent  35  minutes on this discharge activity which included: face-to-face encounter with the patient, reviewing the data in the system, coordination of the care with the nursing staff as well as consultants, documentation, and entering orders.

## 2020-09-18 NOTE — PROGRESS NOTES
INFECTIOUS DISEASE CONSULT/INITIAL HOSPITAL VISIT    Jono Mcintyre  1975  [id number]    Date of Consult: 9/13/20  Admission Date: 9/12/2020      Requesting Provider: FRANKLYN Cardenas  Evaluating Physician: Dr. Kevyn Norton    Reason for Consultation: COVID-19 pneumonia    Chief complaint: COVID-19 pneumonia and shortness of breath at night.    History of present illness:    Mr. Jono Mcintyre is a 44 y.o. male who is being evaluated for COVID-19 pneumonia.  He has a past medical history significant for hyperlipidemia, hypertension and obstructive sleep apnea on BiPAP at night.  He states that he began to feel short of breath and began running a fever up to 102 that began Tuesday 9/7.  He states that he was tested for COVID-19 at [facility] pharmacy on Thursday 9/10 and he was informed of the positive results on 9/11.  He continued to have shortness of breath, excessive diarrhea and fever and states that he was called by the health department on Saturday 9/12 and it was recommended by the HD that he presented to the ED for evaluation secondary to his symptoms.    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  He states that his wife has felt ill over the past week but is currently feeling better.  He has 2 children who have been asymptomatic with no signs and symptoms of illness.    Since arrival he has had T-max of 100.9 and O2 sats have been 93-95% on room air except he has not been sleeping well and SPO2 may be dropping at night.  Initial labs show he is without leukocytosis but he is neutrophilic with an ANC of 7.07.  D-dimer, LDH, CRP, lactate, ferritin and pro calcitonin are all elevated.  Creatinine is currently elevated at 1.36.  Repeat COVID-19 testing on 9/13 has returned positive.  CT scan showed bilateral multifocal pneumonia.  GI panel PCR is in progress.    He has no known antibiotic allergies and is currently receiving Decadron therapy.      Subjective:      9/14/20: febrile overnight up to 102 F. No shortness of breath or cough. No chest pain. Ambulating O2 sats about 95-96% today. Patient not interested in getting Remdesivir or plasma. Would like to stay in the hospital longer as he doesn't think that he can be a dad right now as he doesn't feel well. His wife is also sick at home.     9/15/20:  Feeling a lot better and he thinks that the Remdesivir is helping. Diarrhea is starting to slow down. Fatigue is improving. His hypoxia is improving. No fevers. No productive cough    9/16/20:  The patient complains of some fullness that is mainly in his left ear but no ear pain.  This is improved this afternoon.  No fevers.  No shortness of breath or productive cough.  Diarrhea is improving.  Fatigue is improving.  No new rashes.    9/17/20:  The patient is doing better. Ear issues have improved. No nausea. Diarrhea has improved. Fatigue is improving. Sleeping somewhat better at night. Fevers have improved. Tolerating the Remdesivir well without ADRs.     9/18/20: no new issues overnight per primary team. Patient was sleeping when I tried calling him this morning. No fevers overnight.    Past Medical History:   Diagnosis Date   • Hyperlipidemia    • Hypertension    • Sleep apnea        History reviewed. No pertinent surgical history.    Family History   Problem Relation Age of Onset   • Hypertension Father    • Hypertension Other    • Heart disease Other    • Stroke Other    • Diabetes Other    • Alzheimer's disease Other        Social History     Socioeconomic History   • Marital status:      Spouse name: Not on file   • Number of children: Not on file   • Years of education: Not on file   • Highest education level: Not on file   Tobacco Use   • Smoking status: Never Smoker   • Smokeless tobacco: Never Used   Substance and Sexual Activity   • Alcohol use: Yes   • Drug use: Never   • Sexual activity: Yes     Partners: Female     Birth control/protection: None      Comment: spouse       Allergies   Allergen Reactions   • No Known Drug Allergy          Medication:    Current Facility-Administered Medications:   •  acetaminophen (TYLENOL) tablet 650 mg, 650 mg, Oral, Q4H PRN, 650 mg at 09/15/20 1207 **OR** acetaminophen (TYLENOL) 160 MG/5ML solution 650 mg, 650 mg, Oral, Q4H PRN **OR** acetaminophen (TYLENOL) suppository 650 mg, 650 mg, Rectal, Q4H PRN, Tristan, Karlee, APRN  •  benzocaine-menthol (CEPACOL) lozenge 1 lozenge, 1 lozenge, Mouth/Throat, Q2H PRN, Kash Smith PA-C, 1 lozenge at 09/17/20 2128  •  benzonatate (TESSALON) capsule 200 mg, 200 mg, Oral, TID PRN, Tristan, Karlee, APRN, 200 mg at 09/18/20 0808  •  dexamethasone (DECADRON) tablet 6 mg, 6 mg, Oral, Daily With Breakfast, Ara Ferguson MD, 6 mg at 09/18/20 0809  •  enoxaparin (LOVENOX) syringe 40 mg, 40 mg, Subcutaneous, Q24H, Ara Ferguson MD, 40 mg at 09/18/20 0810  •  famotidine (PEPCID) tablet 20 mg, 20 mg, Oral, BID AC, Lele Garrison, APRN, 20 mg at 09/18/20 0647  •  guaifenesin-dextromethorphan (MUCINEX DM) tablet 1 tablet, 1 tablet, Oral, BID PRN, Ara Ferguson MD, 1 tablet at 09/18/20 0809  •  Pharmacy Consult - Remdesivir, , Does not apply, Continuous PRN, Kevyn Norton MD  •  QUEtiapine (SEROquel) tablet 25 mg, 25 mg, Oral, Nightly PRN, Kelly Chowdhury MD, 25 mg at 09/17/20 2127  •  sodium chloride 0.9 % flush 10 mL, 10 mL, Intravenous, Q12H, Tristan, Karlee, APRN, 10 mL at 09/18/20 0808  •  sodium chloride 0.9 % flush 10 mL, 10 mL, Intravenous, PRN, Karlee Hudson APRN  •  vitamin C (ASCORBIC ACID) tablet 1,000 mg, 1,000 mg, Oral, 4x Daily, Lele Garrison APRN, 1,000 mg at 09/18/20 1227    Antibiotics:  Anti-Infectives (From admission, onward)    Ordered     Dose/Rate Route Frequency Start Stop    09/17/20 0911  [facility] [id number] remdesivir 100 mg in sodium chloride 0.9 % 250 mL IVPB (powder vial)     Ordering Provider: Kelly Chowdhury MD    100 mg  over 30  Minutes Intravenous Every 24 Hours 20 1200 20 1257    20 1549  [id number] remdesivir 200 mg in sodium chloride 0.9 % 250 mL IVPB (powder vial)     Ordering Provider: Florence Lipscomb PharmD    200 mg  over 60 Minutes Intravenous Every 24 Hours 20 1600 20 2167            Review of Systems:  As above    Physical Exam:   Vital Signs  Temp (24hrs), Av °F (36.1 °C), Min:93.6 °F (34.2 °C), Max:98.6 °F (37 °C)    Temp  Min: 93.6 °F (34.2 °C)  Max: 98.6 °F (37 °C)  BP  Min: 115/72  Max: 133/90  Pulse  Min: 53  Max: 72  Resp  Min: 16  Max: 18  SpO2  Min: 94 %  Max: 99 %    Due to the current COVID-19 Pandemic and critical shortage of PPE, the patient was interviewed over the phone and exam was conducted through direct visualization of the patient through his window.    GENERAL: lying in bed sleeping. NAD  HEENT: Normocephalic, atraumatic.  No external oral lesions  HEART: RRR per heart monitor   LUNGS: non-labored breathing on room air. No use of accessory muscles.  :   Without Armando catheter.  SKIN: no rashes noted over exposed skin. No jaundice  NEURO: sleeping      Laboratory Data    Results from last 7 days   Lab Units 20  0505 20  0525 20  0002   WBC 10*3/mm3 13.18* 8.66 8.44   HEMOGLOBIN g/dL 13.5 13.1 14.1   HEMATOCRIT % 40.1 39.2 43.4   PLATELETS 10*3/mm3 218 156 165     Results from last 7 days   Lab Units 20  0626   SODIUM mmol/L 137   POTASSIUM mmol/L 4.4   CHLORIDE mmol/L 103   CO2 mmol/L 24.0   BUN mg/dL 27*   CREATININE mg/dL 1.12   GLUCOSE mg/dL 118*   CALCIUM mg/dL 9.2     Results from last 7 days   Lab Units 20  0626 20  0705   ALK PHOS U/L 52 57   BILIRUBIN mg/dL 0.5 0.6   BILIRUBIN DIRECT mg/dL  --  <0.2   ALT (SGPT) U/L 37 40   AST (SGOT) U/L 22 29     Results from last 7 days   Lab Units 20  0505   SED RATE mm/hr 71*     Results from last 7 days   Lab Units 20  0505   CRP mg/dL 14.77*     Results from last 7 days   Lab  Units 20  0505   LACTATE mmol/L 1.4     Results from last 7 days   Lab Units 20  1004 20  0626 20  0705   CK TOTAL U/L 66 89 135         Estimated Creatinine Clearance: 101.6 mL/min (by C-G formula based on SCr of 1.12 mg/dL).      Microbiology:  -respiratory panel PCR positive COVID-19      Radiology:  Imaging Results (Last 72 Hours)     ** No results found for the last 72 hours. **          Impression:   1. Sepsis presenting with fever, elevated inflammation markers and bilateral multifocal pneumonia- improved  2. COVID-19 pneumonia- some concern given fever, mild hypoxia, and significant elevated CRP. At risk for further decline  3. Acute hypoxic respiratory failure- improved  4. Acute kidney injury secondary to dehydration from COVID-19 infection  5. Dehydration secondary to diarrhea  6. Diarrhea  7. Hypertension  8. GERD  9. Obstructive sleep apnea-uses CPAP at night  10. Left ear fullness-improved    PLAN/RECOMMENDATIONS:   Thank you for asking us to see Jono Mcintyre, I recommend the followin. Continue to follow cultures   2. Continue to monitor daily CBC, CMP, CRP, and ferritin. Will need to closely monitor renal and hepatic function while on Remdesivir  3. Baseline IL-6 level- was significantly elevated to 64.9  4. Decardon 6 mg PO Daily x 10 days  5. Remdesivir 200 mg IV x1, then 100 mg IV daily x 4 additional days for a total 5 day course. His course with end after dose tomorrow.   6. Continue to hold antibiotics for now  7. Continue supportive care with O2 therapy prn. Currently off O2  8. Continue IVFs per hospitalist service given his TY in the setting of diarrhea (diarrhea likely secondary to COVID-19 infection)  9. Continue anticoagulation therapy with Lovenox as patient is at risk for DVT/PE with COVID-19 infection      I am ok with his discharge home today after his last dose of Remdesivir    UM/LIZZETTE:  Plan to complete 10 day couse of decadron 6 mg PO Daily.  Needs pulse oximeter at time of discharge. I am happy to follow him up in my clinic via telemedicine on Tuesday, 9/22/20.    I discussed with Dr. Chowdhury today    Kevyn Norton MD  9/18/2020  17:42 EDT

## 2020-09-19 ENCOUNTER — READMISSION MANAGEMENT (OUTPATIENT)
Dept: CALL CENTER | Facility: HOSPITAL | Age: 45
End: 2020-09-19

## 2020-09-19 NOTE — OUTREACH NOTE
Prep Survey      Responses   [facility] facility patient discharged from?  [place]   Is LACE score < 7 ?  No   Eligibility  Readm Mgmt   Discharge diagnosis  Covid 19   COVID-19 Test Status  Confirmed   Does the patient have one of the following disease processes/diagnoses(primary or secondary)?  Other   Does the patient have Home health ordered?  Yes   What is the Home health agency?   [facility]   Is there a DME ordered?  No   Prep survey completed?  Yes          Aviva Elder RN

## 2020-09-19 NOTE — OUTREACH NOTE
"Medical Week 1 Survey      Responses   [facility] patient discharged from?  [place]   COVID-19 Test Status  Confirmed   Does the patient have one of the following disease processes/diagnoses(primary or secondary)?  Other   Is there a successful TCM telephone encounter documented?  No   Week 1 attempt successful?  Yes   Rescheduled  Rescheduled-pt requested   Discharge diagnosis  Covid 19   Wrap up additional comments  Pt wife reports \"they've gone out for the day, you'll have to call him and ask.\"          Jordan Mirza RN  "

## 2020-09-20 ENCOUNTER — READMISSION MANAGEMENT (OUTPATIENT)
Dept: CALL CENTER | Facility: HOSPITAL | Age: 45
End: 2020-09-20

## 2020-09-20 NOTE — OUTREACH NOTE
"Medical Week 1 Survey      Responses   [facility], [place] patient discharged from?  [place]   COVID-19 Test Status  Confirmed   Does the patient have one of the following disease processes/diagnoses(primary or secondary)?  Other   Is there a successful TCM telephone encounter documented?  No   Week 1 attempt successful?  Yes   Call start time  1357   Call end time  1403   Discharge diagnosis  Covid 19   Meds reviewed with patient/caregiver?  Yes   Is the patient having any side effects they believe may be caused by any medication additions or changes?  No   Does the patient have all medications ordered at discharge?  Yes   Is the patient taking all medications as directed (includes completed medication regime)?  Yes   Does the patient have a primary care provider?   Yes   Does the patient have an appointment with their PCP within 7 days of discharge?  Greater than 7 days   Comments regarding PCP  pt reports having a telehealth visit \"next week.\"    Nursing Interventions  Educated patient on importance of making appointment, Advised patient to make appointment   Has the patient kept scheduled appointments due by today?  N/A   What is the Home health agency?   [facility]   Has home health visited the patient within 72 hours of discharge?  No   Pulse Ox monitoring  Intermittent   Pulse Ox device source  Patient   O2 Sat comments  pt reports his oxygen levels are running 99%   O2 Sat: education provided  Monitoring frequency, Sat levels, When to seek care   Psychosocial issues?  No   Notified Case Management  Home Health   Comments  Pt reports  has not contacted him. Please follow-up.   Did the patient receive a copy of their discharge instructions?  Yes   Nursing interventions  Reviewed instructions with patient   What is the patient's perception of their health status since discharge?  Improving   Is the patient/caregiver able to teach back signs and symptoms related to disease process for when to call PCP?  Yes   Is the " patient/caregiver able to teach back signs and symptoms related to disease process for when to call 911?  Yes   Is the patient/caregiver able to teach back the hierarchy of who to call/visit for symptoms/problems? PCP, Specialist, Home health nurse, Urgent Care, ED, 911  Yes   Additional teach back comments  Pt reports he is having some soreness in his right calf. I provided education about signs and symptoms for DVT and PE., and discuss hierarchy of care.    Week 1 call completed?  Yes          Jordan Mirza, RN

## 2020-09-21 ENCOUNTER — READMISSION MANAGEMENT (OUTPATIENT)
Dept: CALL CENTER | Facility: HOSPITAL | Age: 45
End: 2020-09-21

## 2020-09-21 NOTE — OUTREACH NOTE
Case Management Call Center Follow-up      Responses   [facility] Call Center Tracking Reason?  Other (specify in comments)   Other Tracking Comments  Does not want Home health any longer   Has the Call Center Case Management Follow-up issue been resolved?  Yes   Follow-up Comments  SW spoke with pt by phone. Pt reports he does not want home health right now because him and his family don't want people coming in and out of the home right now. SW notified [facility] home care that pt no longer wants home health.          ADÁN Dorsey    9/21/2020, 12:20 EDT

## 2020-09-21 NOTE — OUTREACH NOTE
Medical Week 1 Survey      Responses   [facility] patient discharged from?  [place]   COVID-19 Test Status  Confirmed   Does the patient have one of the following disease processes/diagnoses(primary or secondary)?  Other   Is there a successful TCM telephone encounter documented?  No   Week 1 attempt successful?  No          Jie Gomez RN

## 2020-09-24 ENCOUNTER — READMISSION MANAGEMENT (OUTPATIENT)
Dept: CALL CENTER | Facility: HOSPITAL | Age: 45
End: 2020-09-24

## 2020-09-24 NOTE — OUTREACH NOTE
Medical Week 1 Survey      Responses   [facility] patient discharged from?  [place]   COVID-19 Test Status  Confirmed   Does the patient have one of the following disease processes/diagnoses(primary or secondary)?  Other   Is there a successful TCM telephone encounter documented?  No   Week 1 attempt successful?  No   Unsuccessful attempts  Attempt 3          Melia Guzman RN

## 2020-09-25 ENCOUNTER — TELEPHONE (OUTPATIENT)
Dept: NEUROLOGY | Facility: CLINIC | Age: 45
End: 2020-09-25

## 2020-09-25 NOTE — TELEPHONE ENCOUNTER
PATIENT CALLED ABOUT SLEEP MACHINE, HE IS NEEDING HIS LEVEL TO BE SET AT A 13    [facility]    PLEASE CALL [phone]

## 2020-09-27 ENCOUNTER — READMISSION MANAGEMENT (OUTPATIENT)
Dept: CALL CENTER | Facility: HOSPITAL | Age: 45
End: 2020-09-27

## 2020-09-27 NOTE — OUTREACH NOTE
Medical Week 2 Survey      Responses   [facility], [place] patient discharged from?  [place]   COVID-19 Test Status  Confirmed   Does the patient have one of the following disease processes/diagnoses(primary or secondary)?  Other   Week 2 attempt successful?  No          Jordan Mirza RN

## 2020-09-30 ENCOUNTER — READMISSION MANAGEMENT (OUTPATIENT)
Dept: CALL CENTER | Facility: HOSPITAL | Age: 45
End: 2020-09-30

## 2020-09-30 NOTE — OUTREACH NOTE
Medical Week 2 Survey      Responses   [facility] patient discharged from?  [place]   COVID-19 Test Status  Confirmed   Does the patient have one of the following disease processes/diagnoses(primary or secondary)?  Other   Week 2 attempt successful?  Yes   Call start time  1331   Discharge diagnosis  Covid 19   Call end time  1332   Meds reviewed with patient/caregiver?  Yes   Is the patient having any side effects they believe may be caused by any medication additions or changes?  No   Does the patient have all medications ordered at discharge?  Yes   Is the patient taking all medications as directed (includes completed medication regime)?  Yes   Does the patient have a primary care provider?   Yes   Has the patient kept scheduled appointments due by today?  Yes   Pulse Ox monitoring  Intermittent   Pulse Ox device source  Patient   Psychosocial issues?  No   Did the patient receive a copy of their discharge instructions?  Yes   Nursing interventions  Reviewed instructions with patient, Educated on MyChart   What is the patient's perception of their health status since discharge?  Improving   Is the patient/caregiver able to teach back signs and symptoms related to disease process for when to call PCP?  Yes   Is the patient/caregiver able to teach back signs and symptoms related to disease process for when to call 911?  Yes   Is the patient/caregiver able to teach back the hierarchy of who to call/visit for symptoms/problems? PCP, Specialist, Home health nurse, Urgent Care, ED, 911  Yes   Week 2 Call Completed?  Yes   Wrap up additional comments  He has delveloped a blood clot in his leg. His MD is currently treating this.           Melia Guzman RN

## 2020-10-07 ENCOUNTER — READMISSION MANAGEMENT (OUTPATIENT)
Dept: CALL CENTER | Facility: HOSPITAL | Age: 45
End: 2020-10-07

## 2020-10-07 NOTE — OUTREACH NOTE
COVID-19 Week 3 Survey      Responses   [facility] patient discharged from?  [place]   Does the patient have one of the following disease processes/diagnoses(primary or secondary)?  Other   Call Number  Call 1   COVID-19 Week 3: Call 1 attempt successful?  No   Discharge diagnosis  Covid 19          Shala Myers LPN

## 2020-10-14 ENCOUNTER — READMISSION MANAGEMENT (OUTPATIENT)
Dept: CALL CENTER | Facility: HOSPITAL | Age: 45
End: 2020-10-14

## 2020-10-14 NOTE — OUTREACH NOTE
COVID-19 Week 4 Survey      Responses   [facility] patient discharged from?  [place]   Does the patient have one of the following disease processes/diagnoses(primary or secondary)?  Other   Call Number  Call 1   COVID-19 Week 4: Call 1 attempt successful?  No   Discharge diagnosis  Covid 19          Sheree Cervantes, RN

## 2021-01-07 ENCOUNTER — OFFICE VISIT (OUTPATIENT)
Dept: NEUROLOGY | Facility: CLINIC | Age: 46
End: 2021-01-07

## 2021-01-07 VITALS
HEART RATE: 72 BPM | WEIGHT: 257.4 LBS | OXYGEN SATURATION: 98 % | BODY MASS INDEX: 36.85 KG/M2 | TEMPERATURE: 97.3 F | HEIGHT: 70 IN

## 2021-01-07 DIAGNOSIS — G47.33 OBSTRUCTIVE APNEA: Primary | Chronic | ICD-10-CM

## 2021-01-07 PROCEDURE — 99213 OFFICE O/P EST LOW 20 MIN: CPT | Performed by: PSYCHIATRY & NEUROLOGY

## 2021-01-07 RX ORDER — FAMOTIDINE 20 MG/1
TABLET, FILM COATED ORAL EVERY 12 HOURS SCHEDULED
COMMUNITY

## 2021-01-07 RX ORDER — BENZONATATE 100 MG/1
CAPSULE ORAL
COMMUNITY
Start: 2020-10-16

## 2021-01-07 RX ORDER — APIXABAN 5 MG/1
TABLET, FILM COATED ORAL
COMMUNITY
Start: 2021-01-02

## 2021-01-07 NOTE — PROGRESS NOTES
Chief Complaint  No chief complaint on file.    Subjective          Jono Mcintyre presents to [facility] NEUROLOGY for JOANA.    History of Present Illness    45 y.o. male returns in follow up.  Last visit on 1/7/20 renewed CPAP mask and supplies.      Interval history:    Admitted [facility] 9/12/20 - 9/18/20 for Covid, sepsis with PNA, TY.    CT chest shows classic peripheral, bilateral GGO consistent with bilateral multifocal pneumonia from COVID-19    Tx with Remdesivir, decadron, Lovenox    Developed LE DVT and started on Eliquis.     Compliant with CPAP and receiving benefit.      Objective   Vital Signs:   There were no vitals taken for this visit.    Physical Exam     Neurologic Exam    Result Review :   The following data was reviewed by: Eddie Hi MD on 01/07/2021:  Common labs    Common Labsle 9/15/20 9/16/20 9/16/20 9/17/20     0705 0705    BUN   30 (A) 27 (A)   Creatinine   1.22 1.12   eGFR Non African Am   64 71   Sodium   135 (A) 137   Potassium   4.7 4.4   Chloride   98 103   Calcium   9.6 9.2   Albumin 4.00 3.90  3.70   Total Bilirubin 0.6 0.6  0.5   Alkaline Phosphatase 57 57  52   AST (SGOT) 51 (A) 29  22   ALT (SGPT) 41 40  37   (A) Abnormal value       Comments are available for some flowsheets but are not being displayed.           Data reviewed: Radiologic studies CTA chest and Consultant notes Dr Kelly Chowdhury          Assessment and Plan    Problem List Items Addressed This Visit        Other    Obstructive apnea - Primary (Chronic)          Follow Up   No follow-ups on file.  Patient was given instructions and counseling regarding his condition or for health maintenance advice. Please see specific information pulled into the AVS if appropriate.

## 2024-02-06 ENCOUNTER — APPOINTMENT (OUTPATIENT)
Dept: GENERAL RADIOLOGY | Facility: HOSPITAL | Age: 49
End: 2024-02-06
Payer: COMMERCIAL

## 2024-02-06 ENCOUNTER — HOSPITAL ENCOUNTER (INPATIENT)
Facility: HOSPITAL | Age: 49
LOS: 2 days | Discharge: HOME | End: 2024-02-08
Attending: EMERGENCY MEDICINE | Admitting: INTERNAL MEDICINE
Payer: COMMERCIAL

## 2024-02-06 DIAGNOSIS — I21.3 ST ELEVATION MYOCARDIAL INFARCTION (STEMI), UNSPECIFIED ARTERY: Primary | ICD-10-CM

## 2024-02-06 PROBLEM — I21.11 STEMI INVOLVING RIGHT CORONARY ARTERY: Status: ACTIVE | Noted: 2024-02-06

## 2024-02-06 LAB
BASOPHILS # BLD AUTO: 0.05 10*3/MM3 (ref 0–0.2)
BASOPHILS NFR BLD AUTO: 0.6 % (ref 0–1.5)
CATH EF ESTIMATED: 60 %
DEPRECATED RDW RBC AUTO: 39.5 FL (ref 37–54)
EOSINOPHIL # BLD AUTO: 0.11 10*3/MM3 (ref 0–0.4)
EOSINOPHIL NFR BLD AUTO: 1.3 % (ref 0.3–6.2)
ERYTHROCYTE [DISTWIDTH] IN BLOOD BY AUTOMATED COUNT: 12.6 % (ref 12.3–15.4)
HCT VFR BLD AUTO: 46.9 % (ref 37.5–51)
HGB BLD-MCNC: 15.5 G/DL (ref 13–17.7)
HOLD SPECIMEN: NORMAL
IMM GRANULOCYTES # BLD AUTO: 0.13 10*3/MM3 (ref 0–0.05)
IMM GRANULOCYTES NFR BLD AUTO: 1.6 % (ref 0–0.5)
INR PPP: 1.42 (ref 0.89–1.12)
LYMPHOCYTES # BLD AUTO: 2.26 10*3/MM3 (ref 0.7–3.1)
LYMPHOCYTES NFR BLD AUTO: 27.7 % (ref 19.6–45.3)
MAGNESIUM SERPL-MCNC: 1.7 MG/DL (ref 1.6–2.6)
MCH RBC QN AUTO: 28.7 PG (ref 26.6–33)
MCHC RBC AUTO-ENTMCNC: 33 G/DL (ref 31.5–35.7)
MCV RBC AUTO: 86.9 FL (ref 79–97)
MONOCYTES # BLD AUTO: 0.38 10*3/MM3 (ref 0.1–0.9)
MONOCYTES NFR BLD AUTO: 4.7 % (ref 5–12)
NEUTROPHILS NFR BLD AUTO: 5.24 10*3/MM3 (ref 1.7–7)
NEUTROPHILS NFR BLD AUTO: 64.1 % (ref 42.7–76)
NRBC BLD AUTO-RTO: 0 /100 WBC (ref 0–0.2)
PLATELET # BLD AUTO: 193 10*3/MM3 (ref 140–450)
PMV BLD AUTO: 11.7 FL (ref 6–12)
PROTHROMBIN TIME: 17.5 SECONDS (ref 12.2–14.5)
QT INTERVAL: 344 MS
QTC INTERVAL: 389 MS
RBC # BLD AUTO: 5.4 10*6/MM3 (ref 4.14–5.8)
TROPONIN T SERPL HS-MCNC: 365 NG/L
WBC NRBC COR # BLD AUTO: 8.17 10*3/MM3 (ref 3.4–10.8)
WHOLE BLOOD HOLD COAG: NORMAL
WHOLE BLOOD HOLD SPECIMEN: NORMAL

## 2024-02-06 PROCEDURE — 25510000001 IOPAMIDOL PER 1 ML: Performed by: INTERNAL MEDICINE

## 2024-02-06 PROCEDURE — C1725 CATH, TRANSLUMIN NON-LASER: HCPCS | Performed by: INTERNAL MEDICINE

## 2024-02-06 PROCEDURE — 25810000003 SODIUM CHLORIDE 0.9 % SOLUTION: Performed by: INTERNAL MEDICINE

## 2024-02-06 PROCEDURE — 99291 CRITICAL CARE FIRST HOUR: CPT

## 2024-02-06 PROCEDURE — 83735 ASSAY OF MAGNESIUM: CPT | Performed by: INTERNAL MEDICINE

## 2024-02-06 PROCEDURE — C1769 GUIDE WIRE: HCPCS | Performed by: INTERNAL MEDICINE

## 2024-02-06 PROCEDURE — C9606 PERC D-E COR REVASC W AMI S: HCPCS | Performed by: INTERNAL MEDICINE

## 2024-02-06 PROCEDURE — 93005 ELECTROCARDIOGRAM TRACING: CPT

## 2024-02-06 PROCEDURE — 25010000002 FENTANYL CITRATE (PF) 50 MCG/ML SOLUTION: Performed by: INTERNAL MEDICINE

## 2024-02-06 PROCEDURE — C1887 CATHETER, GUIDING: HCPCS | Performed by: INTERNAL MEDICINE

## 2024-02-06 PROCEDURE — 99222 1ST HOSP IP/OBS MODERATE 55: CPT | Performed by: INTERNAL MEDICINE

## 2024-02-06 PROCEDURE — 71045 X-RAY EXAM CHEST 1 VIEW: CPT

## 2024-02-06 PROCEDURE — 82565 ASSAY OF CREATININE: CPT

## 2024-02-06 PROCEDURE — 93010 ELECTROCARDIOGRAM REPORT: CPT | Performed by: INTERNAL MEDICINE

## 2024-02-06 PROCEDURE — 85025 COMPLETE CBC W/AUTO DIFF WBC: CPT | Performed by: EMERGENCY MEDICINE

## 2024-02-06 PROCEDURE — 84484 ASSAY OF TROPONIN QUANT: CPT | Performed by: EMERGENCY MEDICINE

## 2024-02-06 PROCEDURE — 92941 PRQ TRLML REVSC TOT OCCL AMI: CPT | Performed by: INTERNAL MEDICINE

## 2024-02-06 PROCEDURE — C1874 STENT, COATED/COV W/DEL SYS: HCPCS | Performed by: INTERNAL MEDICINE

## 2024-02-06 PROCEDURE — 93458 L HRT ARTERY/VENTRICLE ANGIO: CPT | Performed by: INTERNAL MEDICINE

## 2024-02-06 PROCEDURE — 25010000002 BIVALIRUDIN TRIFLUOROACETATE 250 MG RECONSTITUTED SOLUTION 1 EACH VIAL: Performed by: INTERNAL MEDICINE

## 2024-02-06 PROCEDURE — 027034Z DILATION OF CORONARY ARTERY, ONE ARTERY WITH DRUG-ELUTING INTRALUMINAL DEVICE, PERCUTANEOUS APPROACH: ICD-10-PCS | Performed by: INTERNAL MEDICINE

## 2024-02-06 PROCEDURE — B2111ZZ FLUOROSCOPY OF MULTIPLE CORONARY ARTERIES USING LOW OSMOLAR CONTRAST: ICD-10-PCS | Performed by: INTERNAL MEDICINE

## 2024-02-06 PROCEDURE — 93005 ELECTROCARDIOGRAM TRACING: CPT | Performed by: EMERGENCY MEDICINE

## 2024-02-06 PROCEDURE — 83036 HEMOGLOBIN GLYCOSYLATED A1C: CPT | Performed by: PEDIATRICS

## 2024-02-06 PROCEDURE — 4A023N7 MEASUREMENT OF CARDIAC SAMPLING AND PRESSURE, LEFT HEART, PERCUTANEOUS APPROACH: ICD-10-PCS | Performed by: INTERNAL MEDICINE

## 2024-02-06 PROCEDURE — 25010000002 HEPARIN (PORCINE) PER 1000 UNITS: Performed by: EMERGENCY MEDICINE

## 2024-02-06 PROCEDURE — 93005 ELECTROCARDIOGRAM TRACING: CPT | Performed by: INTERNAL MEDICINE

## 2024-02-06 PROCEDURE — 25010000002 NICARDIPINE 2.5 MG/ML SOLUTION: Performed by: INTERNAL MEDICINE

## 2024-02-06 PROCEDURE — 25010000002 HEPARIN (PORCINE) PER 1000 UNITS: Performed by: INTERNAL MEDICINE

## 2024-02-06 PROCEDURE — 25010000002 MIDAZOLAM PER 1 MG: Performed by: INTERNAL MEDICINE

## 2024-02-06 PROCEDURE — 85610 PROTHROMBIN TIME: CPT | Performed by: INTERNAL MEDICINE

## 2024-02-06 PROCEDURE — C1894 INTRO/SHEATH, NON-LASER: HCPCS | Performed by: INTERNAL MEDICINE

## 2024-02-06 DEVICE — IMPLANTABLE DEVICE: Type: IMPLANTABLE DEVICE | Site: CORONARY | Status: FUNCTIONAL

## 2024-02-06 RX ORDER — SODIUM CHLORIDE 9 MG/ML
3 INJECTION, SOLUTION INTRAVENOUS CONTINUOUS
Status: ACTIVE | OUTPATIENT
Start: 2024-02-06 | End: 2024-02-07

## 2024-02-06 RX ORDER — VALSARTAN 160 MG/1
80 TABLET ORAL DAILY
Status: DISCONTINUED | OUTPATIENT
Start: 2024-02-07 | End: 2024-02-08 | Stop reason: HOSPADM

## 2024-02-06 RX ORDER — ASPIRIN 81 MG/1
81 TABLET ORAL DAILY
Status: DISCONTINUED | OUTPATIENT
Start: 2024-02-07 | End: 2024-02-08 | Stop reason: HOSPADM

## 2024-02-06 RX ORDER — DIPHENHYDRAMINE HYDROCHLORIDE 50 MG/ML
25 INJECTION INTRAMUSCULAR; INTRAVENOUS EVERY 6 HOURS PRN
Status: DISCONTINUED | OUTPATIENT
Start: 2024-02-06 | End: 2024-02-08 | Stop reason: HOSPADM

## 2024-02-06 RX ORDER — BISACODYL 10 MG
10 SUPPOSITORY, RECTAL RECTAL DAILY PRN
Status: DISCONTINUED | OUTPATIENT
Start: 2024-02-06 | End: 2024-02-08 | Stop reason: HOSPADM

## 2024-02-06 RX ORDER — NITROGLYCERIN 0.4 MG/1
0.4 TABLET SUBLINGUAL
Status: DISCONTINUED | OUTPATIENT
Start: 2024-02-06 | End: 2024-02-08 | Stop reason: HOSPADM

## 2024-02-06 RX ORDER — ASPIRIN 81 MG/1
324 TABLET, CHEWABLE ORAL ONCE
Status: COMPLETED | OUTPATIENT
Start: 2024-02-06 | End: 2024-02-06

## 2024-02-06 RX ORDER — HEPARIN SODIUM 1000 [USP'U]/ML
INJECTION, SOLUTION INTRAVENOUS; SUBCUTANEOUS
Status: DISCONTINUED | OUTPATIENT
Start: 2024-02-06 | End: 2024-02-06 | Stop reason: HOSPADM

## 2024-02-06 RX ORDER — BISACODYL 5 MG/1
5 TABLET, DELAYED RELEASE ORAL DAILY PRN
Status: DISCONTINUED | OUTPATIENT
Start: 2024-02-06 | End: 2024-02-08 | Stop reason: HOSPADM

## 2024-02-06 RX ORDER — ACETAMINOPHEN 325 MG/1
650 TABLET ORAL EVERY 4 HOURS PRN
Status: DISCONTINUED | OUTPATIENT
Start: 2024-02-06 | End: 2024-02-08 | Stop reason: HOSPADM

## 2024-02-06 RX ORDER — SODIUM CHLORIDE 9 MG/ML
40 INJECTION, SOLUTION INTRAVENOUS AS NEEDED
Status: DISCONTINUED | OUTPATIENT
Start: 2024-02-06 | End: 2024-02-08 | Stop reason: HOSPADM

## 2024-02-06 RX ORDER — SODIUM CHLORIDE 0.9 % (FLUSH) 0.9 %
10 SYRINGE (ML) INJECTION AS NEEDED
Status: DISCONTINUED | OUTPATIENT
Start: 2024-02-06 | End: 2024-02-08 | Stop reason: HOSPADM

## 2024-02-06 RX ORDER — HEPARIN SODIUM 5000 [USP'U]/ML
INJECTION, SOLUTION INTRAVENOUS; SUBCUTANEOUS
Status: COMPLETED | OUTPATIENT
Start: 2024-02-06 | End: 2024-02-06

## 2024-02-06 RX ORDER — FENTANYL CITRATE 50 UG/ML
INJECTION, SOLUTION INTRAMUSCULAR; INTRAVENOUS
Status: DISCONTINUED | OUTPATIENT
Start: 2024-02-06 | End: 2024-02-06 | Stop reason: HOSPADM

## 2024-02-06 RX ORDER — HEPARIN SODIUM 5000 [USP'U]/ML
5000 INJECTION, SOLUTION INTRAVENOUS; SUBCUTANEOUS EVERY 12 HOURS SCHEDULED
Status: DISCONTINUED | OUTPATIENT
Start: 2024-02-06 | End: 2024-02-08 | Stop reason: HOSPADM

## 2024-02-06 RX ORDER — MIDAZOLAM HYDROCHLORIDE 1 MG/ML
INJECTION INTRAMUSCULAR; INTRAVENOUS
Status: DISCONTINUED | OUTPATIENT
Start: 2024-02-06 | End: 2024-02-06 | Stop reason: HOSPADM

## 2024-02-06 RX ORDER — POLYETHYLENE GLYCOL 3350 17 G/17G
17 POWDER, FOR SOLUTION ORAL DAILY PRN
Status: DISCONTINUED | OUTPATIENT
Start: 2024-02-06 | End: 2024-02-08 | Stop reason: HOSPADM

## 2024-02-06 RX ORDER — METOPROLOL SUCCINATE 25 MG/1
25 TABLET, EXTENDED RELEASE ORAL
Status: DISCONTINUED | OUTPATIENT
Start: 2024-02-07 | End: 2024-02-08 | Stop reason: HOSPADM

## 2024-02-06 RX ORDER — CLOPIDOGREL BISULFATE 75 MG/1
75 TABLET ORAL DAILY
Status: DISCONTINUED | OUTPATIENT
Start: 2024-02-07 | End: 2024-02-08 | Stop reason: HOSPADM

## 2024-02-06 RX ORDER — CLOPIDOGREL BISULFATE 75 MG/1
TABLET ORAL
Status: COMPLETED | OUTPATIENT
Start: 2024-02-06 | End: 2024-02-06

## 2024-02-06 RX ORDER — AMOXICILLIN 250 MG
2 CAPSULE ORAL 2 TIMES DAILY
Status: DISCONTINUED | OUTPATIENT
Start: 2024-02-06 | End: 2024-02-08 | Stop reason: HOSPADM

## 2024-02-06 RX ORDER — NICARDIPINE HCL-0.9% SOD CHLOR 1 MG/10 ML
SYRINGE (ML) INTRAVENOUS
Status: DISCONTINUED | OUTPATIENT
Start: 2024-02-06 | End: 2024-02-06 | Stop reason: HOSPADM

## 2024-02-06 RX ORDER — LIDOCAINE HYDROCHLORIDE 10 MG/ML
INJECTION, SOLUTION EPIDURAL; INFILTRATION; INTRACAUDAL; PERINEURAL
Status: DISCONTINUED | OUTPATIENT
Start: 2024-02-06 | End: 2024-02-06 | Stop reason: HOSPADM

## 2024-02-06 RX ORDER — SODIUM CHLORIDE 9 MG/ML
10 INJECTION, SOLUTION INTRAMUSCULAR; INTRAVENOUS; SUBCUTANEOUS AS NEEDED
Status: DISCONTINUED | OUTPATIENT
Start: 2024-02-06 | End: 2024-02-08 | Stop reason: HOSPADM

## 2024-02-06 RX ORDER — ROSUVASTATIN CALCIUM 20 MG/1
20 TABLET, COATED ORAL NIGHTLY
Status: DISCONTINUED | OUTPATIENT
Start: 2024-02-06 | End: 2024-02-08 | Stop reason: HOSPADM

## 2024-02-06 RX ORDER — SODIUM CHLORIDE 0.9 % (FLUSH) 0.9 %
10 SYRINGE (ML) INJECTION EVERY 12 HOURS SCHEDULED
Status: DISCONTINUED | OUTPATIENT
Start: 2024-02-06 | End: 2024-02-08 | Stop reason: HOSPADM

## 2024-02-06 RX ORDER — ALPRAZOLAM 0.25 MG/1
0.25 TABLET ORAL 3 TIMES DAILY PRN
Status: DISCONTINUED | OUTPATIENT
Start: 2024-02-06 | End: 2024-02-08 | Stop reason: HOSPADM

## 2024-02-06 RX ADMIN — Medication 10 ML: at 22:38

## 2024-02-06 RX ADMIN — SODIUM CHLORIDE 3 ML/KG/HR: 9 INJECTION, SOLUTION INTRAVENOUS at 22:38

## 2024-02-06 RX ADMIN — HEPARIN SODIUM 6800 UNITS: 5000 INJECTION, SOLUTION INTRAVENOUS; SUBCUTANEOUS at 19:58

## 2024-02-06 RX ADMIN — HEPARIN SODIUM 5000 UNITS: 5000 INJECTION INTRAVENOUS; SUBCUTANEOUS at 22:37

## 2024-02-06 RX ADMIN — ROSUVASTATIN CALCIUM 20 MG: 20 TABLET, FILM COATED ORAL at 22:36

## 2024-02-06 RX ADMIN — CLOPIDOGREL BISULFATE 600 MG: 75 TABLET ORAL at 19:56

## 2024-02-06 RX ADMIN — ASPIRIN 324 MG: 81 TABLET, CHEWABLE ORAL at 22:36

## 2024-02-06 NOTE — Clinical Note
First balloon inflation max pressure = 14 griselda. First balloon inflation duration = 15 seconds. Second inflation of balloon - Max pressure = 14 griselda. 2nd Inflation of balloon - Duration = 15 seconds. Third inflation of balloon - Max pressure = 12 griselda. 3rd Inflation of balloon - Duration = 15 seconds.

## 2024-02-06 NOTE — Clinical Note
Hemostasis started on the right radial artery. R-Band was used in achieving hemostasis. Radial compression device applied to vessel. Hemostasis achieved successfully. Closure device additional comment: Tr-12 ml

## 2024-02-06 NOTE — Clinical Note
First balloon inflation max pressure = 12 griselda. First balloon inflation duration = 15 seconds. Second inflation of balloon - Max pressure = 12 griselda. 2nd Inflation of balloon - Duration = 15 seconds.

## 2024-02-07 ENCOUNTER — TRANSCRIBE ORDERS (OUTPATIENT)
Dept: CARDIAC REHAB | Facility: HOSPITAL | Age: 49
End: 2024-02-07
Payer: COMMERCIAL

## 2024-02-07 ENCOUNTER — APPOINTMENT (OUTPATIENT)
Dept: CARDIOLOGY | Facility: HOSPITAL | Age: 49
End: 2024-02-07
Payer: COMMERCIAL

## 2024-02-07 DIAGNOSIS — I21.3 ST ELEVATION MYOCARDIAL INFARCTION (STEMI), UNSPECIFIED ARTERY: Primary | ICD-10-CM

## 2024-02-07 PROBLEM — E11.9 TYPE 2 DIABETES MELLITUS WITHOUT COMPLICATION, WITHOUT LONG-TERM CURRENT USE OF INSULIN: Chronic | Status: ACTIVE | Noted: 2024-02-07

## 2024-02-07 LAB
ANION GAP SERPL CALCULATED.3IONS-SCNC: 14 MMOL/L (ref 5–15)
ASCENDING AORTA: 4.1 CM
BH CV ECHO MEAS - AO MAX PG: 6.4 MMHG
BH CV ECHO MEAS - AO MEAN PG: 3 MMHG
BH CV ECHO MEAS - AO ROOT DIAM: 3 CM
BH CV ECHO MEAS - AO V2 MAX: 126 CM/SEC
BH CV ECHO MEAS - AO V2 VTI: 23.1 CM
BH CV ECHO MEAS - AVA(I,D): 3.3 CM2
BH CV ECHO MEAS - EDV(CUBED): 85.2 ML
BH CV ECHO MEAS - EDV(MOD-SP2): 94.3 ML
BH CV ECHO MEAS - EDV(MOD-SP4): 130 ML
BH CV ECHO MEAS - EF(MOD-BP): 58.9 %
BH CV ECHO MEAS - EF(MOD-SP2): 54.3 %
BH CV ECHO MEAS - EF(MOD-SP4): 62 %
BH CV ECHO MEAS - ESV(CUBED): 32.8 ML
BH CV ECHO MEAS - ESV(MOD-SP2): 43.1 ML
BH CV ECHO MEAS - ESV(MOD-SP4): 49.4 ML
BH CV ECHO MEAS - FS: 27.3 %
BH CV ECHO MEAS - IVS/LVPW: 1 CM
BH CV ECHO MEAS - IVSD: 0.9 CM
BH CV ECHO MEAS - LA DIMENSION: 4.2 CM
BH CV ECHO MEAS - LAT PEAK E' VEL: 12.2 CM/SEC
BH CV ECHO MEAS - LV MASS(C)D: 128 GRAMS
BH CV ECHO MEAS - LV MAX PG: 5.2 MMHG
BH CV ECHO MEAS - LV MEAN PG: 3 MMHG
BH CV ECHO MEAS - LV V1 MAX: 114 CM/SEC
BH CV ECHO MEAS - LV V1 VTI: 20.3 CM
BH CV ECHO MEAS - LVIDD: 4.4 CM
BH CV ECHO MEAS - LVIDS: 3.2 CM
BH CV ECHO MEAS - LVOT AREA: 3.8 CM2
BH CV ECHO MEAS - LVOT DIAM: 2.2 CM
BH CV ECHO MEAS - LVPWD: 0.9 CM
BH CV ECHO MEAS - MED PEAK E' VEL: 9.6 CM/SEC
BH CV ECHO MEAS - MV A MAX VEL: 80.7 CM/SEC
BH CV ECHO MEAS - MV DEC SLOPE: 551 CM/SEC2
BH CV ECHO MEAS - MV DEC TIME: 0.22 SEC
BH CV ECHO MEAS - MV E MAX VEL: 98.5 CM/SEC
BH CV ECHO MEAS - MV E/A: 1.22
BH CV ECHO MEAS - MV MAX PG: 4.9 MMHG
BH CV ECHO MEAS - MV MEAN PG: 2 MMHG
BH CV ECHO MEAS - MV P1/2T: 54.2 MSEC
BH CV ECHO MEAS - MV V2 VTI: 24.7 CM
BH CV ECHO MEAS - MVA(P1/2T): 4.1 CM2
BH CV ECHO MEAS - MVA(VTI): 3.1 CM2
BH CV ECHO MEAS - PA ACC TIME: 0.09 SEC
BH CV ECHO MEAS - SV(LVOT): 77.2 ML
BH CV ECHO MEAS - SV(MOD-SP2): 51.2 ML
BH CV ECHO MEAS - SV(MOD-SP4): 80.6 ML
BH CV ECHO MEAS - TAPSE (>1.6): 2.9 CM
BH CV ECHO MEASUREMENTS AVERAGE E/E' RATIO: 9.04
BH CV XLRA - RV BASE: 3.6 CM
BH CV XLRA - RV LENGTH: 7.4 CM
BH CV XLRA - RV MID: 3.2 CM
BH CV XLRA - TDI S': 14.6 CM/SEC
BUN SERPL-MCNC: 12 MG/DL (ref 6–20)
BUN/CREAT SERPL: 14.1 (ref 7–25)
CALCIUM SPEC-SCNC: 9.5 MG/DL (ref 8.6–10.5)
CHLORIDE SERPL-SCNC: 103 MMOL/L (ref 98–107)
CHOLEST SERPL-MCNC: 211 MG/DL (ref 0–200)
CO2 SERPL-SCNC: 22 MMOL/L (ref 22–29)
CREAT BLDA-MCNC: 0.9 MG/DL (ref 0.6–1.3)
CREAT SERPL-MCNC: 0.85 MG/DL (ref 0.76–1.27)
DEPRECATED RDW RBC AUTO: 38.6 FL (ref 37–54)
EGFRCR SERPLBLD CKD-EPI 2021: 106.5 ML/MIN/1.73
ERYTHROCYTE [DISTWIDTH] IN BLOOD BY AUTOMATED COUNT: 12.5 % (ref 12.3–15.4)
FERRITIN SERPL-MCNC: 451.7 NG/ML (ref 30–400)
GLUCOSE BLDC GLUCOMTR-MCNC: 133 MG/DL (ref 70–130)
GLUCOSE BLDC GLUCOMTR-MCNC: 145 MG/DL (ref 70–130)
GLUCOSE BLDC GLUCOMTR-MCNC: 173 MG/DL (ref 70–130)
GLUCOSE BLDC GLUCOMTR-MCNC: 174 MG/DL (ref 70–130)
GLUCOSE SERPL-MCNC: 180 MG/DL (ref 65–99)
HBA1C MFR BLD: 8.8 % (ref 4.8–5.6)
HCT VFR BLD AUTO: 40.3 % (ref 37.5–51)
HDLC SERPL-MCNC: 31 MG/DL (ref 40–60)
HGB BLD-MCNC: 13.5 G/DL (ref 13–17.7)
HOLD SPECIMEN: NORMAL
IRON 24H UR-MRATE: 66 MCG/DL (ref 59–158)
IRON SATN MFR SERPL: 19 % (ref 20–50)
LDLC SERPL CALC-MCNC: 119 MG/DL (ref 0–100)
LDLC/HDLC SERPL: 3.58 {RATIO}
LV EF 2D ECHO EST: 60 %
MCH RBC QN AUTO: 28.5 PG (ref 26.6–33)
MCHC RBC AUTO-ENTMCNC: 33.5 G/DL (ref 31.5–35.7)
MCV RBC AUTO: 85.2 FL (ref 79–97)
PLATELET # BLD AUTO: 191 10*3/MM3 (ref 140–450)
PMV BLD AUTO: 11.3 FL (ref 6–12)
POTASSIUM SERPL-SCNC: 4.3 MMOL/L (ref 3.5–5.2)
QT INTERVAL: 398 MS
QTC INTERVAL: 410 MS
RBC # BLD AUTO: 4.73 10*6/MM3 (ref 4.14–5.8)
SODIUM SERPL-SCNC: 139 MMOL/L (ref 136–145)
TIBC SERPL-MCNC: 350 MCG/DL (ref 298–536)
TRANSFERRIN SERPL-MCNC: 235 MG/DL (ref 200–360)
TRIGL SERPL-MCNC: 345 MG/DL (ref 0–150)
TSH SERPL DL<=0.05 MIU/L-ACNC: 0.65 UIU/ML (ref 0.27–4.2)
VLDLC SERPL-MCNC: 61 MG/DL (ref 5–40)
WBC NRBC COR # BLD AUTO: 8.01 10*3/MM3 (ref 3.4–10.8)

## 2024-02-07 PROCEDURE — 84443 ASSAY THYROID STIM HORMONE: CPT | Performed by: INTERNAL MEDICINE

## 2024-02-07 PROCEDURE — 99232 SBSQ HOSP IP/OBS MODERATE 35: CPT | Performed by: INTERNAL MEDICINE

## 2024-02-07 PROCEDURE — 80048 BASIC METABOLIC PNL TOTAL CA: CPT | Performed by: INTERNAL MEDICINE

## 2024-02-07 PROCEDURE — 94799 UNLISTED PULMONARY SVC/PX: CPT

## 2024-02-07 PROCEDURE — 63710000001 INSULIN LISPRO (HUMAN) PER 5 UNITS: Performed by: PEDIATRICS

## 2024-02-07 PROCEDURE — 83540 ASSAY OF IRON: CPT | Performed by: INTERNAL MEDICINE

## 2024-02-07 PROCEDURE — 84466 ASSAY OF TRANSFERRIN: CPT | Performed by: INTERNAL MEDICINE

## 2024-02-07 PROCEDURE — 80061 LIPID PANEL: CPT | Performed by: INTERNAL MEDICINE

## 2024-02-07 PROCEDURE — 99221 1ST HOSP IP/OBS SF/LOW 40: CPT | Performed by: STUDENT IN AN ORGANIZED HEALTH CARE EDUCATION/TRAINING PROGRAM

## 2024-02-07 PROCEDURE — 93306 TTE W/DOPPLER COMPLETE: CPT | Performed by: INTERNAL MEDICINE

## 2024-02-07 PROCEDURE — 94660 CPAP INITIATION&MGMT: CPT

## 2024-02-07 PROCEDURE — 25810000003 SODIUM CHLORIDE 0.9 % SOLUTION: Performed by: INTERNAL MEDICINE

## 2024-02-07 PROCEDURE — 82948 REAGENT STRIP/BLOOD GLUCOSE: CPT

## 2024-02-07 PROCEDURE — 93306 TTE W/DOPPLER COMPLETE: CPT

## 2024-02-07 PROCEDURE — 25010000002 HEPARIN (PORCINE) PER 1000 UNITS: Performed by: INTERNAL MEDICINE

## 2024-02-07 PROCEDURE — 82728 ASSAY OF FERRITIN: CPT | Performed by: INTERNAL MEDICINE

## 2024-02-07 PROCEDURE — 85027 COMPLETE CBC AUTOMATED: CPT | Performed by: INTERNAL MEDICINE

## 2024-02-07 RX ORDER — SPIRONOLACTONE 25 MG/1
12.5 TABLET ORAL DAILY
Status: DISCONTINUED | OUTPATIENT
Start: 2024-02-07 | End: 2024-02-08 | Stop reason: HOSPADM

## 2024-02-07 RX ORDER — DEXTROSE MONOHYDRATE 25 G/50ML
25 INJECTION, SOLUTION INTRAVENOUS
Status: DISCONTINUED | OUTPATIENT
Start: 2024-02-07 | End: 2024-02-08 | Stop reason: HOSPADM

## 2024-02-07 RX ORDER — INSULIN LISPRO 100 [IU]/ML
2-7 INJECTION, SOLUTION INTRAVENOUS; SUBCUTANEOUS
Status: DISCONTINUED | OUTPATIENT
Start: 2024-02-07 | End: 2024-02-08 | Stop reason: HOSPADM

## 2024-02-07 RX ORDER — IBUPROFEN 600 MG/1
1 TABLET ORAL
Status: DISCONTINUED | OUTPATIENT
Start: 2024-02-07 | End: 2024-02-08 | Stop reason: HOSPADM

## 2024-02-07 RX ORDER — NICOTINE POLACRILEX 4 MG
15 LOZENGE BUCCAL
Status: DISCONTINUED | OUTPATIENT
Start: 2024-02-07 | End: 2024-02-08 | Stop reason: HOSPADM

## 2024-02-07 RX ADMIN — ROSUVASTATIN CALCIUM 20 MG: 20 TABLET, FILM COATED ORAL at 20:32

## 2024-02-07 RX ADMIN — INSULIN LISPRO 2 UNITS: 100 INJECTION, SOLUTION INTRAVENOUS; SUBCUTANEOUS at 08:23

## 2024-02-07 RX ADMIN — CLOPIDOGREL BISULFATE 75 MG: 75 TABLET ORAL at 08:23

## 2024-02-07 RX ADMIN — VALSARTAN 80 MG: 160 TABLET, FILM COATED ORAL at 08:23

## 2024-02-07 RX ADMIN — HEPARIN SODIUM 5000 UNITS: 5000 INJECTION INTRAVENOUS; SUBCUTANEOUS at 08:24

## 2024-02-07 RX ADMIN — SPIRONOLACTONE 12.5 MG: 25 TABLET ORAL at 11:35

## 2024-02-07 RX ADMIN — SODIUM CHLORIDE 3 ML/KG/HR: 9 INJECTION, SOLUTION INTRAVENOUS at 02:06

## 2024-02-07 RX ADMIN — EMPAGLIFLOZIN 10 MG: 10 TABLET, FILM COATED ORAL at 11:35

## 2024-02-07 RX ADMIN — ASPIRIN 81 MG: 81 TABLET, COATED ORAL at 08:24

## 2024-02-07 RX ADMIN — Medication 10 ML: at 20:33

## 2024-02-07 RX ADMIN — METOPROLOL SUCCINATE 25 MG: 25 TABLET, EXTENDED RELEASE ORAL at 08:24

## 2024-02-07 RX ADMIN — INSULIN LISPRO 2 UNITS: 100 INJECTION, SOLUTION INTRAVENOUS; SUBCUTANEOUS at 11:34

## 2024-02-07 RX ADMIN — Medication 10 ML: at 08:25

## 2024-02-07 RX ADMIN — HEPARIN SODIUM 5000 UNITS: 5000 INJECTION INTRAVENOUS; SUBCUTANEOUS at 20:33

## 2024-02-07 NOTE — CASE MANAGEMENT/SOCIAL WORK
Discharge Planning Assessment  [facility]     Patient Name: Jono Mcintyre  MRN: [id number]  Today's Date: 2/7/2024    Admit Date: 2/6/2024    Plan: Home with family   Discharge Needs Assessment       Row Name 02/07/24 0819       Living Environment    People in Home spouse;child(allan), dependent    Name(s) of People in Home Laxmi (spouse) [phone]    Current Living Arrangements home    Potentially Unsafe Housing Conditions none    Primary Care Provided by self    Provides Primary Care For no one    Family Caregiver if Needed spouse    Able to Return to Prior Arrangements yes       Resource/Environmental Concerns    Resource/Environmental Concerns none    Transportation Concerns none       Transition Planning    Patient/Family Anticipates Transition to home with family    Patient/Family Anticipated Services at Transition none    Transportation Anticipated family or friend will provide       Discharge Needs Assessment    Readmission Within the Last 30 Days no previous admission in last 30 days    Equipment Currently Used at Home cpap;bp cuff    Concerns to be Addressed denies needs/concerns at this time    Anticipated Changes Related to Illness none    Equipment Needed After Discharge none                   Discharge Plan       Row Name 02/07/24 0821       Plan    Plan Home with family    Patient/Family in Agreement with Plan yes    Plan Comments Spoke with patient at bedside. . Lives with Laxmi (spouse) [phone] in [place]. Is independent with ADL's. No problems with [employer] or medications. Uses a CPAP at home. Has advanced directives. Plan is home with family. Spouse will transport. CM will continue to follow.    Final Discharge Disposition Code 01 - home or self-care                  Continued Care and Services - Admitted Since 2/6/2024    Coordination has not been started for this encounter.          Demographic Summary       Row Name 02/07/24 0819       General Information    Admission Type  inpatient    Arrived From emergency department    Referral Source admission list    Reason for Consult discharge planning    Preferred Language English       Contact Information    Permission Granted to Share Info With     Contact Information Obtained for                    Functional Status       Row Name 02/07/24 0819       Functional Status    Usual Activity Tolerance good    Current Activity Tolerance good       Functional Status, IADL    Medications independent    Meal Preparation independent    Housekeeping independent    Laundry independent    Shopping independent       Mental Status    General Appearance WDL WDL       Mental Status Summary    Recent Changes in Mental Status/Cognitive Functioning no changes       Employment/    Employment Status employed full-time                   Psychosocial    No documentation.                  Abuse/Neglect    No documentation.                  Legal    No documentation.                  Substance Abuse    No documentation.                  Patient Forms    No documentation.                     Umesh Wilson RN

## 2024-02-07 NOTE — CONSULTS
[facility] Medicine Services  CONSULT NOTE      Patient Name: Jono Mcintyre  : 1975  MRN: [id number]    Primary Care Physician: Freddie Zarco MD  Provider requesting consultation: No ref. provider found    Subjective   Subjective     Reason for Consultation:  Medical management of diabetes     HPI:  Jono Mcintyre is a 48 y.o. male with past medical history significant for hypertension, hyperlipidemia and type 2 diabetes mellitus, was admitted for a STEMI.  Cardiology was consulted and immediately performed left heart cath.  Patient seen this morning postcatheterization, reports no further episodes of chest discomfort or shortness of breath.  States his diabetes has been controlled with metformin and he has been aggressively pursuing lifestyle changes to optimize his hyperglycemia.        Personal History     Past Medical History:   Diagnosis Date    Hyperlipidemia     Hypertension     Sleep apnea        History reviewed. No pertinent surgical history.    Family History:  family history includes Alzheimer's disease in an other family member; Diabetes in an other family member; Heart disease in an other family member; Hypertension in his father and another family member; Stroke in an other family member. Otherwise pertinent FHx was reviewed and unremarkable.     Social History:  reports that he has never smoked. He has never used smokeless tobacco. He reports current alcohol use. He reports that he does not use drugs.    Medications:  allopurinol, apixaban, benazepril, benzonatate, cetirizine, famotidine, fluticasone, and metFORMIN    Scheduled Meds:aspirin, 81 mg, Oral, Daily  clopidogrel, 75 mg, Oral, Daily  empagliflozin, 10 mg, Oral, Daily  heparin (porcine), 5,000 Units, Subcutaneous, Q12H  insulin lispro, 2-7 Units, Subcutaneous, 4x Daily AC & at Bedtime  metoprolol succinate XL, 25 mg, Oral, Q24H  rosuvastatin, 20 mg, Oral, Nightly  senna-docusate sodium, 2 tablet,  Oral, BID  sodium chloride, 10 mL, Intravenous, Q12H  spironolactone, 12.5 mg, Oral, Daily  valsartan, 80 mg, Oral, Daily      Continuous Infusions:   PRN Meds:.  acetaminophen    ALPRAZolam    senna-docusate sodium **AND** polyethylene glycol **AND** bisacodyl **AND** bisacodyl    dextrose    dextrose    diphenhydrAMINE    glucagon (human recombinant)    nitroglycerin    Sodium Chloride (PF)    sodium chloride    sodium chloride    Allergies   Allergen Reactions    No Known Drug Allergy        Objective   Objective     Vital Signs:   Temp:  [96.9 °F (36.1 °C)-97.7 °F (36.5 °C)] 97.7 °F (36.5 °C)  Heart Rate:  [65-84] 66  Resp:  [16-20] 16  BP: (132-207)/() 169/114  Flow (L/min):  [2] 2    Physical Exam  General appearance: alert, awake, oriented, no acute distress   Cardiovascular: RRR, no murmurs or rubs, radial pulse full 2/4 BL   Respiratory: CTAB, no crackles or wheezes   Abdomen: soft, non-tender, obese, no organomegaly, bowel sounds normoactive    Neuro/CNS: alert and oriented x3, normal speech      Result Review:  I have personally reviewed the results from the time of admission and agree with these findings:  [x]  Laboratory  []  Radiology  [x]  EKG/Telemetry   []  Pathology  [x]  Old records  []  Other:  Most notable findings include:     LAB RESULTS:      Lab 02/07/24 0718 02/06/24 2210 02/06/24 2004   WBC 8.01  --  8.17   HEMOGLOBIN 13.5  --  15.5   HEMATOCRIT 40.3  --  46.9   PLATELETS 191  --  193   NEUTROS ABS  --   --  5.24   IMMATURE GRANS (ABS)  --   --  0.13*   LYMPHS ABS  --   --  2.26   MONOS ABS  --   --  0.38   EOS ABS  --   --  0.11   MCV 85.2  --  86.9   PROTIME  --  17.5*  --          Lab 02/07/24 0718 02/06/24 2210 02/06/24 2004   SODIUM 139  --   --    POTASSIUM 4.3  --   --    CHLORIDE 103  --   --    CO2 22.0  --   --    ANION GAP 14.0  --   --    BUN 12  --   --    CREATININE 0.85  --   --    EGFR 106.5  --   --    GLUCOSE 180*  --   --    CALCIUM 9.5  --   --    MAGNESIUM   --  1.7  --    HEMOGLOBIN A1C  --   --  8.80*   TSH 0.650  --   --              Lab 02/06/24  2210   HSTROP T 365*   PROTIME 17.5*   INR 1.42*         Lab 02/07/24  0718   CHOLESTEROL 211*   LDL CHOL 119*   HDL CHOL 31*   TRIGLYCERIDES 345*             Brief Urine Lab Results       None          Microbiology Results (last 10 days)       ** No results found for the last 240 hours. **            XR Chest 1 View    Result Date: 2/6/2024  XR CHEST 1 VW Date of Exam: 2/6/2024 8:41 PM CST Indication: Acute STEMI traige protocol Comparison: Chest CT 9/13/2020 Findings: Cardiomediastinal silhouette is unremarkable.  No airspace disease, pneumothorax, nor pleural effusion. No acute osseous abnormality identified.     Impression: Impression: No acute process identified. Electronically Signed: Carmelo Odom MD  2/6/2024 9:04 PM CST  Workstation ID: [id number]    Cardiac Catheterization/Vascular Study    Result Date: 2/6/2024    Acute inferior STEMI and occluded RCA.  Treated 3.5 x 28 Xience ALEC postdilated 4.0 NC trek.   30% distal RCA, mid LAD, and mid LCx.   LVEF 65%          Assessment & Plan   Assessment & Plan     Active Hospital Problems    Diagnosis  POA    **STEMI involving right coronary artery [I21.11]  Yes    Type 2 diabetes mellitus without complication, without long-term current use of insulin [E11.9]  Yes      Resolved Hospital Problems   No resolved problems to display.       Non-insulin-dependent type 2 diabetes mellitus  -Continue low-dose sliding scale insulin with Accu-Cheks while inpatient  -Plan to restart metformin upon discharge with attempts to optimize lifestyle interventions prior to adding any additional pharmacotherapies; will ultimately defer to patient's primary care physician.  -Stable for discharge once all agree     CAD  STEMI  Hypertension  Hyperlipidemia  -Management per cardiology      Thank you for allowing [facility] Medicine Service to provide consultative care for your patient, we  will continue to follow while clinically appropriate.    Total time spent: 58 min   Time spent includes time reviewing chart, face-to-face time, counseling patient/family/caregiver, ordering medications/tests/procedures, communicating with other health care professionals, documenting clinical information in the electronic health record, and coordination of care.      Umesh Tate, DO  02/07/24

## 2024-02-07 NOTE — PROGRESS NOTES
"[facility]  IP Progress Note    PROBLEM LIST:  Inferior ST segment elevation MI s/p PTCA and stent RCA  Hypertension  Hyperlipidemia  Increased ferritin      HOSPITAL COURSE:  Patient was admitted with inferior ST segment elevation MI and undergone successful revascularization.      CHIEF COMPLAINTS:  Chest pain      Subjective   Patient is doing fine with no symptoms.      Objective     Blood pressure (!) 169/114, pulse 66, temperature 97.7 °F (36.5 °C), temperature source Oral, resp. rate 16, height 177.8 cm (70\"), weight 112 kg (248 lb), SpO2 96%.     Intake/Output Summary (Last 24 hours) at 2/7/2024 0839  Last data filed at 2/7/2024 0700  Gross per 24 hour   Intake --   Output 1530 ml   Net -1530 ml       PHYSICAL EXAM:  Constitutional:       General: Awake and alert     Appearance: Healthy appearance    Neck:     JVP: No JVP     Carotid artery: No carotid bruit    Pulmonary:      Effort: Pulmonary effort is normal.      Breath sounds: Good breath sounds    Cardiovascular:      Normal rate. Regular rhythm. Normal S1. Normal S2.      Murmurs: There is no murmur.      No gallop. No click. No rub.     Abdominal:      General: Bowel sounds are normal.      Palpations: Abdomen is soft.      Tenderness: There is no abdominal tenderness.    Extremities:     Pulses: Good pulses, cath site looks good     Edema: No edema        MEDICATIONS:    aspirin, 81 mg, Oral, Daily  clopidogrel, 75 mg, Oral, Daily  heparin (porcine), 5,000 Units, Subcutaneous, Q12H  insulin lispro, 2-7 Units, Subcutaneous, 4x Daily AC & at Bedtime  metoprolol succinate XL, 25 mg, Oral, Q24H  rosuvastatin, 20 mg, Oral, Nightly  senna-docusate sodium, 2 tablet, Oral, BID  sodium chloride, 10 mL, Intravenous, Q12H  valsartan, 80 mg, Oral, Daily          Results from last 7 days   Lab Units 02/07/24  0718   WBC 10*3/mm3 8.01   HEMOGLOBIN g/dL 13.5   HEMATOCRIT % 40.3   PLATELETS 10*3/mm3 191     Results from last 7 " days   Lab Units 02/07/24  0718   SODIUM mmol/L 139   POTASSIUM mmol/L 4.3   CHLORIDE mmol/L 103   CO2 mmol/L 22.0   BUN mg/dL 12   CREATININE mg/dL 0.85   CALCIUM mg/dL 9.5   GLUCOSE mg/dL 180*     Results from last 7 days   Lab Units 02/06/24  2210   INR  1.42*     Lab Results   Component Value Date    TROPONINT 365 (C) 02/06/2024     Results from last 7 days   Lab Units 02/07/24  0718   TSH uIU/mL 0.650     Results from last 7 days   Lab Units 02/07/24  0718   CHOLESTEROL mg/dL 211*   TRIGLYCERIDES mg/dL 345*   HDL CHOL mg/dL 31*   LDL CHOL mg/dL 119*         Ferritin   Date Value Ref Range Status   09/18/2020 1,274.00 (H) 30.00 - 400.00 ng/mL Final      Hemoglobin A1C   Date Value Ref Range Status   02/06/2024 8.80 (H) 4.80 - 5.60 % Final     Magnesium   Date Value Ref Range Status   02/06/2024 1.7 1.6 - 2.6 mg/dL Final        RESULT REVIEW:    I reviewed the patient's new clinical results.    Tele: Sinus Rythym      ASSESSMENT:     Inferior STEMI s/p PTCA and stent of RCA  Hyperlipidemia  Diabetes    PLAN:     We will continue patient on aspirin and Plavix  Will check TIBC to see iron saturation  Will add SGLT2's  Patient blood pressure is running a little high we might add low-dose spironolactone  Cardiac rehab has been ordered

## 2024-02-07 NOTE — PAYOR COMM NOTE
"Ref# [id number]    Utilization Review  Phone [phone]  Fax [phone]    [place] [id number]           Francisco Javier Tameka PAUL (48 y.o. Male)       Date of Birth   1975    Social Security Number       Address   [address] [place] [address]    Home Phone   [phone]    MRN   [id number]       Alevism   Voodoo    Marital Status                               Admission Date   2/6/24    Admission Type   Emergency    Admitting Provider   Raj Lawton MD    Attending Provider   Raj Lawton MD    Department, Room/Bed   [facility] 4H, S485/1       Discharge Date       Discharge Disposition       Discharge Destination                                 Attending Provider: Raj Lawton MD    Allergies: No Known Drug Allergy    Isolation: None   Infection: None   Code Status: CPR    Ht: 177.8 cm (70\")   Wt: 112 kg (248 lb)    Admission Cmt: None   Principal Problem: STEMI involving right coronary artery [I21.11]                   Active Insurance as of 2/6/2024       Primary Coverage       Payor Plan Insurance Group Employer/Plan Group    AETNA COMMERCIAL AETNA [id number]       Payor Plan Address Payor Plan Phone Number Payor Plan Fax Number Effective Dates    [address] [phone]  11/4/2022 - None Entered    [facility] [id number]         Subscriber Name Subscriber Birth Date Member ID       FRANCISCO JAVIERTAMEKA PAUL 1975 [id number]                     Emergency Contacts        (Rel.) Home Phone Work Phone Mobile Phone    CARISSA MCINTYRE (Spouse) [phone] -- [phone]              [place]onz[place]: [facility] [id number] Tax ID [id number]  Insurance Information                  AETNA COMMERCIAL/AETNA Phone: [phone]    Subscriber: Tameka Mcintyre Subscriber#: [id number]    Group#: [id number] Precert#: --             History & Physical        Raj Lawton MD at 02/06/24 2009        "   [facility]   Consult Note    Referring Provider: [facility] emergency room  Freddie Zarco MD    Reason for Consultation: Inferior STEMI    Patient Care Team:  Freddie Zarco MD as PCP - General  Problem list:  Coronary artery disease, inferior STEMI 2/6-24  Type 2 diabetes  Hypertension  Hyperlipidemia  Obstructive sleep apnea  Gout  GERD  DVT in setting of COVID 2020.  Past Medical History:   Diagnosis Date    Hyperlipidemia     Hypertension     Sleep apnea        No past surgical history on file.      Allergies   Allergen Reactions    No Known Drug Allergy            Current Facility-Administered Medications:     clopidogrel (PLAVIX) tablet, , Oral, Code / Trauma / Sedation Medication, Lior Pablo DO, 600 mg at 02/06/24 1956    heparin (porcine) 5000 UNIT/ML injection, , , Code / Trauma / Sedation Medication, Lior Pablo DO, 6,800 Units at 02/06/24 1958    Sodium Chloride (PF) 0.9 % 10 mL, 10 mL, Intravenous, PRN, Lior Pablo DO    Current Outpatient Medications:     allopurinol (ZYLOPRIM) 100 MG tablet, , Disp: , Rfl: 0    benazepril (LOTENSIN) 20 MG tablet, Take 20 mg by mouth daily., Disp: , Rfl:     benzonatate (TESSALON) 100 MG capsule, , Disp: , Rfl:     cetirizine (ZYRTEC ALLERGY) 10 MG tablet, Zyrtec 10 mg tablet  Take 1 tablet every day by oral route., Disp: , Rfl:     Eliquis 5 MG tablet tablet, , Disp: , Rfl:     famotidine (PEPCID) 20 MG tablet, Every 12 (Twelve) Hours., Disp: , Rfl:     fluticasone (FLONASE ALLERGY RELIEF) 50 MCG/ACT nasal spray, Flonase Allergy Relief 50 mcg/actuation nasal spray,suspension  Spray 1 spray every day by intranasal route as needed for 7 days., Disp: , Rfl:   (Note patient is not on Eliquis as described above.  Is on metformin.       (Not in a hospital admission)        Subjective.   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  Approximate 630 this evening, developed a sudden onset of substernal chest pressure with nausea vomiting and bilateral hand tingling.  I called rescue squad, inferior STEMI was noted he was brought to the emergency room.  History with heparin and Plavix and brought emergently to the cardiac Cath Lab for revascularization.  He still having chest pain.      Social History     Socioeconomic History    Marital status:    Tobacco Use    Smoking status: Never    Smokeless tobacco: Never   Vaping Use    Vaping Use: Never used   Substance and Sexual Activity    Alcohol use: Yes    Drug use: Never    Sexual activity: Yes     Partners: Female     Birth control/protection: None     Comment: spouse     Family History   Problem Relation Age of Onset    Hypertension Father     Hypertension Other     Heart disease Other     Stroke Other     Diabetes Other     Alzheimer's disease Other          Review of Systems:  Review of Systems   All other systems reviewed and are negative.             Objective  Vitals:  BP (!) 207/117 (BP Location: Right arm, Patient Position: Lying)   Pulse 79   Resp 20   Ht 177.8 cm (70\")   Wt 112 kg (248 lb)   SpO2 96%   BMI 35.58 kg/m²    No intake or output data in the 24 hours ending 02/06/24 2009    Vitals reviewed.   Constitutional:       Appearance: Healthy appearance. Well-developed and not in distress.   Eyes:      Conjunctiva/sclera: Conjunctivae normal.      Pupils: Pupils are equal, round, and reactive to light.   HENT:      Head: Normocephalic and atraumatic.    Mouth/Throat:      Pharynx: Oropharynx is clear.   Neck:      Thyroid: Thyroid normal. No thyromegaly.      Vascular: Normal carotid pulses. No carotid bruit or JVD. JVD normal.      Lymphadenopathy: No cervical adenopathy.   Pulmonary:      Effort: No respiratory distress.      Breath sounds: No wheezing. No rales.   Chest:      Chest wall: Not tender to palpatation. " "  Cardiovascular:      Normal rate. Regular rhythm.      No gallop.    Pulses:     Carotid: 2+ bilaterally.     Dorsalis pedis: 2+ bilaterally.     Posterior tibial: 2+ bilaterally.  Edema:     Peripheral edema absent.   Abdominal:      General: There is no distension or abdominal bruit.      Palpations: There is no abdominal mass.      Tenderness: There is no abdominal tenderness. There is no rebound.   Musculoskeletal:         General: No tenderness or deformity.      Extremities: No clubbing present.Skin:     General: Skin is warm and dry. There is no cyanosis.      Findings: No rash.   Neurological:      Mental Status: Alert, oriented to person, place, and time and oriented to person, place and time.              Results Review:  I reviewed the patient's new clinical results.            Invalid input(s): \"LABALBU\", \"PROT\"          No results found for: \"TROPONINT\"                    Tele: Sinus rhythm    EKG: Sinus rhythm LVH acute inferior STEMI      Assessment & Plan    Inferior STEMI, ongoing chest pain  Type 2 diabetes  Hypertension  Dyslipidemia  Elevated ferritin level      Plan:    Emergent left heart catheterization plus minus revascularization.  Dual antiplatelet therapy, initiate beta-blocker high intensity statin.  Further recommendations after artery revascularization.      Raj Lawton MD      Dictated utilizing Dragon dictation    Electronically signed by Raj Lawton MD at 02/06/24 2017          Emergency Department Notes        Lior Pablo DO at 02/06/24 2003            [place]    EMERGENCY DEPARTMENT ENCOUNTER      Pt Name: Joon Mcintyre  MRN: [id number]  YOB: 1975  Date of evaluation: 2/6/2024  Provider: Lior Pablo DO    CHIEF COMPLAINT       Chief Complaint   Patient presents with    Chest Pain         HISTORY OF PRESENT ILLNESS  (Location/Symptom, Timing/Onset, Context/Setting, Quality, Duration, Modifying Factors, Severity.)   Jono Mcintyre is " a 48 y.o. male who presents to the emergency department for evaluation via EMS secondary to a concern for chest pain which occurred this evening after finishing up with a workout.  He notes he got outside after hot shower and started feeling chest pressure clammy sensation with nausea, no vomiting.  He denies any radiation of the pain or discomfort.  He states he really has not been able to find a position of comfort until arrival to the ED.  Does not have any known history of coronary disease, no prior catheterization or stent placements.  He notes he is only on medications for diabetes, hypertension and hyperlipidemia.  Denies any recent illness.  Has not had any chest pain or anginal symptoms with his exercise.  Does not take any blood thinning medications.  No fever, chills, recent illness.  No other acute systemic complaints at this time.      Nursing notes were reviewed.      PAST MEDICAL HISTORY     Past Medical History:   Diagnosis Date    Hyperlipidemia     Hypertension     Sleep apnea          SURGICAL HISTORY     No past surgical history on file.      CURRENT MEDICATIONS       Current Facility-Administered Medications:     heparin (porcine) injection, , , Code / Trauma / Sedation Medication, Raj Lawton MD, 5,000 Units at 02/06/24 2027    lidocaine PF 1% (XYLOCAINE) injection, , , Code / Trauma / Sedation Medication, Raj Lawton MD, 5 mL at 02/06/24 2024    niCARdipine HCl in NaCl (CARDENE) 1-0.9 MG/10ML-% syringe solution prefilled syringe, , , Code / Trauma / Sedation Medication, Raj Lawton MD, 200 mcg at 02/06/24 2026    nitroglycerin 100 mcg/mL in D5W syringe, , , Code / Trauma / Sedation Medication, Raj Lawton MD, 200 mcg at 02/06/24 2027    [MAR Hold] Sodium Chloride (PF) 0.9 % 10 mL, 10 mL, Intravenous, PRN, Lior Pablo DO    ALLERGIES     No known drug allergy    FAMILY HISTORY       Family History   Problem Relation Age of Onset    Hypertension Father      "Hypertension Other     Heart disease Other     Stroke Other     Diabetes Other     Alzheimer's disease Other           SOCIAL HISTORY       Social History     Socioeconomic History    Marital status:    Tobacco Use    Smoking status: Never    Smokeless tobacco: Never   Vaping Use    Vaping Use: Never used   Substance and Sexual Activity    Alcohol use: Yes    Drug use: Never    Sexual activity: Yes     Partners: Female     Birth control/protection: None     Comment: spouse         PHYSICAL EXAM    (up to 7 for level 4, 8 or more for level 5)     Vitals:    02/06/24 1953 02/06/24 2019 02/06/24 2020   BP: (!) 207/117 (!) 165/136    BP Location: Right arm     Patient Position: Lying     Pulse: 79 84    Resp: 20 16    SpO2: 96% 99% 98%   Weight: 112 kg (248 lb)     Height: 177.8 cm (70\")         Physical Exam  General : Patient is awake, alert, oriented, in mild to moderate discomfort  HEENT: Pupils are equally round, EOMI, conjunctivae clear  Neck: Neck is supple, full range of motion, trachea midline  Cardiac: Heart regular rate, rhythm, no murmurs, rubs, or gallops  Lungs: Lungs are clear to auscultation, there is no wheezing, rhonchi, or rales. There is no use of accessory muscles  Chest wall: There is no tenderness to palpation over the chest wall or over ribs  Abdomen: Abdomen is soft, nontender, nondistended. There are no firm or pulsatile masses, no rebound rigidity or guarding  Musculoskeletal: No focal muscle deficits are appreciated  Dermatology: Skin is warm and dry  Psych: Mentation is grossly normal, cognition is grossly normal. Affect is appropriate      DIAGNOSTIC RESULTS     EKG:  All EKGs are interpreted by the Emergency Department Physician who either signs or Co-signs this chart in the absence of a cardiologist.    ECG 12 Lead ED Triage Standing Order; Acute STEMI   Final Result   Test Reason : ED Triage Standing Order~   Blood Pressure :   */*   mmHG   Vent. Rate :  77 BPM     Atrial Rate :  " 77 BPM      P-R Int : 222 ms          QRS Dur :  88 ms       QT Int : 344 ms       P-R-T Axes :  31   5 124 degrees      QTc Int : 389 ms      Sinus rhythm with 1st degree AV block   Left ventricular hypertrophy with repolarization abnormality   Inferior infarct , possibly acute   ** ** ACUTE MI / STEMI ** **   Consider right ventricular involvement in acute inferior infarct   Abnormal ECG   No previous ECGs available   Confirmed by FELECIA DALE MD ([id number]) on 2/6/2024 8:16:17 PM      Referred By: EDMD           Confirmed By: FELECIA DALE MD            ED BEDSIDE ULTRASOUND:   Performed by ED Physician - none    LABS:    I have reviewed and interpreted all of the currently available lab results from this visit (if applicable):  Results for orders placed or performed during the hospital encounter of 02/06/24   CBC Auto Differential    Specimen: Blood   Result Value Ref Range    WBC 8.17 3.40 - 10.80 10*3/mm3    RBC 5.40 4.14 - 5.80 10*6/mm3    Hemoglobin 15.5 13.0 - 17.7 g/dL    Hematocrit 46.9 37.5 - 51.0 %    MCV 86.9 79.0 - 97.0 fL    MCH 28.7 26.6 - 33.0 pg    MCHC 33.0 31.5 - 35.7 g/dL    RDW 12.6 12.3 - 15.4 %    RDW-SD 39.5 37.0 - 54.0 fl    MPV 11.7 6.0 - 12.0 fL    Platelets 193 140 - 450 10*3/mm3    Neutrophil % 64.1 42.7 - 76.0 %    Lymphocyte % 27.7 19.6 - 45.3 %    Monocyte % 4.7 (L) 5.0 - 12.0 %    Eosinophil % 1.3 0.3 - 6.2 %    Basophil % 0.6 0.0 - 1.5 %    Immature Grans % 1.6 (H) 0.0 - 0.5 %    Neutrophils, Absolute 5.24 1.70 - 7.00 10*3/mm3    Lymphocytes, Absolute 2.26 0.70 - 3.10 10*3/mm3    Monocytes, Absolute 0.38 0.10 - 0.90 10*3/mm3    Eosinophils, Absolute 0.11 0.00 - 0.40 10*3/mm3    Basophils, Absolute 0.05 0.00 - 0.20 10*3/mm3    Immature Grans, Absolute 0.13 (H) 0.00 - 0.05 10*3/mm3    nRBC 0.0 0.0 - 0.2 /100 WBC   ECG 12 Lead ED Triage Standing Order; Acute STEMI   Result Value Ref Range    QT Interval 344 ms    QTC Interval 389 ms        If labs were ordered, I independently  reviewed the results and considered them in treating the patient.      EMERGENCY DEPARTMENT COURSE and DIFFERENTIAL DIAGNOSIS/MDM:   Vitals:  AS OF 20:30 EST    BP - (!) 165/136  HR - 84  TEMP -    O2 SATS - 98%      Orders placed during this visit:  Orders Placed This Encounter   Procedures    XR Chest 1 View    [place] Draw    High Sensitivity Troponin T    Magnesium    Protime-INR    CBC Auto Differential    NPO Diet NPO Type: Strict NPO    Initiate Department's Acute STEMI Process    Undress and Gown    Continuous Pulse Oximetry    Vital Signs    Consult Interventional Cardiology and Notify Cath Lab    Oxygen Therapy- Nasal Cannula; Titrate 1-6 LPM Per SpO2; 90 - 95%    POC CHEM 8    ECG 12 Lead ED Triage Standing Order; Acute STEMI    Insert Peripheral IV - Avoid RIGHT Radial Area (If Possible)    Insert 2nd Peripheral IV - Avoid RIGHT Radial Area (If Possible)    CBC & Differential    Green Top (Gel)    Lavender Top    Gold Top - SST    Gray Top    Light Blue Top       All labs have been independently reviewed by me.  All radiology studies have been reviewed by me and the radiologist dictating the report.  All EKG's have been independently viewed and interpreted by me.      Discussion below represents my analysis of pertinent findings related to patient's condition, differential diagnosis, treatment plan and final disposition.    Differential diagnosis:  The differential diagnosis associated with the patient's presentation includes: STEMI    Additional sources  Discussed/ obtained information from independent historians:   [] Spouse  [] Parent  [] Family member  [] Friend  [x] [facility], [place]   [] Other:    External (non-ED) record review:   [] Inpatient record:   [] Office record:   [] Outpatient record:   [] Prior Outpatient labs:   [] Prior Outpatient radiology:   [] Primary Care record:   [] Outside ED record:   [] Other:     Patient's care impacted by:   [x] Diabetes  [x] Hypertension  [] CHF  [x]  Hyperlipidemia  [x] Coronary Artery Disease   [] COPD   [] Cancer   [] Tobacco Abuse   [] Substance Abuse    [] Other:     Care significantly affected by Social Determinants of Health (housing and economic circumstances, unemployment)    [] Yes     [x] No   If yes, Patient's care significantly limited by Social Determinants of Health including:   [] Inadequate housing   [] Low income   [] Alcoholism and drug addiction in family   [] Problems related to primary support group   [] Unemployment   [] Problems related to employment   [] Other Social Determinants of Health:       MEDICATIONS ADMINISTERED IN ED:  Medications   Sodium Chloride (PF) 0.9 % 10 mL ( Intravenous MAR Hold 2/6/24 2019)   lidocaine PF 1% (XYLOCAINE) injection (5 mL Infiltration Given 2/6/24 2024)   niCARdipine HCl in NaCl (CARDENE) 1-0.9 MG/10ML-% syringe solution prefilled syringe (200 mcg Intra-arterial Given 2/6/24 2026)   nitroglycerin 100 mcg/mL in D5W syringe (200 mcg Intra-arterial Given 2/6/24 2027)   heparin (porcine) injection (5,000 Units Intra-arterial Given 2/6/24 2027)   clopidogrel (PLAVIX) tablet (600 mg Oral Given 2/6/24 1956)   heparin (porcine) 5000 UNIT/ML injection (6,800 Units Intravenous Given 2/6/24 1958)            48-year-old male presented with EMS, immediately seen by myself upon arrival concern for ST elevation MI.  Patient is a concerning story, chest pressure, ST elevation changes in his inferior leads with lateral reciprocal changes are noted.  He was given aspirin, Plavix, heparin bolus.  Placed on cardiac monitor, IV fluids were started.  I did discuss the case with interventional cardiologist, Dr. Lawton who will plan for cardiac catheterization, intervention as needed.  Patient stable in the emergency department, taken up to the catheterization lab for intervention.      PROCEDURES:  Procedures    CRITICAL CARE TIME    Total Critical Care time was 30 minutes, excluding separately reportable procedures.  Acute MI,  requiring multiple interventions, assessments, discussions with medics and cardiac interventionalist.  There was a high probability of clinically significant/life threatening deterioration in the patient's condition which required my urgent intervention.      FINAL IMPRESSION      1. ST elevation myocardial infarction (STEMI), unspecified artery          DISPOSITION/PLAN     ED Disposition       ED Disposition   Send to Cath Lab    Condition   --    Comment   Dr. Lawton                   Comment: Please note this report has been produced using speech recognition software.      Lior Pablo DO  Attending Emergency Physician         Lior Pablo DO  02/06/24 2030      Electronically signed by Lior Pablo DO at 02/06/24 2030       Lab Results (all)       Procedure Component Value Units Date/Time    Ferritin [[id number]]  (Abnormal) Collected: 02/07/24 0718    Specimen: Blood Updated: 02/07/24 1236     Ferritin 451.70 ng/mL     Narrative:      Results may be falsely decreased if patient taking Biotin.      Iron Profile [[id number]]  (Abnormal) Collected: 02/07/24 0718    Specimen: Blood Updated: 02/07/24 1233     Iron 66 mcg/dL      Iron Saturation (TSAT) 19 %      Transferrin 235 mg/dL      TIBC 350 mcg/dL     POC Glucose Once [[id number]]  (Abnormal) Collected: 02/07/24 1121    Specimen: Blood Updated: 02/07/24 1123     Glucose 174 mg/dL     Basic Metabolic Panel [[id number]]  (Abnormal) Collected: 02/07/24 0718    Specimen: Blood Updated: 02/07/24 0830     Glucose 180 mg/dL      BUN 12 mg/dL      Creatinine 0.85 mg/dL      Sodium 139 mmol/L      Potassium 4.3 mmol/L      Chloride 103 mmol/L      CO2 22.0 mmol/L      Calcium 9.5 mg/dL      BUN/Creatinine Ratio 14.1     Anion Gap 14.0 mmol/L      eGFR 106.5 mL/min/1.73     Narrative:      GFR Normal >60  Chronic Kidney Disease <60  Kidney Failure <15      Lipid Panel [[id number]]  (Abnormal) Collected: 02/07/24 0718    Specimen: Blood Updated:  02/07/24 0830     Total Cholesterol 211 mg/dL      Triglycerides 345 mg/dL      HDL Cholesterol 31 mg/dL      LDL Cholesterol  119 mg/dL      VLDL Cholesterol 61 mg/dL      LDL/HDL Ratio 3.58    Narrative:      Cholesterol Reference Ranges  (U.S. Department of Health and Human Services ATP III Classifications)    Desirable          <200 mg/dL  Borderline High    200-239 mg/dL  High Risk          >240 mg/dL      Triglyceride Reference Ranges  (U.S. Department of Health and Human Services ATP III Classifications)    Normal           <150 mg/dL  Borderline High  150-199 mg/dL  High             200-499 mg/dL  Very High        >500 mg/dL    HDL Reference Ranges  (U.S. Department of Health and Human Services ATP III Classifications)    Low     <40 mg/dl (major risk factor for CHD)  High    >60 mg/dl ('negative' risk factor for CHD)        LDL Reference Ranges  (U.S. Department of Health and Human Services ATP III Classifications)    Optimal          <100 mg/dL  Near Optimal     100-129 mg/dL  Borderline High  130-159 mg/dL  High             160-189 mg/dL  Very High        >189 mg/dL    TSH [[id number]]  (Normal) Collected: 02/07/24 0718    Specimen: Blood Updated: 02/07/24 0830     TSH 0.650 uIU/mL     CBC (No Diff) [[id number]]  (Normal) Collected: 02/07/24 0718    Specimen: Blood Updated: 02/07/24 0812     WBC 8.01 10*3/mm3      RBC 4.73 10*6/mm3      Hemoglobin 13.5 g/dL      Hematocrit 40.3 %      MCV 85.2 fL      MCH 28.5 pg      MCHC 33.5 g/dL      RDW 12.5 %      RDW-SD 38.6 fl      MPV 11.3 fL      Platelets 191 10*3/mm3     POC Glucose Once [[id number]]  (Abnormal) Collected: 02/07/24 0724    Specimen: Blood Updated: 02/07/24 0725     Glucose 173 mg/dL     Hemoglobin A1c [[id number]]  (Abnormal) Collected: 02/06/24 2004    Specimen: Blood Updated: 02/07/24 0417     Hemoglobin A1C 8.80 %     Narrative:      Hemoglobin A1C Ranges:    Increased Risk for Diabetes  5.7% to 6.4%  Diabetes                     >=  6.5%  Diabetic Goal                < 7.0%    [place] Draw [[id number]] Collected: 02/06/24 2004    Specimen: Blood Updated: 02/07/24 0156    Narrative:      The following orders were created for panel order [place] Draw.  Procedure                               Abnormality         Status                     ---------                               -----------         ------                     Green Top (Gel)[[id number]]                                  Final result               Lavender Top[[id number]]                                     Final result               Gold Top - SST[[id number]]                                                              [place] Top[[id number]]                                         Final result               Light Blue Top[[id number]]                                   Final result                 Please view results for these tests on the individual orders.    Gray Top [[id number]] Collected: 02/06/24 2004    Specimen: Blood Updated: 02/07/24 0015     Extra Tube Hold for add-ons.     Comment: Auto resulted.       Green Top (Gel) [[id number]] Collected: 02/06/24 2210    Specimen: Blood Updated: 02/06/24 2316     Extra Tube Hold for add-ons.     Comment: Auto resulted.       Light Blue Top [[id number]] Collected: 02/06/24 2210    Specimen: Blood Updated: 02/06/24 2316     Extra Tube Hold for add-ons.     Comment: Auto resulted       Protime-INR [[id number]]  (Abnormal) Collected: 02/06/24 2210    Specimen: Blood Updated: 02/06/24 2307     Protime 17.5 Seconds      INR 1.42    High Sensitivity Troponin T [[id number]]  (Abnormal) Collected: 02/06/24 2210    Specimen: Blood Updated: 02/06/24 2303     HS Troponin T 365 ng/L     Narrative:      High Sensitive Troponin T Reference Range:  <14.0 ng/L- Negative Female for AMI  <22.0 ng/L- Negative Male for AMI  >=14 - Abnormal Female indicating possible myocardial injury.  >=22 - Abnormal Male indicating possible myocardial injury.   Clinicians would have  to utilize clinical acumen, EKG, Troponin, and serial changes to determine if it is an Acute Myocardial Infarction or myocardial injury due to an underlying chronic condition.         Magnesium [[id number]]  (Normal) Collected: 02/06/24 2210    Specimen: Blood Updated: 02/06/24 2259     Magnesium 1.7 mg/dL     Lavender Top [[id number]] Collected: 02/06/24 2004    Specimen: Blood Updated: 02/06/24 2117     Extra Tube hold for add-on     Comment: Auto resulted       CBC & Differential [[id number]]  (Abnormal) Collected: 02/06/24 2004    Specimen: Blood Updated: 02/06/24 2022    Narrative:      The following orders were created for panel order CBC & Differential.  Procedure                               Abnormality         Status                     ---------                               -----------         ------                     CBC Auto Differential[[id number]]        Abnormal            Final result                 Please view results for these tests on the individual orders.    CBC Auto Differential [[id number]]  (Abnormal) Collected: 02/06/24 2004    Specimen: Blood Updated: 02/06/24 2022     WBC 8.17 10*3/mm3      RBC 5.40 10*6/mm3      Hemoglobin 15.5 g/dL      Hematocrit 46.9 %      MCV 86.9 fL      MCH 28.7 pg      MCHC 33.0 g/dL      RDW 12.6 %      RDW-SD 39.5 fl      MPV 11.7 fL      Platelets 193 10*3/mm3      Neutrophil % 64.1 %      Lymphocyte % 27.7 %      Monocyte % 4.7 %      Eosinophil % 1.3 %      Basophil % 0.6 %      Immature Grans % 1.6 %      Neutrophils, Absolute 5.24 10*3/mm3      Lymphocytes, Absolute 2.26 10*3/mm3      Monocytes, Absolute 0.38 10*3/mm3      Eosinophils, Absolute 0.11 10*3/mm3      Basophils, Absolute 0.05 10*3/mm3      Immature Grans, Absolute 0.13 10*3/mm3      nRBC 0.0 /100 WBC           Imaging Results (All)       Procedure Component Value Units Date/Time    XR Chest 1 View [[id number]] Collected: 02/06/24 2204     Updated: 02/06/24 2208    Narrative:      XR CHEST 1  "VW    Date of Exam: 2/6/2024 8:41 PM CST    Indication: Acute STEMI traige protocol    Comparison: Chest CT 9/13/2020    Findings:  Cardiomediastinal silhouette is unremarkable.  No airspace disease, pneumothorax, nor pleural effusion. No acute osseous abnormality identified.      Impression:      Impression:  No acute process identified.      Electronically Signed: Carmelo Odom MD    2/6/2024 9:04 PM CST    Workstation ID: [id number]          Operative/Procedure Notes (all)    No notes of this type exist for this encounter.          Physician Progress Notes (all)        Maria G Ureña MD at 02/07/24 0839          [facility]  IP Progress Note    PROBLEM LIST:  Inferior ST segment elevation MI s/p PTCA and stent RCA  Hypertension  Hyperlipidemia  Increased ferritin      HOSPITAL COURSE:  Patient was admitted with inferior ST segment elevation MI and undergone successful revascularization.      CHIEF COMPLAINTS:  Chest pain      Subjective   Patient is doing fine with no symptoms.      Objective     Blood pressure (!) 169/114, pulse 66, temperature 97.7 °F (36.5 °C), temperature source Oral, resp. rate 16, height 177.8 cm (70\"), weight 112 kg (248 lb), SpO2 96%.     Intake/Output Summary (Last 24 hours) at 2/7/2024 0839  Last data filed at 2/7/2024 0700  Gross per 24 hour   Intake --   Output 1530 ml   Net -1530 ml       PHYSICAL EXAM:  Constitutional:       General: Awake and alert     Appearance: Healthy appearance    Neck:     JVP: No JVP     Carotid artery: No carotid bruit    Pulmonary:      Effort: Pulmonary effort is normal.      Breath sounds: Good breath sounds    Cardiovascular:      Normal rate. Regular rhythm. Normal S1. Normal S2.      Murmurs: There is no murmur.      No gallop. No click. No rub.     Abdominal:      General: Bowel sounds are normal.      Palpations: Abdomen is soft.      Tenderness: There is no abdominal tenderness.    Extremities:     Pulses: Good " pulses, cath site looks good     Edema: No edema        MEDICATIONS:    aspirin, 81 mg, Oral, Daily  clopidogrel, 75 mg, Oral, Daily  heparin (porcine), 5,000 Units, Subcutaneous, Q12H  insulin lispro, 2-7 Units, Subcutaneous, 4x Daily AC & at Bedtime  metoprolol succinate XL, 25 mg, Oral, Q24H  rosuvastatin, 20 mg, Oral, Nightly  senna-docusate sodium, 2 tablet, Oral, BID  sodium chloride, 10 mL, Intravenous, Q12H  valsartan, 80 mg, Oral, Daily          Results from last 7 days   Lab Units 02/07/24  0718   WBC 10*3/mm3 8.01   HEMOGLOBIN g/dL 13.5   HEMATOCRIT % 40.3   PLATELETS 10*3/mm3 191     Results from last 7 days   Lab Units 02/07/24  0718   SODIUM mmol/L 139   POTASSIUM mmol/L 4.3   CHLORIDE mmol/L 103   CO2 mmol/L 22.0   BUN mg/dL 12   CREATININE mg/dL 0.85   CALCIUM mg/dL 9.5   GLUCOSE mg/dL 180*     Results from last 7 days   Lab Units 02/06/24  2210   INR  1.42*     Lab Results   Component Value Date    TROPONINT 365 (C) 02/06/2024     Results from last 7 days   Lab Units 02/07/24  0718   TSH uIU/mL 0.650     Results from last 7 days   Lab Units 02/07/24  0718   CHOLESTEROL mg/dL 211*   TRIGLYCERIDES mg/dL 345*   HDL CHOL mg/dL 31*   LDL CHOL mg/dL 119*         Ferritin   Date Value Ref Range Status   09/18/2020 1,274.00 (H) 30.00 - 400.00 ng/mL Final      Hemoglobin A1C   Date Value Ref Range Status   02/06/2024 8.80 (H) 4.80 - 5.60 % Final     Magnesium   Date Value Ref Range Status   02/06/2024 1.7 1.6 - 2.6 mg/dL Final        RESULT REVIEW:    I reviewed the patient's new clinical results.    Tele: Sinus Rythym      ASSESSMENT:     Inferior STEMI s/p PTCA and stent of RCA  Hyperlipidemia  Diabetes    PLAN:     We will continue patient on aspirin and Plavix  Will check TIBC to see iron saturation  Will add SGLT2's  Patient blood pressure is running a little high we might add low-dose spironolactone  Cardiac rehab has been ordered    Electronically signed by Maria G Ureña MD at 02/07/24 0937        Consult Notes (all)    No notes of this type exist for this encounter.

## 2024-02-07 NOTE — ED PROVIDER NOTES
[place]    EMERGENCY DEPARTMENT ENCOUNTER      Pt Name: Jono Mcintyre  MRN: [id number]  YOB: 1975  Date of evaluation: 2/6/2024  Provider: Lior Pablo DO    CHIEF COMPLAINT       Chief Complaint   Patient presents with    Chest Pain         HISTORY OF PRESENT ILLNESS  (Location/Symptom, Timing/Onset, Context/Setting, Quality, Duration, Modifying Factors, Severity.)   Jono Mcintyre is a 48 y.o. male who presents to the emergency department for evaluation via EMS secondary to a concern for chest pain which occurred this evening after finishing up with a workout.  He notes he got outside after hot shower and started feeling chest pressure clammy sensation with nausea, no vomiting.  He denies any radiation of the pain or discomfort.  He states he really has not been able to find a position of comfort until arrival to the ED.  Does not have any known history of coronary disease, no prior catheterization or stent placements.  He notes he is only on medications for diabetes, hypertension and hyperlipidemia.  Denies any recent illness.  Has not had any chest pain or anginal symptoms with his exercise.  Does not take any blood thinning medications.  No fever, chills, recent illness.  No other acute systemic complaints at this time.      Nursing notes were reviewed.      PAST MEDICAL HISTORY     Past Medical History:   Diagnosis Date    Hyperlipidemia     Hypertension     Sleep apnea          SURGICAL HISTORY     No past surgical history on file.      CURRENT MEDICATIONS       Current Facility-Administered Medications:     heparin (porcine) injection, , , Code / Trauma / Sedation Medication, Raj Lawton MD, 5,000 Units at 02/06/24 2027    lidocaine PF 1% (XYLOCAINE) injection, , , Code / Trauma / Sedation Medication, Raj Lawton MD, 5 mL at 02/06/24 2024    niCARdipine HCl in NaCl (CARDENE) 1-0.9 MG/10ML-% syringe solution prefilled syringe, , , Code / Trauma / Sedation Medication,  "Raj Lawton MD, 200 mcg at 02/06/24 2026    nitroglycerin 100 mcg/mL in D5W syringe, , , Code / Trauma / Sedation Medication, Raj Lawton MD, 200 mcg at 02/06/24 2027    [MAR Hold] Sodium Chloride (PF) 0.9 % 10 mL, 10 mL, Intravenous, PRN, Lior Pablo, DO    ALLERGIES     No known drug allergy    FAMILY HISTORY       Family History   Problem Relation Age of Onset    Hypertension Father     Hypertension Other     Heart disease Other     Stroke Other     Diabetes Other     Alzheimer's disease Other           SOCIAL HISTORY       Social History     Socioeconomic History    Marital status:    Tobacco Use    Smoking status: Never    Smokeless tobacco: Never   Vaping Use    Vaping Use: Never used   Substance and Sexual Activity    Alcohol use: Yes    Drug use: Never    Sexual activity: Yes     Partners: Female     Birth control/protection: None     Comment: spouse         PHYSICAL EXAM    (up to 7 for level 4, 8 or more for level 5)     Vitals:    02/06/24 1953 02/06/24 2019 02/06/24 2020   BP: (!) 207/117 (!) 165/136    BP Location: Right arm     Patient Position: Lying     Pulse: 79 84    Resp: 20 16    SpO2: 96% 99% 98%   Weight: 112 kg (248 lb)     Height: 177.8 cm (70\")         Physical Exam  General : Patient is awake, alert, oriented, in mild to moderate discomfort  HEENT: Pupils are equally round, EOMI, conjunctivae clear  Neck: Neck is supple, full range of motion, trachea midline  Cardiac: Heart regular rate, rhythm, no murmurs, rubs, or gallops  Lungs: Lungs are clear to auscultation, there is no wheezing, rhonchi, or rales. There is no use of accessory muscles  Chest wall: There is no tenderness to palpation over the chest wall or over ribs  Abdomen: Abdomen is soft, nontender, nondistended. There are no firm or pulsatile masses, no rebound rigidity or guarding  Musculoskeletal: No focal muscle deficits are appreciated  Dermatology: Skin is warm and dry  Psych: Mentation is grossly " normal, cognition is grossly normal. Affect is appropriate      DIAGNOSTIC RESULTS     EKG:  All EKGs are interpreted by the Emergency Department Physician who either signs or Co-signs this chart in the absence of a cardiologist.    ECG 12 Lead ED Triage Standing Order; Acute STEMI   Final Result   Test Reason : ED Triage Standing Order~   Blood Pressure :   */*   mmHG   Vent. Rate :  77 BPM     Atrial Rate :  77 BPM      P-R Int : 222 ms          QRS Dur :  88 ms       QT Int : 344 ms       P-R-T Axes :  31   5 124 degrees      QTc Int : 389 ms      Sinus rhythm with 1st degree AV block   Left ventricular hypertrophy with repolarization abnormality   Inferior infarct , possibly acute   ** ** ACUTE MI / STEMI ** **   Consider right ventricular involvement in acute inferior infarct   Abnormal ECG   No previous ECGs available   Confirmed by FELECIA DALE MD ([id number]) on 2/6/2024 8:16:17 PM      Referred By: EDMD           Confirmed By: FELECIA DALE MD            ED BEDSIDE ULTRASOUND:   Performed by ED Physician - none    LABS:    I have reviewed and interpreted all of the currently available lab results from this visit (if applicable):  Results for orders placed or performed during the hospital encounter of 02/06/24   CBC Auto Differential    Specimen: Blood   Result Value Ref Range    WBC 8.17 3.40 - 10.80 10*3/mm3    RBC 5.40 4.14 - 5.80 10*6/mm3    Hemoglobin 15.5 13.0 - 17.7 g/dL    Hematocrit 46.9 37.5 - 51.0 %    MCV 86.9 79.0 - 97.0 fL    MCH 28.7 26.6 - 33.0 pg    MCHC 33.0 31.5 - 35.7 g/dL    RDW 12.6 12.3 - 15.4 %    RDW-SD 39.5 37.0 - 54.0 fl    MPV 11.7 6.0 - 12.0 fL    Platelets 193 140 - 450 10*3/mm3    Neutrophil % 64.1 42.7 - 76.0 %    Lymphocyte % 27.7 19.6 - 45.3 %    Monocyte % 4.7 (L) 5.0 - 12.0 %    Eosinophil % 1.3 0.3 - 6.2 %    Basophil % 0.6 0.0 - 1.5 %    Immature Grans % 1.6 (H) 0.0 - 0.5 %    Neutrophils, Absolute 5.24 1.70 - 7.00 10*3/mm3    Lymphocytes, Absolute 2.26 0.70 - 3.10  10*3/mm3    Monocytes, Absolute 0.38 0.10 - 0.90 10*3/mm3    Eosinophils, Absolute 0.11 0.00 - 0.40 10*3/mm3    Basophils, Absolute 0.05 0.00 - 0.20 10*3/mm3    Immature Grans, Absolute 0.13 (H) 0.00 - 0.05 10*3/mm3    nRBC 0.0 0.0 - 0.2 /100 WBC   ECG 12 Lead ED Triage Standing Order; Acute STEMI   Result Value Ref Range    QT Interval 344 ms    QTC Interval 389 ms        If labs were ordered, I independently reviewed the results and considered them in treating the patient.      EMERGENCY DEPARTMENT COURSE and DIFFERENTIAL DIAGNOSIS/MDM:   Vitals:  AS OF 20:30 EST    BP - (!) 165/136  HR - 84  TEMP -    O2 SATS - 98%      Orders placed during this visit:  Orders Placed This Encounter   Procedures    XR Chest 1 View    Chloride Draw    High Sensitivity Troponin T    Magnesium    Protime-INR    CBC Auto Differential    NPO Diet NPO Type: Strict NPO    Initiate Department's Acute STEMI Process    Undress and Gown    Continuous Pulse Oximetry    Vital Signs    Consult Interventional Cardiology and Notify Cath Lab    Oxygen Therapy- Nasal Cannula; Titrate 1-6 LPM Per SpO2; 90 - 95%    POC CHEM 8    ECG 12 Lead ED Triage Standing Order; Acute STEMI    Insert Peripheral IV - Avoid RIGHT Radial Area (If Possible)    Insert 2nd Peripheral IV - Avoid RIGHT Radial Area (If Possible)    CBC & Differential    Green Top (Gel)    Lavender Top    Gold Top - SST    Gray Top    Light Blue Top       All labs have been independently reviewed by me.  All radiology studies have been reviewed by me and the radiologist dictating the report.  All EKG's have been independently viewed and interpreted by me.      Discussion below represents my analysis of pertinent findings related to patient's condition, differential diagnosis, treatment plan and final disposition.    Differential diagnosis:  The differential diagnosis associated with the patient's presentation includes: STEMI    Additional sources  Discussed/ obtained information from  independent historians:   [] Spouse  [] Parent  [] Family member  [] Friend  [x] EMS, [place]   [] Other:    External (non-ED) record review:   [] Inpatient record:   [] Office record:   [] Outpatient record:   [] Prior Outpatient labs:   [] Prior Outpatient radiology:   [] Primary Care record:   [] Outside ED record:   [] Other:     Patient's care impacted by:   [x] Diabetes  [x] Hypertension  [] CHF  [x] Hyperlipidemia  [x] Coronary Artery Disease   [] COPD   [] Cancer   [] Tobacco Abuse   [] Substance Abuse    [] Other:     Care significantly affected by Social Determinants of Health (housing and economic circumstances, unemployment)    [] Yes     [x] No   If yes, Patient's care significantly limited by Social Determinants of Health including:   [] Inadequate housing   [] Low income   [] Alcoholism and drug addiction in family   [] Problems related to primary support group   [] Unemployment   [] Problems related to employment   [] Other Social Determinants of Health:       MEDICATIONS ADMINISTERED IN ED:  Medications   Sodium Chloride (PF) 0.9 % 10 mL ( Intravenous MAR Hold 2/6/24 2019)   lidocaine PF 1% (XYLOCAINE) injection (5 mL Infiltration Given 2/6/24 2024)   niCARdipine HCl in NaCl (CARDENE) 1-0.9 MG/10ML-% syringe solution prefilled syringe (200 mcg Intra-arterial Given 2/6/24 2026)   nitroglycerin 100 mcg/mL in D5W syringe (200 mcg Intra-arterial Given 2/6/24 2027)   heparin (porcine) injection (5,000 Units Intra-arterial Given 2/6/24 2027)   clopidogrel (PLAVIX) tablet (600 mg Oral Given 2/6/24 1956)   heparin (porcine) 5000 UNIT/ML injection (6,800 Units Intravenous Given 2/6/24 1958)            48-year-old male presented with EMS, immediately seen by myself upon arrival concern for ST elevation MI.  Patient is a concerning story, chest pressure, ST elevation changes in his inferior leads with lateral reciprocal changes are noted.  He was given aspirin, Plavix, heparin bolus.  Placed on cardiac  monitor, IV fluids were started.  I did discuss the case with interventional cardiologist, Dr. Lawton who will plan for cardiac catheterization, intervention as needed.  Patient stable in the emergency department, taken up to the catheterization lab for intervention.      PROCEDURES:  Procedures    CRITICAL CARE TIME    Total Critical Care time was 30 minutes, excluding separately reportable procedures.  Acute MI, requiring multiple interventions, assessments, discussions with medics and cardiac interventionalist.  There was a high probability of clinically significant/life threatening deterioration in the patient's condition which required my urgent intervention.      FINAL IMPRESSION      1. ST elevation myocardial infarction (STEMI), unspecified artery          DISPOSITION/PLAN     ED Disposition       ED Disposition   Send to Cath Lab    Condition   --    Comment   Dr. Lawton                   Comment: Please note this report has been produced using speech recognition software.      Lior Pablo DO  Attending Emergency Physician         Lior Pablo,   02/06/24 2030

## 2024-02-07 NOTE — H&P
[facility] at [facility]   Consult Note    Referring Provider: [facility], [place] emergency room  Freddie Zarco MD    Reason for Consultation: Inferior STEMI    Patient Care Team:  Freddie Zarco MD as PCP - General  Problem list:  Coronary artery disease, inferior STEMI 2/6-24  Type 2 diabetes  Hypertension  Hyperlipidemia  Obstructive sleep apnea  Gout  GERD  DVT in setting of COVID 2020.  Past Medical History:   Diagnosis Date    Hyperlipidemia     Hypertension     Sleep apnea        No past surgical history on file.      Allergies   Allergen Reactions    No Known Drug Allergy            Current Facility-Administered Medications:     clopidogrel (PLAVIX) tablet, , Oral, Code / Trauma / Sedation Medication, Lior Pablo DO, 600 mg at 02/06/24 1956    heparin (porcine) 5000 UNIT/ML injection, , , Code / Trauma / Sedation Medication, Lior Pablo DO, 6,800 Units at 02/06/24 1958    Sodium Chloride (PF) 0.9 % 10 mL, 10 mL, Intravenous, PRN, Lior Pablo DO    Current Outpatient Medications:     allopurinol (ZYLOPRIM) 100 MG tablet, , Disp: , Rfl: 0    benazepril (LOTENSIN) 20 MG tablet, Take 20 mg by mouth daily., Disp: , Rfl:     benzonatate (TESSALON) 100 MG capsule, , Disp: , Rfl:     cetirizine (ZYRTEC ALLERGY) 10 MG tablet, Zyrtec 10 mg tablet  Take 1 tablet every day by oral route., Disp: , Rfl:     Eliquis 5 MG tablet tablet, , Disp: , Rfl:     famotidine (PEPCID) 20 MG tablet, Every 12 (Twelve) Hours., Disp: , Rfl:     fluticasone (FLONASE ALLERGY RELIEF) 50 MCG/ACT nasal spray, Flonase Allergy Relief 50 mcg/actuation nasal spray,suspension  Spray 1 spray every day by intranasal route as needed for 7 days., Disp: , Rfl:   (Note patient is not on Eliquis as described above.  Is on metformin.       (Not in a hospital admission)        Subjective .   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  Approximate 630 this evening, developed a sudden onset of substernal chest pressure with nausea vomiting and bilateral hand tingling.  I called rescue squad, inferior STEMI was noted he was brought to the emergency room.  History with heparin and Plavix and brought emergently to the cardiac Cath Lab for revascularization.  He still having chest pain.      Social History     Socioeconomic History    Marital status:    Tobacco Use    Smoking status: Never    Smokeless tobacco: Never   Vaping Use    Vaping Use: Never used   Substance and Sexual Activity    Alcohol use: Yes    Drug use: Never    Sexual activity: Yes     Partners: Female     Birth control/protection: None     Comment: spouse     Family History   Problem Relation Age of Onset    Hypertension Father     Hypertension Other     Heart disease Other     Stroke Other     Diabetes Other     Alzheimer's disease Other          Review of Systems:  Review of Systems   All other systems reviewed and are negative.             Objective   Vitals:  BP (!) 207/117 (BP Location: Right arm, Patient Position: Lying)   Pulse 79   Resp 20   Ht 177.8 cm (70\")   Wt 112 kg (248 lb)   SpO2 96%   BMI 35.58 kg/m²    No intake or output data in the 24 hours ending 02/06/24 2009    Vitals reviewed.   Constitutional:       Appearance: Healthy appearance. Well-developed and not in distress.   Eyes:      Conjunctiva/sclera: Conjunctivae normal.      Pupils: Pupils are equal, round, and reactive to light.   HENT:      Head: Normocephalic and atraumatic.    Mouth/Throat:      Pharynx: Oropharynx is clear.   Neck:      Thyroid: Thyroid normal. No thyromegaly.      Vascular: Normal carotid pulses. No carotid bruit or JVD. JVD normal.      Lymphadenopathy: No cervical adenopathy.   Pulmonary:      Effort: No respiratory distress.      Breath sounds: No wheezing. No rales.   Chest:      Chest wall: Not tender to palpatation. " "  Cardiovascular:      Normal rate. Regular rhythm.      No gallop.    Pulses:     Carotid: 2+ bilaterally.     Dorsalis pedis: 2+ bilaterally.     Posterior tibial: 2+ bilaterally.  Edema:     Peripheral edema absent.   Abdominal:      General: There is no distension or abdominal bruit.      Palpations: There is no abdominal mass.      Tenderness: There is no abdominal tenderness. There is no rebound.   Musculoskeletal:         General: No tenderness or deformity.      Extremities: No clubbing present.Skin:     General: Skin is warm and dry. There is no cyanosis.      Findings: No rash.   Neurological:      Mental Status: Alert, oriented to person, place, and time and oriented to person, place and time.              Results Review:  I reviewed the patient's new clinical results.            Invalid input(s): \"LABALBU\", \"PROT\"          No results found for: \"TROPONINT\"                    Tele: Sinus rhythm    EKG: Sinus rhythm LVH acute inferior STEMI      Assessment & Plan     Inferior STEMI, ongoing chest pain  Type 2 diabetes  Hypertension  Dyslipidemia  Elevated ferritin level      Plan:    Emergent left heart catheterization plus minus revascularization.  Dual antiplatelet therapy, initiate beta-blocker high intensity statin.  Further recommendations after artery revascularization.      Raj Lawton MD      Dictated utilizing Dragon dictation  "

## 2024-02-07 NOTE — PROGRESS NOTES
Pt. Referred for Phase II Cardiac Rehab. Staff discussed benefits of exercise, program protocol, and educational material provided. Teach back verified.  Patient scheduled for orientation at [facility] on Tuesday, February 13th 2024 at 9:00am.

## 2024-02-08 ENCOUNTER — TELEPHONE (OUTPATIENT)
Dept: CARDIOLOGY | Facility: CLINIC | Age: 49
End: 2024-02-08
Payer: COMMERCIAL

## 2024-02-08 ENCOUNTER — READMISSION MANAGEMENT (OUTPATIENT)
Dept: CALL CENTER | Facility: HOSPITAL | Age: 49
End: 2024-02-08
Payer: COMMERCIAL

## 2024-02-08 VITALS
HEIGHT: 70 IN | HEART RATE: 72 BPM | RESPIRATION RATE: 18 BRPM | TEMPERATURE: 97.6 F | DIASTOLIC BLOOD PRESSURE: 94 MMHG | BODY MASS INDEX: 35.07 KG/M2 | OXYGEN SATURATION: 96 % | SYSTOLIC BLOOD PRESSURE: 139 MMHG | WEIGHT: 245 LBS

## 2024-02-08 LAB
ANION GAP SERPL CALCULATED.3IONS-SCNC: 10 MMOL/L (ref 5–15)
BUN SERPL-MCNC: 14 MG/DL (ref 6–20)
BUN/CREAT SERPL: 12.1 (ref 7–25)
CALCIUM SPEC-SCNC: 10.2 MG/DL (ref 8.6–10.5)
CHLORIDE SERPL-SCNC: 97 MMOL/L (ref 98–107)
CO2 SERPL-SCNC: 26 MMOL/L (ref 22–29)
CREAT SERPL-MCNC: 1.16 MG/DL (ref 0.76–1.27)
DEPRECATED RDW RBC AUTO: 41.2 FL (ref 37–54)
EGFRCR SERPLBLD CKD-EPI 2021: 77.2 ML/MIN/1.73
ERYTHROCYTE [DISTWIDTH] IN BLOOD BY AUTOMATED COUNT: 12.8 % (ref 12.3–15.4)
GLUCOSE BLDC GLUCOMTR-MCNC: 146 MG/DL (ref 70–130)
GLUCOSE BLDC GLUCOMTR-MCNC: 147 MG/DL (ref 70–130)
GLUCOSE SERPL-MCNC: 212 MG/DL (ref 65–99)
HCT VFR BLD AUTO: 47.2 % (ref 37.5–51)
HGB BLD-MCNC: 15.7 G/DL (ref 13–17.7)
MCH RBC QN AUTO: 29.4 PG (ref 26.6–33)
MCHC RBC AUTO-ENTMCNC: 33.3 G/DL (ref 31.5–35.7)
MCV RBC AUTO: 88.4 FL (ref 79–97)
PLATELET # BLD AUTO: 201 10*3/MM3 (ref 140–450)
PMV BLD AUTO: 11.1 FL (ref 6–12)
POTASSIUM SERPL-SCNC: 4 MMOL/L (ref 3.5–5.2)
RBC # BLD AUTO: 5.34 10*6/MM3 (ref 4.14–5.8)
SODIUM SERPL-SCNC: 133 MMOL/L (ref 136–145)
WBC NRBC COR # BLD AUTO: 7.98 10*3/MM3 (ref 3.4–10.8)

## 2024-02-08 PROCEDURE — 99231 SBSQ HOSP IP/OBS SF/LOW 25: CPT | Performed by: STUDENT IN AN ORGANIZED HEALTH CARE EDUCATION/TRAINING PROGRAM

## 2024-02-08 PROCEDURE — 25010000002 HEPARIN (PORCINE) PER 1000 UNITS: Performed by: INTERNAL MEDICINE

## 2024-02-08 PROCEDURE — 82948 REAGENT STRIP/BLOOD GLUCOSE: CPT

## 2024-02-08 PROCEDURE — 99238 HOSP IP/OBS DSCHRG MGMT 30/<: CPT | Performed by: NURSE PRACTITIONER

## 2024-02-08 PROCEDURE — 85027 COMPLETE CBC AUTOMATED: CPT | Performed by: NURSE PRACTITIONER

## 2024-02-08 PROCEDURE — 80048 BASIC METABOLIC PNL TOTAL CA: CPT | Performed by: NURSE PRACTITIONER

## 2024-02-08 RX ORDER — ROSUVASTATIN CALCIUM 20 MG/1
20 TABLET, COATED ORAL NIGHTLY
Qty: 30 TABLET | Refills: 11 | Status: SHIPPED | OUTPATIENT
Start: 2024-02-08

## 2024-02-08 RX ORDER — CLOPIDOGREL BISULFATE 75 MG/1
75 TABLET ORAL DAILY
Qty: 30 TABLET | Refills: 11 | Status: SHIPPED | OUTPATIENT
Start: 2024-02-09

## 2024-02-08 RX ORDER — METOPROLOL SUCCINATE 25 MG/1
25 TABLET, EXTENDED RELEASE ORAL
Qty: 30 TABLET | Refills: 11 | Status: SHIPPED | OUTPATIENT
Start: 2024-02-09

## 2024-02-08 RX ORDER — NITROGLYCERIN 0.4 MG/1
0.4 TABLET SUBLINGUAL
Qty: 100 TABLET | Refills: 2 | Status: SHIPPED | OUTPATIENT
Start: 2024-02-08

## 2024-02-08 RX ORDER — ASPIRIN 81 MG/1
81 TABLET ORAL DAILY
Qty: 30 TABLET | Refills: 11 | Status: SHIPPED | OUTPATIENT
Start: 2024-02-09

## 2024-02-08 RX ADMIN — Medication 10 ML: at 09:02

## 2024-02-08 RX ADMIN — SPIRONOLACTONE 12.5 MG: 25 TABLET ORAL at 09:01

## 2024-02-08 RX ADMIN — CLOPIDOGREL BISULFATE 75 MG: 75 TABLET ORAL at 09:01

## 2024-02-08 RX ADMIN — METOPROLOL SUCCINATE 25 MG: 25 TABLET, EXTENDED RELEASE ORAL at 09:00

## 2024-02-08 RX ADMIN — HEPARIN SODIUM 5000 UNITS: 5000 INJECTION INTRAVENOUS; SUBCUTANEOUS at 09:02

## 2024-02-08 RX ADMIN — EMPAGLIFLOZIN 10 MG: 10 TABLET, FILM COATED ORAL at 09:00

## 2024-02-08 RX ADMIN — ASPIRIN 81 MG: 81 TABLET, COATED ORAL at 09:00

## 2024-02-08 RX ADMIN — VALSARTAN 80 MG: 160 TABLET, FILM COATED ORAL at 09:01

## 2024-02-08 NOTE — DISCHARGE SUMMARY
"Date of Discharge:  2/8/2024  Date of Admit: 2/6/2024    Freddie Zarco MD      Discharge Diagnosis:  STEMI involving right coronary artery    Type 2 diabetes mellitus without complication, without long-term current use of insulin      Hospital Course:   Patient is a 48-year-old male who was admitted on 2/6/2024 secondary to inferior STEMI.  He underwent emergent cardiac catheterization with subsequent intervention as documented below.  Patient remained in overall stable condition.  Had echocardiogram with normal LVEF and normal valves.  Patient remained angina free.  On 2/8/2024 it was felt that he had reached maximum benefit from his stay in the hospital and was ready for discharge home.    Procedures Performed  Procedure(s):  Left Heart Cath    Acute inferior STEMI and occluded RCA.  Treated 3.5 x 28 Xience ALEC postdilated 4.0 NC trek.    30% distal RCA, mid LAD, and mid LCx.    LVEF 65%       Consults       Date and Time Order Name Status Description    2/6/2024  9:31 PM Inpatient Hospitalist Consult Completed     2/6/2024  7:50 PM Consult Interventional Cardiology and Notify Cath Lab              Pertinent Test Results:     Hemoglobin A1c 8.8  .      Discharge Physical Exam:  /94   Pulse 71   Temp 97.6 °F (36.4 °C) (Oral)   Resp 18   Ht 177.8 cm (70\")   Wt 111 kg (245 lb)   SpO2 96%   BMI 35.15 kg/m²     Vitals reviewed.   Constitutional:       Appearance: Healthy appearance. Well-developed and not in distress.   Neck:      Vascular: No JVD.      Trachea: No tracheal deviation.   Pulmonary:      Effort: Pulmonary effort is normal.      Breath sounds: Normal breath sounds.   Cardiovascular:      Normal rate. Regular rhythm.      Murmurs: There is no murmur.      Comments: Right radial access site benign  Pulses:     Intact distal pulses.   Edema:     Peripheral edema absent.   Musculoskeletal:         General: No deformity. Skin:     General: Skin is warm and dry.   Neurological:      " Mental Status: Alert and oriented to person, place, and time.           Discharge Medications     Discharge Medications        New Medications        Instructions Start Date   aspirin 81 MG EC tablet   81 mg, Oral, Daily   Start Date: February 9, 2024     clopidogrel 75 MG tablet  Commonly known as: PLAVIX   75 mg, Oral, Daily   Start Date: February 9, 2024     empagliflozin 10 MG tablet tablet  Commonly known as: JARDIANCE   10 mg, Oral, Daily   Start Date: February 9, 2024     metoprolol succinate XL 25 MG 24 hr tablet  Commonly known as: TOPROL-XL   25 mg, Oral, Every 24 Hours Scheduled   Start Date: February 9, 2024     nitroglycerin 0.4 MG SL tablet  Commonly known as: NITROSTAT   0.4 mg, Sublingual, Every 5 Minutes PRN, Take no more than 3 doses in 15 minutes.      rosuvastatin 20 MG tablet  Commonly known as: CRESTOR   20 mg, Oral, Nightly             Continue These Medications        Instructions Start Date   allopurinol 100 MG tablet  Commonly known as: ZYLOPRIM   100 mg, Oral, Daily      benazepril 20 MG tablet  Commonly known as: LOTENSIN   20 mg, Oral, Daily      famotidine 20 MG tablet  Commonly known as: PEPCID   20 mg, Oral, 2 Times Daily, Patient states he takes 20mg in the morning always and then takes the other 20mg on occasion if he has a flare with his GERD.       Flonase Allergy Relief 50 MCG/ACT nasal spray  Generic drug: fluticasone   Flonase Allergy Relief 50 mcg/actuation nasal spray,suspension   Spray 1 spray every day by intranasal route as needed for 7 days.      metFORMIN 1000 MG tablet  Commonly known as: GLUCOPHAGE   1,000 mg, Oral, 2 Times Daily With Meals      ZyrTEC Allergy 10 MG tablet  Generic drug: cetirizine   Take 1 tablet by mouth Daily As Needed.               Discharge Diet: cardiac no concentrated sweets    Activity at Discharge: light activity. May return to work in one week.     Discharge disposition: home    Condition on Discharge: stable    Follow-up  Appointments  Future Appointments   Date Time Provider Department Center   2/13/2024  9:00 AM [facility]ATION [facility]     Additional Instructions for the Follow-ups that You Need to Schedule       Discharge Follow-up with PCP   As directed       Currently Documented PCP:    Freddie Zarco MD    PCP Phone Number:    [phone]     Follow Up Details: 1-2 weeks        Discharge Follow-up with Specialty: Jered/Clementina; 1 Month   As directed      Specialty: Jered/Belindaer   Follow Up: 1 Month                Test Results Pending at Discharge  Pending Labs       Order Current Status    Basic Metabolic Panel In process    CBC (No Diff) In process             Destinee Mcrae, APRIBSI  02/08/24  10:07 EST         Dictated Utilizing Dragon Dictation

## 2024-02-08 NOTE — PROGRESS NOTES
[facility] Medicine Services  PROGRESS NOTE    Patient Name: Jono Mcintyre  : 1975  MRN: [id number]    Date of Admission: 2024  Primary Care Physician: Freddie Zarco MD    Subjective   Subjective     CC:  Medical management of diabetes    HPI:  Patient reports feeling well this morning, had a restful night.  Denies any chest pain or shortness of breath.  States he is ready to go home.      Objective   Objective     Vital Signs:   Temp:  [97.5 °F (36.4 °C)-98.9 °F (37.2 °C)] 97.6 °F (36.4 °C)  Heart Rate:  [61-95] 72  Resp:  [14-18] 18  BP: (124-159)/() 139/94     Physical Exam:  General appearance: alert, awake, oriented, no acute distress   Cardiovascular: RRR, no murmurs or rubs, radial pulse full 2/4 BL   Respiratory: CTAB, no crackles or wheezes   Abdomen: soft, non-tender, obese, no organomegaly, bowel sounds normoactive    Neuro/CNS: alert and oriented x3, normal speech    Results Reviewed:  LAB RESULTS:      Lab 24  0936 24   WBC 7.98 8.01  --  8.17   HEMOGLOBIN 15.7 13.5  --  15.5   HEMATOCRIT 47.2 40.3  --  46.9   PLATELETS 201 191  --  193   NEUTROS ABS  --   --   --  5.24   IMMATURE GRANS (ABS)  --   --   --  0.13*   LYMPHS ABS  --   --   --  2.26   MONOS ABS  --   --   --  0.38   EOS ABS  --   --   --  0.11   MCV 88.4 85.2  --  86.9   PROTIME  --   --  17.5*  --          Lab 24  0936 2418 24   SODIUM 133* 139  --   --   --    POTASSIUM 4.0 4.3  --   --   --    CHLORIDE 97* 103  --   --   --    CO2 26.0 22.0  --   --   --    ANION GAP 10.0 14.0  --   --   --    BUN 14 12  --   --   --    CREATININE 1.16 0.85  --  0.90  --    EGFR 77.2 106.5  --   --   --    GLUCOSE 212* 180*  --   --   --    CALCIUM 10.2 9.5  --   --   --    MAGNESIUM  --   --  1.7  --   --    HEMOGLOBIN A1C  --   --   --   --  8.80*   TSH  --  0.650  --   --   --              Lab  02/06/24  2210   HSTROP T 365*   PROTIME 17.5*   INR 1.42*         Lab 02/07/24  0718   CHOLESTEROL 211*   LDL CHOL 119*   HDL CHOL 31*   TRIGLYCERIDES 345*         Lab 02/07/24  0718   IRON 66   IRON SATURATION (TSAT) 19*   TIBC 350   TRANSFERRIN 235   FERRITIN 451.70*         Brief Urine Lab Results       None            Microbiology Results Abnormal       None            Adult Transthoracic Echo Complete W/ Cont if Necessary Per Protocol    Result Date: 2/7/2024    Left ventricular systolic function is normal. Estimated left ventricular EF = 60%   The cardiac valves are anatomically and functionally normal.     XR Chest 1 View    Result Date: 2/6/2024  XR CHEST 1 VW Date of Exam: 2/6/2024 8:41 PM CST Indication: Acute STEMI traige protocol Comparison: Chest CT 9/13/2020 Findings: Cardiomediastinal silhouette is unremarkable.  No airspace disease, pneumothorax, nor pleural effusion. No acute osseous abnormality identified.     Impression: Impression: No acute process identified. Electronically Signed: Carmelo Odom MD  2/6/2024 9:04 PM CST  Workstation ID: [id number]    Cardiac Catheterization/Vascular Study    Result Date: 2/6/2024    Acute inferior STEMI and occluded RCA.  Treated 3.5 x 28 Xience ALEC postdilated 4.0 NC trek.   30% distal RCA, mid LAD, and mid LCx.   LVEF 65%      Results for orders placed during the hospital encounter of 02/06/24    Adult Transthoracic Echo Complete W/ Cont if Necessary Per Protocol    Interpretation Summary    Left ventricular systolic function is normal. Estimated left ventricular EF = 60%    The cardiac valves are anatomically and functionally normal.      Current medications:  Scheduled Meds:aspirin, 81 mg, Oral, Daily  clopidogrel, 75 mg, Oral, Daily  empagliflozin, 10 mg, Oral, Daily  heparin (porcine), 5,000 Units, Subcutaneous, Q12H  insulin lispro, 2-7 Units, Subcutaneous, 4x Daily AC & at Bedtime  metoprolol succinate XL, 25 mg, Oral, Q24H  rosuvastatin, 20 mg, Oral,  Nightly  senna-docusate sodium, 2 tablet, Oral, BID  sodium chloride, 10 mL, Intravenous, Q12H  spironolactone, 12.5 mg, Oral, Daily  valsartan, 80 mg, Oral, Daily      Continuous Infusions:   PRN Meds:.  acetaminophen    ALPRAZolam    senna-docusate sodium **AND** polyethylene glycol **AND** bisacodyl **AND** bisacodyl    dextrose    dextrose    diphenhydrAMINE    glucagon (human recombinant)    nitroglycerin    Sodium Chloride (PF)    sodium chloride    sodium chloride    Assessment & Plan   Assessment & Plan     Active Hospital Problems    Diagnosis  POA    **STEMI involving right coronary artery [I21.11]  Yes    Type 2 diabetes mellitus without complication, without long-term current use of insulin [E11.9]  Yes      Resolved Hospital Problems   No resolved problems to display.     Assessment/plan:    Non-insulin-dependent type 2 diabetes mellitus  -Continue metformin on discharge with diet modifications  -Recheck A1c with PCP in April and determine at that time if additional diabetic agents are warranted  -Stable for discharge once all agree      CAD  STEMI  Hypertension  Hyperlipidemia  -Management per cardiology       AM-PAC 6 Clicks Score (PT): 24 (02/08/24 4143)    CODE STATUS:   Code Status and Medical Interventions:   Ordered at: 02/06/24 2132     Code Status (Patient has no pulse and is not breathing):    CPR (Attempt to Resuscitate)     Medical Interventions (Patient has pulse or is breathing):    Full Support       Umesh Tate,   02/08/24

## 2024-02-08 NOTE — PLAN OF CARE
Problem: Adult Inpatient Plan of Care  Goal: Plan of Care Review  Outcome: Ongoing, Progressing  Goal: Patient-Specific Goal (Individualized)  Outcome: Ongoing, Progressing  Goal: Absence of Hospital-Acquired Illness or Injury  Outcome: Ongoing, Progressing  Intervention: Identify and Manage Fall Risk  Recent Flowsheet Documentation  Taken 2/7/2024 2000 by Gregory James RN  Safety Promotion/Fall Prevention:   assistive device/personal items within reach   clutter free environment maintained   fall prevention program maintained   nonskid shoes/slippers when out of bed   safety round/check completed   room organization consistent  Intervention: Prevent Skin Injury  Recent Flowsheet Documentation  Taken 2/7/2024 2000 by Gregory James RN  Body Position: position changed independently  Skin Protection:   adhesive use limited   skin-to-skin areas padded  Intervention: Prevent and Manage VTE (Venous Thromboembolism) Risk  Recent Flowsheet Documentation  Taken 2/7/2024 2000 by Gregory James RN  Activity Management: up ad chuy  Range of Motion: active ROM (range of motion) encouraged  Intervention: Prevent Infection  Recent Flowsheet Documentation  Taken 2/7/2024 2000 by Gregory James RN  Infection Prevention: rest/sleep promoted  Goal: Optimal Comfort and Wellbeing  Outcome: Ongoing, Progressing  Intervention: Provide Person-Centered Care  Recent Flowsheet Documentation  Taken 2/7/2024 2000 by Gregory James RN  Trust Relationship/Rapport:   care explained   questions encouraged   thoughts/feelings acknowledged   choices provided  Goal: Readiness for Transition of Care  Outcome: Ongoing, Progressing   Goal Outcome Evaluation:                                               Speech Language Pathology  Facility/Department: [facility]T ORTHO/NEURO   CLINICAL BEDSIDE SWALLOW EVALUATION / DISCHARGE    NAME: Benedicto Oleary  : 1964  MRN: [id number]    ADMISSION DATE: 2020  ADMITTING DIAGNOSIS: has Urinary incontinence, mixed; Multiple sclerosis ([address] Dyslipidemia; Chronic fatigue; GERD (gastroesophageal reflux disease); Vitamin D deficiency; Thyroid nodule; Benign essential HTN; Fibromyalgia; DDD (degenerative disc disease), lumbar; Fibrocystic breast; Anxiety; DEVIN (obstructive sleep apnea); Major depressive disorder, recurrent episode, moderate ([address] Adjustment disorder with mixed anxiety and depressed mood; Mild intermittent asthma without complication; Umbilical hernia without obstruction and without gangrene; Ureterolithiasis; Bilateral kidney stones; and Sepsis ([facility] [address] on their problem list.  ONSET DATE: 20    Recent Chest Xray/CT of Chest: (20)  IMPRESSION:   1.  Mild-to-moderate diffuse hazy opacities are suspicious for pulmonary edema versus diffuse    pneumonia, though a component may be due to the low lung volumes. Tiny bilateral pleural    effusions are also suspected. 2.  Widened upper mediastinal contours are likely related to supine technique and low lung    volumes but can also be seen with other mediastinal pathologies. Consider upright PA chest with    improved inspiration for further evaluation. Date of Eval: 2020  Evaluating Therapist: Hill Sellers    Current Diet level:  Current Diet : Regular  Current Liquid Diet : Thin      Primary Complaint  Patient Complaint: none    Pain:  Pain Assessment: denies    Reason for Referral  Benedicto Oleary was referred for a bedside swallow evaluation to assess the efficiency of her swallow function, identify signs and symptoms of aspiration and make recommendations regarding safe dietary consistencies, effective compensatory strategies, and safe eating environment.     Impression  Dysphagia Diagnosis: Swallow function appears grossly intact  Dysphagia Impression:  Swallow function appears grossly intact. Pt is able to demonstrate adequate labial seal and mastication of solids. No oral reside noted. She is able to swallow all consistencies administered (including several consecutive swallow of thin liquid via cup) with no clinical s/s aspiration. Dysphagia Outcome Severity Scale: Level 7: Normal in all situations     Treatment Plan  Requires SLP Intervention: No  Duration/Frequency of Treatment: not indicated          Recommended Diet and Intervention  Diet Solids Recommendation: Regular  Liquid Consistency Recommendation: Thin  Recommended Form of Meds: PO          General Swallowing Strategies  Upright as possible for all oral intake  Eat slowly    Treatment/Goals  N/A    General  Chart Reviewed: Yes  Behavior/Cognition: Alert; Cooperative; Confused; Requires cueing  Temperature Spikes Noted: No (per flowsheet)  Respiratory Status: O2 via nasual cannula  Breath Sounds: Clear (per flowsheet)  O2 Device: Nasal cannula  Liters of Oxygen: 3 L  Communication Observation: Functional  Follows Directions: Simple  Dentition: Adequate  Patient Positioning: Upright in bed  Baseline Vocal Quality: Normal  Prior Dysphagia History: none noted or reported  Consistencies Administered: Ice Chips; Thin - cup; Thin - straw; Dysphagia Pureed (Dysphagia I); Reg solid    Vision/Hearing  Vision: Within Functional Limits  Hearing: Within functional limits    Oral Motor Deficits  Oral Motor: Within functional limits    Oral Phase Dysfunction  Oral Phase: WFL  Oral Phase - Comment: Adequate labial seal, mastication, no appreciable oral residue. Indicators of Pharyngeal Phase Dysfunction  Pharyngeal Phase: WFL  Pharyngeal Phase - Comment: Adequate laryngeal elevation, no clinical s/s aspiration noted.       Prognosis  Prognosis for safe diet advancement: good  Consulted and agree with results and recommendations:

## 2024-02-08 NOTE — CASE MANAGEMENT/SOCIAL WORK
Case Management Discharge Note      Final Note: Plan is home with family, Family will transport. No discharge needs identified.         Selected Continued Care - Admitted Since 2/6/2024       Destination    No services have been selected for the patient.                Durable Medical Equipment    No services have been selected for the patient.                Dialysis/Infusion    No services have been selected for the patient.                Home Medical Care    No services have been selected for the patient.                Therapy    No services have been selected for the patient.                Community Resources    No services have been selected for the patient.                Community & DME    No services have been selected for the patient.                         Final Discharge Disposition Code: 01 - home or self-care

## 2024-02-09 NOTE — OUTREACH NOTE
Prep Survey      Flowsheet Row Responses   [facility] facility patient discharged from? [place]   Is LACE score < 7 ? No   Eligibility Readm Mgmt   Discharge diagnosis STEMI   Does the patient have one of the following disease processes/diagnoses(primary or secondary)? Acute MI (STEMI,NSTEMI)   Does the patient have Home health ordered? No   Is there a DME ordered? No   Prep survey completed? Yes            DAVID ARROYO - Registered Nurse

## 2024-02-13 ENCOUNTER — TREATMENT (OUTPATIENT)
Dept: CARDIAC REHAB | Facility: HOSPITAL | Age: 49
End: 2024-02-13
Payer: COMMERCIAL

## 2024-02-13 ENCOUNTER — READMISSION MANAGEMENT (OUTPATIENT)
Dept: CALL CENTER | Facility: HOSPITAL | Age: 49
End: 2024-02-13
Payer: COMMERCIAL

## 2024-02-13 DIAGNOSIS — I21.3 ST ELEVATION MYOCARDIAL INFARCTION (STEMI), UNSPECIFIED ARTERY: ICD-10-CM

## 2024-02-13 PROCEDURE — 93798 PHYS/QHP OP CAR RHAB W/ECG: CPT

## 2024-02-14 ENCOUNTER — APPOINTMENT (OUTPATIENT)
Dept: CARDIAC REHAB | Facility: HOSPITAL | Age: 49
End: 2024-02-14
Payer: COMMERCIAL

## 2024-02-14 ENCOUNTER — TREATMENT (OUTPATIENT)
Dept: CARDIAC REHAB | Facility: HOSPITAL | Age: 49
End: 2024-02-14
Payer: COMMERCIAL

## 2024-02-14 DIAGNOSIS — I21.3 ST ELEVATION MYOCARDIAL INFARCTION (STEMI), UNSPECIFIED ARTERY: Primary | ICD-10-CM

## 2024-02-14 PROCEDURE — 93798 PHYS/QHP OP CAR RHAB W/ECG: CPT

## 2024-02-14 PROCEDURE — 93797 PHYS/QHP OP CAR RHAB WO ECG: CPT

## 2024-02-16 ENCOUNTER — READMISSION MANAGEMENT (OUTPATIENT)
Dept: CALL CENTER | Facility: HOSPITAL | Age: 49
End: 2024-02-16
Payer: COMMERCIAL

## 2024-02-16 ENCOUNTER — TREATMENT (OUTPATIENT)
Dept: CARDIAC REHAB | Facility: HOSPITAL | Age: 49
End: 2024-02-16
Payer: COMMERCIAL

## 2024-02-16 DIAGNOSIS — I21.3 ST ELEVATION MYOCARDIAL INFARCTION (STEMI), UNSPECIFIED ARTERY: Primary | ICD-10-CM

## 2024-02-16 LAB — GLUCOSE BLDC GLUCOMTR-MCNC: 147 MG/DL (ref 70–130)

## 2024-02-16 PROCEDURE — 82948 REAGENT STRIP/BLOOD GLUCOSE: CPT

## 2024-02-16 PROCEDURE — 93798 PHYS/QHP OP CAR RHAB W/ECG: CPT

## 2024-02-19 ENCOUNTER — TREATMENT (OUTPATIENT)
Dept: CARDIAC REHAB | Facility: HOSPITAL | Age: 49
End: 2024-02-19
Payer: COMMERCIAL

## 2024-02-19 DIAGNOSIS — I21.3 ST ELEVATION MYOCARDIAL INFARCTION (STEMI), UNSPECIFIED ARTERY: Primary | ICD-10-CM

## 2024-02-19 PROCEDURE — 93798 PHYS/QHP OP CAR RHAB W/ECG: CPT

## 2024-02-20 ENCOUNTER — READMISSION MANAGEMENT (OUTPATIENT)
Dept: CALL CENTER | Facility: HOSPITAL | Age: 49
End: 2024-02-20
Payer: COMMERCIAL

## 2024-02-20 NOTE — OUTREACH NOTE
AMI Week 1 Survey      Flowsheet Row Responses   [facility] [place] facility patient discharged from? [place]   Does the patient have one of the following disease processes/diagnoses(primary or secondary)? Acute MI (STEMI,NSTEMI)   Week 1 attempt successful? No   Unsuccessful attempts Attempt 3   Revoke Decline to participate            Sarah HAYES - Licensed Nurse

## 2024-02-21 ENCOUNTER — TREATMENT (OUTPATIENT)
Dept: CARDIAC REHAB | Facility: HOSPITAL | Age: 49
End: 2024-02-21
Payer: COMMERCIAL

## 2024-02-21 DIAGNOSIS — I21.3 ST ELEVATION MYOCARDIAL INFARCTION (STEMI), UNSPECIFIED ARTERY: Primary | ICD-10-CM

## 2024-02-21 PROCEDURE — 93798 PHYS/QHP OP CAR RHAB W/ECG: CPT

## 2024-02-23 ENCOUNTER — TREATMENT (OUTPATIENT)
Dept: CARDIAC REHAB | Facility: HOSPITAL | Age: 49
End: 2024-02-23
Payer: COMMERCIAL

## 2024-02-23 DIAGNOSIS — I21.3 ST ELEVATION MYOCARDIAL INFARCTION (STEMI), UNSPECIFIED ARTERY: Primary | ICD-10-CM

## 2024-02-23 PROCEDURE — 93798 PHYS/QHP OP CAR RHAB W/ECG: CPT

## 2024-02-26 ENCOUNTER — TREATMENT (OUTPATIENT)
Dept: CARDIAC REHAB | Facility: HOSPITAL | Age: 49
End: 2024-02-26
Payer: COMMERCIAL

## 2024-02-26 DIAGNOSIS — I21.3 ST ELEVATION MYOCARDIAL INFARCTION (STEMI), UNSPECIFIED ARTERY: Primary | ICD-10-CM

## 2024-02-26 PROCEDURE — 93798 PHYS/QHP OP CAR RHAB W/ECG: CPT

## 2024-02-28 ENCOUNTER — TREATMENT (OUTPATIENT)
Dept: CARDIAC REHAB | Facility: HOSPITAL | Age: 49
End: 2024-02-28
Payer: COMMERCIAL

## 2024-02-28 DIAGNOSIS — I21.3 ST ELEVATION MYOCARDIAL INFARCTION (STEMI), UNSPECIFIED ARTERY: Primary | ICD-10-CM

## 2024-02-28 PROCEDURE — 93798 PHYS/QHP OP CAR RHAB W/ECG: CPT

## 2024-03-01 ENCOUNTER — TELEPHONE (OUTPATIENT)
Dept: CARDIAC REHAB | Facility: HOSPITAL | Age: 49
End: 2024-03-01
Payer: COMMERCIAL

## 2024-03-01 NOTE — TELEPHONE ENCOUNTER
Patient called to cancel Phase II Cardiac Rehab session. Patient states he is not feeling well today and plans to return to Cardiac Rehab on 3/4/24.

## 2024-03-04 ENCOUNTER — TREATMENT (OUTPATIENT)
Dept: CARDIAC REHAB | Facility: HOSPITAL | Age: 49
End: 2024-03-04
Payer: COMMERCIAL

## 2024-03-04 DIAGNOSIS — I21.3 ST ELEVATION MYOCARDIAL INFARCTION (STEMI), UNSPECIFIED ARTERY: Primary | ICD-10-CM

## 2024-03-04 PROCEDURE — 93798 PHYS/QHP OP CAR RHAB W/ECG: CPT

## 2024-03-06 ENCOUNTER — TREATMENT (OUTPATIENT)
Dept: CARDIAC REHAB | Facility: HOSPITAL | Age: 49
End: 2024-03-06
Payer: COMMERCIAL

## 2024-03-06 ENCOUNTER — APPOINTMENT (OUTPATIENT)
Dept: CARDIAC REHAB | Facility: HOSPITAL | Age: 49
End: 2024-03-06
Payer: COMMERCIAL

## 2024-03-06 DIAGNOSIS — I21.3 ST ELEVATION MYOCARDIAL INFARCTION (STEMI), UNSPECIFIED ARTERY: Primary | ICD-10-CM

## 2024-03-06 PROCEDURE — 93798 PHYS/QHP OP CAR RHAB W/ECG: CPT

## 2024-03-06 PROCEDURE — 93797 PHYS/QHP OP CAR RHAB WO ECG: CPT

## 2024-03-06 NOTE — PROGRESS NOTES
NUTRITION ASSESSMENT & EDUCATION  CARDIAC/PULMONARY REHAB      Appointment Date and Time: 03/06/24, 08:54 EST  Patient Name: Jono Mcintyre   Age: 48 y.o.     Sex:  male                                        This is a follow up nutrition counseling today. Jono set nutrition goals at initial meeting   2/14 and we wanted to check back in to evaluate his progress.    He started on Ozempic 25 mcg on 2/14 and has noticed significant decrease in appetite allowing him to have better control of his food choices and portion control.  Jono is down 9 lbs since the visit 3 weeks ago. He is scheduled to have an A1c and other labs drawn in April.    This is a busy family and they do not have home cooked sit down meals in generally.   Jono likes to eat out often as this gives him a break from work and allows him to catch up on networking and family needs.     Nutrition Influences:   Appetite: good , in better control             Factors limiting PO intake: none    Food records reviewed?: Extensive review and discussion of home diet today.    Jono lives with: wife and children  Jono receives lifestyle support from others at home?: Yes  Spouse/significant other present for diet instruction today?: No    Diet Assessment:     Meal intake pattern:  3 meals/day  Dining out:  often twice daily               Fast food:  occasional    Jono brought three weeks of detailed food diaries for review today.  In these three weeks, he has had fish 12 times, he has had a consistent breakfast of oatmeal/fruit daily.  He has limited intake of fruit and vegetables - more in the initial 2 weeks of tracking. This was discussed and he will strive to have more fruit/veg.         Motivation level toward diet compliance:  strong  Assessment / Recommendations:   Prior diet education before to coming to Cardiac Rehab?: 2/14/24 with this RD  Jono renewed his determination to eat healthy and will continue to eat breakfast at home; avoid appetizers,  increase fish/poultry instead of red meat, cut back on cheeses, add nuts. Increase daily choices for fruit and vegetables. We will follow up again in 4 weeks.    Plan:  Encouraged to complete goals and continue setting new nutrition/exercise goals             Encouraged to follow up with dietitian during cardiac rehab sessions; may request additional RD counseling.    Rylee Campos RD, 08:54 EST - 3/6/2024

## 2024-03-08 ENCOUNTER — TREATMENT (OUTPATIENT)
Dept: CARDIAC REHAB | Facility: HOSPITAL | Age: 49
End: 2024-03-08
Payer: COMMERCIAL

## 2024-03-08 DIAGNOSIS — I21.3 ST ELEVATION MYOCARDIAL INFARCTION (STEMI), UNSPECIFIED ARTERY: Primary | ICD-10-CM

## 2024-03-08 PROCEDURE — 93798 PHYS/QHP OP CAR RHAB W/ECG: CPT

## 2024-03-11 ENCOUNTER — TREATMENT (OUTPATIENT)
Dept: CARDIAC REHAB | Facility: HOSPITAL | Age: 49
End: 2024-03-11
Payer: COMMERCIAL

## 2024-03-11 DIAGNOSIS — I21.3 ST ELEVATION MYOCARDIAL INFARCTION (STEMI), UNSPECIFIED ARTERY: Primary | ICD-10-CM

## 2024-03-11 PROCEDURE — 93798 PHYS/QHP OP CAR RHAB W/ECG: CPT

## 2024-03-13 ENCOUNTER — TREATMENT (OUTPATIENT)
Dept: CARDIAC REHAB | Facility: HOSPITAL | Age: 49
End: 2024-03-13
Payer: COMMERCIAL

## 2024-03-13 DIAGNOSIS — I21.3 ST ELEVATION MYOCARDIAL INFARCTION (STEMI), UNSPECIFIED ARTERY: Primary | ICD-10-CM

## 2024-03-13 PROCEDURE — 93798 PHYS/QHP OP CAR RHAB W/ECG: CPT

## 2024-03-15 ENCOUNTER — TREATMENT (OUTPATIENT)
Dept: CARDIAC REHAB | Facility: HOSPITAL | Age: 49
End: 2024-03-15
Payer: COMMERCIAL

## 2024-03-15 DIAGNOSIS — I21.3 ST ELEVATION MYOCARDIAL INFARCTION (STEMI), UNSPECIFIED ARTERY: Primary | ICD-10-CM

## 2024-03-15 PROCEDURE — 93798 PHYS/QHP OP CAR RHAB W/ECG: CPT

## 2024-03-18 ENCOUNTER — TREATMENT (OUTPATIENT)
Dept: CARDIAC REHAB | Facility: HOSPITAL | Age: 49
End: 2024-03-18
Payer: COMMERCIAL

## 2024-03-18 DIAGNOSIS — I21.3 ST ELEVATION MYOCARDIAL INFARCTION (STEMI), UNSPECIFIED ARTERY: Primary | ICD-10-CM

## 2024-03-18 PROCEDURE — 93798 PHYS/QHP OP CAR RHAB W/ECG: CPT

## 2024-03-20 ENCOUNTER — TREATMENT (OUTPATIENT)
Dept: CARDIAC REHAB | Facility: HOSPITAL | Age: 49
End: 2024-03-20
Payer: COMMERCIAL

## 2024-03-20 DIAGNOSIS — I21.3 ST ELEVATION MYOCARDIAL INFARCTION (STEMI), UNSPECIFIED ARTERY: Primary | ICD-10-CM

## 2024-03-20 PROCEDURE — 93798 PHYS/QHP OP CAR RHAB W/ECG: CPT

## 2024-03-22 ENCOUNTER — TREATMENT (OUTPATIENT)
Dept: CARDIAC REHAB | Facility: HOSPITAL | Age: 49
End: 2024-03-22
Payer: COMMERCIAL

## 2024-03-22 DIAGNOSIS — I21.3 ST ELEVATION MYOCARDIAL INFARCTION (STEMI), UNSPECIFIED ARTERY: Primary | ICD-10-CM

## 2024-03-22 PROCEDURE — 93798 PHYS/QHP OP CAR RHAB W/ECG: CPT

## 2024-03-25 ENCOUNTER — TREATMENT (OUTPATIENT)
Dept: CARDIAC REHAB | Facility: HOSPITAL | Age: 49
End: 2024-03-25
Payer: COMMERCIAL

## 2024-03-25 DIAGNOSIS — I21.3 ST ELEVATION MYOCARDIAL INFARCTION (STEMI), UNSPECIFIED ARTERY: Primary | ICD-10-CM

## 2024-03-25 PROCEDURE — 93798 PHYS/QHP OP CAR RHAB W/ECG: CPT

## 2024-03-27 ENCOUNTER — TREATMENT (OUTPATIENT)
Dept: CARDIAC REHAB | Facility: HOSPITAL | Age: 49
End: 2024-03-27
Payer: COMMERCIAL

## 2024-03-27 DIAGNOSIS — I21.3 ST ELEVATION MYOCARDIAL INFARCTION (STEMI), UNSPECIFIED ARTERY: Primary | ICD-10-CM

## 2024-03-27 PROCEDURE — 93798 PHYS/QHP OP CAR RHAB W/ECG: CPT

## 2024-03-29 ENCOUNTER — APPOINTMENT (OUTPATIENT)
Dept: CARDIAC REHAB | Facility: HOSPITAL | Age: 49
End: 2024-03-29
Payer: COMMERCIAL

## 2024-04-08 ENCOUNTER — TREATMENT (OUTPATIENT)
Dept: CARDIAC REHAB | Facility: HOSPITAL | Age: 49
End: 2024-04-08
Payer: COMMERCIAL

## 2024-04-08 DIAGNOSIS — I21.3 ST ELEVATION MYOCARDIAL INFARCTION (STEMI), UNSPECIFIED ARTERY: Primary | ICD-10-CM

## 2024-04-08 PROCEDURE — 93798 PHYS/QHP OP CAR RHAB W/ECG: CPT

## 2024-04-10 ENCOUNTER — TREATMENT (OUTPATIENT)
Dept: CARDIAC REHAB | Facility: HOSPITAL | Age: 49
End: 2024-04-10
Payer: COMMERCIAL

## 2024-04-10 DIAGNOSIS — I21.3 ST ELEVATION MYOCARDIAL INFARCTION (STEMI), UNSPECIFIED ARTERY: Primary | ICD-10-CM

## 2024-04-10 PROCEDURE — 93798 PHYS/QHP OP CAR RHAB W/ECG: CPT

## 2024-04-12 ENCOUNTER — TREATMENT (OUTPATIENT)
Dept: CARDIAC REHAB | Facility: HOSPITAL | Age: 49
End: 2024-04-12
Payer: COMMERCIAL

## 2024-04-12 DIAGNOSIS — I21.3 ST ELEVATION MYOCARDIAL INFARCTION (STEMI), UNSPECIFIED ARTERY: Primary | ICD-10-CM

## 2024-04-12 PROCEDURE — 93798 PHYS/QHP OP CAR RHAB W/ECG: CPT

## 2024-04-15 ENCOUNTER — TREATMENT (OUTPATIENT)
Dept: CARDIAC REHAB | Facility: HOSPITAL | Age: 49
End: 2024-04-15
Payer: COMMERCIAL

## 2024-04-15 DIAGNOSIS — I21.3 ST ELEVATION MYOCARDIAL INFARCTION (STEMI), UNSPECIFIED ARTERY: Primary | ICD-10-CM

## 2024-04-15 PROCEDURE — 93798 PHYS/QHP OP CAR RHAB W/ECG: CPT

## 2024-04-17 ENCOUNTER — TREATMENT (OUTPATIENT)
Dept: CARDIAC REHAB | Facility: HOSPITAL | Age: 49
End: 2024-04-17
Payer: COMMERCIAL

## 2024-04-17 DIAGNOSIS — I21.3 ST ELEVATION MYOCARDIAL INFARCTION (STEMI), UNSPECIFIED ARTERY: Primary | ICD-10-CM

## 2024-04-17 PROCEDURE — 93798 PHYS/QHP OP CAR RHAB W/ECG: CPT

## 2024-04-19 NOTE — PROGRESS NOTES
Subjective:     Encounter Date:04/22/2024    Primary Care Physician: Freddie Zarco MD      Patient ID: Jono Mcintyre is a 48 y.o. male.    Chief Complaint:Coronary Artery Disease    Problem list:  Coronary artery disease,   Inferior STEMI 2/6/24 occluded RCA (3.5 x 28 Xience ALEC).  30% distal RCA, mid LAD, and mid circumflex.  EF of 65%.  Type 2 diabetes  Hypertension  Hyperlipidemia  Obstructive sleep apnea  Gout  GERD  DVT in setting of COVID 2020.      Allergies   Allergen Reactions    No Known Drug Allergy          Current Outpatient Medications:     allopurinol (ZYLOPRIM) 100 MG tablet, Take 1 tablet by mouth Daily., Disp: , Rfl: 0    aspirin 81 MG EC tablet, Take 1 tablet by mouth Daily., Disp: 30 tablet, Rfl: 11    benazepril (LOTENSIN) 20 MG tablet, Take 1 tablet by mouth Daily., Disp: , Rfl:     cetirizine (ZYRTEC ALLERGY) 10 MG tablet, Take 1 tablet by mouth Daily As Needed., Disp: , Rfl:     clopidogrel (PLAVIX) 75 MG tablet, Take 1 tablet by mouth Daily., Disp: 30 tablet, Rfl: 11    empagliflozin (JARDIANCE) 10 MG tablet tablet, Take 1 tablet by mouth Daily., Disp: 30 tablet, Rfl: 11    famotidine (PEPCID) 20 MG tablet, Take 1 tablet by mouth 2 (Two) Times a Day. Patient states he takes 20mg in the morning always and then takes the other 20mg on occasion if he has a flare with his GERD., Disp: , Rfl:     fluticasone (FLONASE ALLERGY RELIEF) 50 MCG/ACT nasal spray, Flonase Allergy Relief 50 mcg/actuation nasal spray,suspension  Spray 1 spray every day by intranasal route as needed for 7 days., Disp: , Rfl:     metoprolol succinate XL (TOPROL-XL) 25 MG 24 hr tablet, Take 1 tablet by mouth Daily., Disp: 30 tablet, Rfl: 11    nitroglycerin (NITROSTAT) 0.4 MG SL tablet, Place 1 tablet under the tongue Every 5 (Five) Minutes As Needed for Chest Pain (Only if SBP Greater Than 100). Take no more than 3 doses in 15 minutes., Disp: 100 tablet, Rfl: 2    rosuvastatin (CRESTOR) 20 MG tablet, Take 1 tablet by  "mouth Every Night., Disp: 30 tablet, Rfl: 11    Semaglutide (OZEMPIC, 0.25 OR 0.5 MG/DOSE, SC), Inject 0.25 mg under the skin into the appropriate area as directed 1 (One) Time Per Week., Disp: , Rfl:         History of Present Illness    Patient is a 48-year-old  male who presents today for hospital follow-up status post inferior STEMI.  Since being discharged patient notes overall doing well.  He denies any chest pain, pressure, tightness.  He is attending cardiac rehab 3 days a week.  Notes that he is gradually increasing activity.  Would like to resume exercising.  No reported syncope, presyncope, or edema.  Patient was able to decrease his A1c from 8.8 to below 7.  Has also been intentionally losing weight.  Is currently on Ozempic.  Would like to resume playing golf.    The following portions of the patient's history were reviewed and updated as appropriate: allergies, current medications, past family history, past medical history, past social history, past surgical history and problem list.      Social History     Tobacco Use    Smoking status: Never    Smokeless tobacco: Never   Vaping Use    Vaping status: Never Used   Substance Use Topics    Alcohol use: Yes    Drug use: Never         ROS       Objective:   /60 (BP Location: Right arm, Patient Position: Sitting)   Pulse 74   Ht 177.8 cm (70\")   Wt 108 kg (238 lb)   SpO2 97%   BMI 34.15 kg/m²         Vitals reviewed.   Constitutional:       Appearance: Well-developed and not in distress.   Neck:      Vascular: No JVD.      Trachea: No tracheal deviation.   Pulmonary:      Effort: Pulmonary effort is normal.      Breath sounds: Normal breath sounds.   Cardiovascular:      Normal rate. Regular rhythm.      Murmurs: There is no murmur.   Edema:     Peripheral edema absent.   Musculoskeletal:         General: No deformity. Skin:     General: Skin is warm and dry.   Neurological:      Mental Status: Alert and oriented to person, place, and " time.         Procedures          Assessment:   Assessment & Plan      Diagnoses and all orders for this visit:    1. Coronary artery disease involving native coronary artery of native heart without angina pectoris (Primary), stable.  No active angina.  On aspirin and Plavix.  -     Lipid Panel; Future  -     Comprehensive Metabolic Panel; Future    2. Hypertension, unspecified type, controlled.  On beta-blocker.    3. Dyslipidemia, on statin.  No recheck lipids since MI.      Plan:  Patient is overall stable from cardiac standpoint.  Discussed with patient he may begin to resume normal activities including routine exercise and golfing.  Reviewed his medications and their overall indications from cardiac standpoint.  Continue current cardiac medications.  Check lipid panel and CMP in the near term to evaluate statin efficacy.  Follow-up in 6 months time or sooner if needed.       Destinee ESTES           Dictated utilizing Dragon dictation

## 2024-04-22 ENCOUNTER — OFFICE VISIT (OUTPATIENT)
Dept: CARDIOLOGY | Facility: CLINIC | Age: 49
End: 2024-04-22
Payer: COMMERCIAL

## 2024-04-22 ENCOUNTER — TREATMENT (OUTPATIENT)
Dept: CARDIAC REHAB | Facility: HOSPITAL | Age: 49
End: 2024-04-22
Payer: COMMERCIAL

## 2024-04-22 VITALS
DIASTOLIC BLOOD PRESSURE: 60 MMHG | SYSTOLIC BLOOD PRESSURE: 118 MMHG | HEART RATE: 74 BPM | WEIGHT: 238 LBS | HEIGHT: 70 IN | OXYGEN SATURATION: 97 % | BODY MASS INDEX: 34.07 KG/M2

## 2024-04-22 DIAGNOSIS — I21.3 ST ELEVATION MYOCARDIAL INFARCTION (STEMI), UNSPECIFIED ARTERY: Primary | ICD-10-CM

## 2024-04-22 DIAGNOSIS — I25.10 CORONARY ARTERY DISEASE INVOLVING NATIVE CORONARY ARTERY OF NATIVE HEART WITHOUT ANGINA PECTORIS: Primary | ICD-10-CM

## 2024-04-22 DIAGNOSIS — I10 HYPERTENSION, UNSPECIFIED TYPE: ICD-10-CM

## 2024-04-22 DIAGNOSIS — E78.5 DYSLIPIDEMIA: ICD-10-CM

## 2024-04-22 PROCEDURE — 93798 PHYS/QHP OP CAR RHAB W/ECG: CPT

## 2024-04-22 PROCEDURE — 99214 OFFICE O/P EST MOD 30 MIN: CPT | Performed by: NURSE PRACTITIONER

## 2024-04-22 NOTE — LETTER
April 22, 2024       No Recipients    Patient: Jono Mcintyre   YOB: 1975   Date of Visit: 4/22/2024     Dear Freddie Zarco MD:       Thank you for referring Jono Mcintyre to me for evaluation. Below are the relevant portions of my assessment and plan of care.    If you have questions, please do not hesitate to call me. I look forward to following Jono along with you.         Sincerely,        FRANKLYN White        CC:   No Recipients    Destinee Mcrae APRN  04/22/24 1649  Sign when Signing Visit   Subjective:     Encounter Date:04/22/2024    Primary Care Physician: Freddie Zarco MD      Patient ID: Jono Mcintyre is a 48 y.o. male.    Chief Complaint:Coronary Artery Disease    Problem list:  Coronary artery disease,   Inferior STEMI 2/6/24 occluded RCA (3.5 x 28 Xience ALEC).  30% distal RCA, mid LAD, and mid circumflex.  EF of 65%.  Type 2 diabetes  Hypertension  Hyperlipidemia  Obstructive sleep apnea  Gout  GERD  DVT in setting of COVID 2020.      Allergies   Allergen Reactions   • No Known Drug Allergy          Current Outpatient Medications:   •  allopurinol (ZYLOPRIM) 100 MG tablet, Take 1 tablet by mouth Daily., Disp: , Rfl: 0  •  aspirin 81 MG EC tablet, Take 1 tablet by mouth Daily., Disp: 30 tablet, Rfl: 11  •  benazepril (LOTENSIN) 20 MG tablet, Take 1 tablet by mouth Daily., Disp: , Rfl:   •  cetirizine (ZYRTEC ALLERGY) 10 MG tablet, Take 1 tablet by mouth Daily As Needed., Disp: , Rfl:   •  clopidogrel (PLAVIX) 75 MG tablet, Take 1 tablet by mouth Daily., Disp: 30 tablet, Rfl: 11  •  empagliflozin (JARDIANCE) 10 MG tablet tablet, Take 1 tablet by mouth Daily., Disp: 30 tablet, Rfl: 11  •  famotidine (PEPCID) 20 MG tablet, Take 1 tablet by mouth 2 (Two) Times a Day. Patient states he takes 20mg in the morning always and then takes the other 20mg on occasion if he has a flare with his GERD., Disp: , Rfl:   •  fluticasone (FLONASE ALLERGY RELIEF) 50 MCG/ACT nasal  "spray, Flonase Allergy Relief 50 mcg/actuation nasal spray,suspension  Spray 1 spray every day by intranasal route as needed for 7 days., Disp: , Rfl:   •  metoprolol succinate XL (TOPROL-XL) 25 MG 24 hr tablet, Take 1 tablet by mouth Daily., Disp: 30 tablet, Rfl: 11  •  nitroglycerin (NITROSTAT) 0.4 MG SL tablet, Place 1 tablet under the tongue Every 5 (Five) Minutes As Needed for Chest Pain (Only if SBP Greater Than 100). Take no more than 3 doses in 15 minutes., Disp: 100 tablet, Rfl: 2  •  rosuvastatin (CRESTOR) 20 MG tablet, Take 1 tablet by mouth Every Night., Disp: 30 tablet, Rfl: 11  •  Semaglutide (OZEMPIC, 0.25 OR 0.5 MG/DOSE, SC), Inject 0.25 mg under the skin into the appropriate area as directed 1 (One) Time Per Week., Disp: , Rfl:         History of Present Illness    Patient is a 48-year-old  male who presents today for hospital follow-up status post inferior STEMI.  Since being discharged patient notes overall doing well.  He denies any chest pain, pressure, tightness.  He is attending cardiac rehab 3 days a week.  Notes that he is gradually increasing activity.  Would like to resume exercising.  No reported syncope, presyncope, or edema.  Patient was able to decrease his A1c from 8.8 to below 7.  Has also been intentionally losing weight.  Is currently on Ozempic.  Would like to resume playing golf.    The following portions of the patient's history were reviewed and updated as appropriate: allergies, current medications, past family history, past medical history, past social history, past surgical history and problem list.      Social History     Tobacco Use   • Smoking status: Never   • Smokeless tobacco: Never   Vaping Use   • Vaping status: Never Used   Substance Use Topics   • Alcohol use: Yes   • Drug use: Never         ROS       Objective:   /60 (BP Location: Right arm, Patient Position: Sitting)   Pulse 74   Ht 177.8 cm (70\")   Wt 108 kg (238 lb)   SpO2 97%   BMI 34.15 " kg/m²         Vitals reviewed.   Constitutional:       Appearance: Well-developed and not in distress.   Neck:      Vascular: No JVD.      Trachea: No tracheal deviation.   Pulmonary:      Effort: Pulmonary effort is normal.      Breath sounds: Normal breath sounds.   Cardiovascular:      Normal rate. Regular rhythm.      Murmurs: There is no murmur.   Edema:     Peripheral edema absent.   Musculoskeletal:         General: No deformity. Skin:     General: Skin is warm and dry.   Neurological:      Mental Status: Alert and oriented to person, place, and time.         Procedures          Assessment:   Assessment & Plan     Diagnoses and all orders for this visit:    1. Coronary artery disease involving native coronary artery of native heart without angina pectoris (Primary), stable.  No active angina.  On aspirin and Plavix.  -     Lipid Panel; Future  -     Comprehensive Metabolic Panel; Future    2. Hypertension, unspecified type, controlled.  On beta-blocker.    3. Dyslipidemia, on statin.  No recheck lipids since MI.      Plan:  Patient is overall stable from cardiac standpoint.  Discussed with patient he may begin to resume normal activities including routine exercise and golfing.  Reviewed his medications and their overall indications from cardiac standpoint.  Continue current cardiac medications.  Check lipid panel and CMP in the near term to evaluate statin efficacy.  Follow-up in 6 months time or sooner if needed.       Destinee ESTES           Dictated utilizing Dragon dictation

## 2024-04-24 ENCOUNTER — TREATMENT (OUTPATIENT)
Dept: CARDIAC REHAB | Facility: HOSPITAL | Age: 49
End: 2024-04-24
Payer: COMMERCIAL

## 2024-04-24 DIAGNOSIS — I21.3 ST ELEVATION MYOCARDIAL INFARCTION (STEMI), UNSPECIFIED ARTERY: Primary | ICD-10-CM

## 2024-04-24 PROCEDURE — 93798 PHYS/QHP OP CAR RHAB W/ECG: CPT

## 2024-04-25 ENCOUNTER — LAB (OUTPATIENT)
Dept: LAB | Facility: HOSPITAL | Age: 49
End: 2024-04-25
Payer: COMMERCIAL

## 2024-04-25 DIAGNOSIS — I25.10 CORONARY ARTERY DISEASE INVOLVING NATIVE CORONARY ARTERY OF NATIVE HEART WITHOUT ANGINA PECTORIS: ICD-10-CM

## 2024-04-25 LAB
ALBUMIN SERPL-MCNC: 4.6 G/DL (ref 3.5–5.2)
ALBUMIN/GLOB SERPL: 1.6 G/DL
ALP SERPL-CCNC: 94 U/L (ref 39–117)
ALT SERPL W P-5'-P-CCNC: 29 U/L (ref 1–41)
ANION GAP SERPL CALCULATED.3IONS-SCNC: 8.1 MMOL/L (ref 5–15)
AST SERPL-CCNC: 18 U/L (ref 1–40)
BILIRUB SERPL-MCNC: 0.5 MG/DL (ref 0–1.2)
BUN SERPL-MCNC: 18 MG/DL (ref 6–20)
BUN/CREAT SERPL: 13.8 (ref 7–25)
CALCIUM SPEC-SCNC: 9.9 MG/DL (ref 8.6–10.5)
CHLORIDE SERPL-SCNC: 104 MMOL/L (ref 98–107)
CHOLEST SERPL-MCNC: 114 MG/DL (ref 0–200)
CO2 SERPL-SCNC: 24.9 MMOL/L (ref 22–29)
CREAT SERPL-MCNC: 1.3 MG/DL (ref 0.76–1.27)
EGFRCR SERPLBLD CKD-EPI 2021: 67.3 ML/MIN/1.73
GLOBULIN UR ELPH-MCNC: 2.9 GM/DL
GLUCOSE SERPL-MCNC: 127 MG/DL (ref 65–99)
HDLC SERPL-MCNC: 36 MG/DL (ref 40–60)
LDLC SERPL CALC-MCNC: 44 MG/DL (ref 0–100)
LDLC/HDLC SERPL: 0.97 {RATIO}
POTASSIUM SERPL-SCNC: 4.1 MMOL/L (ref 3.5–5.2)
PROT SERPL-MCNC: 7.5 G/DL (ref 6–8.5)
SODIUM SERPL-SCNC: 137 MMOL/L (ref 136–145)
TRIGL SERPL-MCNC: 215 MG/DL (ref 0–150)
VLDLC SERPL-MCNC: 34 MG/DL (ref 5–40)

## 2024-04-25 PROCEDURE — 80061 LIPID PANEL: CPT

## 2024-04-25 PROCEDURE — 36415 COLL VENOUS BLD VENIPUNCTURE: CPT

## 2024-04-25 PROCEDURE — 80053 COMPREHEN METABOLIC PANEL: CPT

## 2024-04-26 ENCOUNTER — TREATMENT (OUTPATIENT)
Dept: CARDIAC REHAB | Facility: HOSPITAL | Age: 49
End: 2024-04-26
Payer: COMMERCIAL

## 2024-04-26 DIAGNOSIS — I21.3 ST ELEVATION MYOCARDIAL INFARCTION (STEMI), UNSPECIFIED ARTERY: Primary | ICD-10-CM

## 2024-04-26 PROCEDURE — 93798 PHYS/QHP OP CAR RHAB W/ECG: CPT

## 2024-04-29 ENCOUNTER — TREATMENT (OUTPATIENT)
Dept: CARDIAC REHAB | Facility: HOSPITAL | Age: 49
End: 2024-04-29
Payer: COMMERCIAL

## 2024-04-29 DIAGNOSIS — I21.3 ST ELEVATION MYOCARDIAL INFARCTION (STEMI), UNSPECIFIED ARTERY: Primary | ICD-10-CM

## 2024-04-29 PROCEDURE — 93798 PHYS/QHP OP CAR RHAB W/ECG: CPT

## 2024-05-01 ENCOUNTER — TREATMENT (OUTPATIENT)
Dept: CARDIAC REHAB | Facility: HOSPITAL | Age: 49
End: 2024-05-01
Payer: COMMERCIAL

## 2024-05-01 DIAGNOSIS — I21.3 ST ELEVATION MYOCARDIAL INFARCTION (STEMI), UNSPECIFIED ARTERY: Primary | ICD-10-CM

## 2024-05-01 PROCEDURE — 93797 PHYS/QHP OP CAR RHAB WO ECG: CPT

## 2024-05-01 PROCEDURE — 93798 PHYS/QHP OP CAR RHAB W/ECG: CPT

## 2024-05-01 NOTE — PROGRESS NOTES
Attended Phase II Cardiac Rehab. No medication or health history changes reported. See [facility] for details.    Exercise Session #31  RD Appointment #32  Total time: 120 minutes

## 2024-05-01 NOTE — PROGRESS NOTES
NUTRITION ASSESSMENT & EDUCATION  CARDIAC/PULMONARY REHAB      NUTRITION FOLLOW UP #2    Appointment Date and Time: 03/06/24, 08:54 EST  Patient Name: Jnoo Mcintyre   Age: 48 y.o.     Sex:  male                                        This is a second follow up nutrition counseling today. Jono requests updated goals as this helps keep him actively working on his goals.    He is currently on target dose of Ozempic. Ozempic was started on 2/14, he continues with a decrease in appetite that allows better control of his food choices and portion control.  Jono is down a total of 11 lbs since our initial visit 2/14, 12 weeks ago.     Nutrition Influences:     Jono lives with: wife and children  Jono receives lifestyle support from others at home?: Yes  Spouse/significant other present for diet instruction today?: No    Diet Assessment:     Meal intake pattern:  3 meals/day  Dining out:  often twice daily               Fast food:  occasional    Jono has continued to keep weekly detailed food diaries.  This is a busy family and they do not have home cooked sit down meals in generally.   Jono has been having oatmeal and diet cola for breakfast on most days of the week. He does eat out lunch and dinner.   Week review finds he is selecting non fried fish more often, chili w/beans, fruit at least 3 times/week. We discussed opportunities to choose more vegetables, less grilled cheese.       Motivation level toward diet compliance:  strong  Assessment / Recommendations:   Prior diet education before to coming to Cardiac Rehab?: 2/14/24, 3/6/24 with this RD  Jono renewed his determination to eat healthy. He completed a brief intake assessment that showed his progress (REAPS- rapid eating assessment for participants)  His new goals are to continue work on getting adequate fruit and vegetables, increase grilled fish, decrease ordering grilled cheese sandwiches.   He will finish cardiac rehab in 4 more sessions and was interested  in further follow up with an RD. I did refer him to [facility] outpatient RD counseling.     Plan:  Encouraged to complete goals and continue setting new nutrition/exercise goals            Rylee Campos RD, 08:54 EST - 5/1/2024

## 2024-05-06 ENCOUNTER — TREATMENT (OUTPATIENT)
Dept: CARDIAC REHAB | Facility: HOSPITAL | Age: 49
End: 2024-05-06
Payer: COMMERCIAL

## 2024-05-06 DIAGNOSIS — I21.3 ST ELEVATION MYOCARDIAL INFARCTION (STEMI), UNSPECIFIED ARTERY: Primary | ICD-10-CM

## 2024-05-06 PROCEDURE — 93798 PHYS/QHP OP CAR RHAB W/ECG: CPT

## 2024-05-08 ENCOUNTER — TREATMENT (OUTPATIENT)
Dept: CARDIAC REHAB | Facility: HOSPITAL | Age: 49
End: 2024-05-08
Payer: COMMERCIAL

## 2024-05-08 DIAGNOSIS — I21.3 ST ELEVATION MYOCARDIAL INFARCTION (STEMI), UNSPECIFIED ARTERY: Primary | ICD-10-CM

## 2024-05-08 PROCEDURE — 93798 PHYS/QHP OP CAR RHAB W/ECG: CPT

## 2024-05-10 ENCOUNTER — TELEPHONE (OUTPATIENT)
Dept: CARDIOLOGY | Facility: CLINIC | Age: 49
End: 2024-05-10
Payer: COMMERCIAL

## 2024-05-10 ENCOUNTER — TREATMENT (OUTPATIENT)
Dept: CARDIAC REHAB | Facility: HOSPITAL | Age: 49
End: 2024-05-10
Payer: COMMERCIAL

## 2024-05-10 DIAGNOSIS — I21.3 ST ELEVATION MYOCARDIAL INFARCTION (STEMI), UNSPECIFIED ARTERY: Primary | ICD-10-CM

## 2024-05-10 PROCEDURE — 93798 PHYS/QHP OP CAR RHAB W/ECG: CPT

## 2024-05-10 NOTE — TELEPHONE ENCOUNTER
Caller: Jono Mcintyre    Relationship: Self    Best call back number: [phone]    What is the best time to reach you: ANY    Who are you requesting to speak with (clinical staff, provider,  specific staff member): CLINICAL      What was the call regarding: PATIENT CALLED TO INQUIRE IF IT IS SAFE FOR HIM TO RECEIVE A MASSAGE AND SIT IN A STEAM ROOM. DUE TO BEING ON BLOOD THINNERS. PLEASE CONTACT PATIENT AND ADVISE ON THIS ISSUE.    Is it okay if the provider responds through MyChart: YES

## 2024-05-13 ENCOUNTER — TREATMENT (OUTPATIENT)
Dept: CARDIAC REHAB | Facility: HOSPITAL | Age: 49
End: 2024-05-13
Payer: COMMERCIAL

## 2024-05-13 DIAGNOSIS — I21.3 ST ELEVATION MYOCARDIAL INFARCTION (STEMI), UNSPECIFIED ARTERY: Primary | ICD-10-CM

## 2024-05-13 PROCEDURE — 93798 PHYS/QHP OP CAR RHAB W/ECG: CPT

## 2024-05-15 ENCOUNTER — APPOINTMENT (OUTPATIENT)
Dept: CARDIAC REHAB | Facility: HOSPITAL | Age: 49
End: 2024-05-15
Payer: COMMERCIAL

## 2024-05-15 ENCOUNTER — TRANSCRIBE ORDERS (OUTPATIENT)
Dept: CARDIAC REHAB | Facility: HOSPITAL | Age: 49
End: 2024-05-15
Payer: COMMERCIAL

## 2024-05-15 ENCOUNTER — TREATMENT (OUTPATIENT)
Dept: CARDIAC REHAB | Facility: HOSPITAL | Age: 49
End: 2024-05-15

## 2024-05-15 DIAGNOSIS — Z00.00 PREVENTATIVE HEALTH CARE: ICD-10-CM

## 2024-05-15 DIAGNOSIS — Z00.00 PREVENTATIVE HEALTH CARE: Primary | ICD-10-CM

## 2024-05-17 ENCOUNTER — TREATMENT (OUTPATIENT)
Dept: CARDIAC REHAB | Facility: HOSPITAL | Age: 49
End: 2024-05-17

## 2024-05-17 ENCOUNTER — APPOINTMENT (OUTPATIENT)
Dept: CARDIAC REHAB | Facility: HOSPITAL | Age: 49
End: 2024-05-17
Payer: COMMERCIAL

## 2024-05-17 DIAGNOSIS — Z00.00 PREVENTATIVE HEALTH CARE: Primary | ICD-10-CM

## 2024-05-20 ENCOUNTER — TREATMENT (OUTPATIENT)
Dept: CARDIAC REHAB | Facility: HOSPITAL | Age: 49
End: 2024-05-20

## 2024-05-20 DIAGNOSIS — Z00.00 PREVENTATIVE HEALTH CARE: Primary | ICD-10-CM

## 2024-05-22 ENCOUNTER — TREATMENT (OUTPATIENT)
Dept: CARDIAC REHAB | Facility: HOSPITAL | Age: 49
End: 2024-05-22

## 2024-05-22 DIAGNOSIS — Z00.00 PREVENTATIVE HEALTH CARE: Primary | ICD-10-CM

## 2024-05-29 ENCOUNTER — TREATMENT (OUTPATIENT)
Dept: CARDIAC REHAB | Facility: HOSPITAL | Age: 49
End: 2024-05-29

## 2024-05-29 DIAGNOSIS — Z00.00 PREVENTATIVE HEALTH CARE: Primary | ICD-10-CM

## 2024-05-31 ENCOUNTER — TREATMENT (OUTPATIENT)
Dept: CARDIAC REHAB | Facility: HOSPITAL | Age: 49
End: 2024-05-31

## 2024-05-31 DIAGNOSIS — Z00.00 PREVENTATIVE HEALTH CARE: Primary | ICD-10-CM

## 2024-11-21 ENCOUNTER — TRANSCRIBE ORDERS (OUTPATIENT)
Dept: NUTRITION | Facility: HOSPITAL | Age: 49
End: 2024-11-21
Payer: COMMERCIAL

## 2024-11-21 DIAGNOSIS — E16.2 HYPOGLYCEMIA, UNSPECIFIED: Primary | ICD-10-CM

## 2024-11-22 PROBLEM — I25.10 CORONARY ARTERY DISEASE INVOLVING NATIVE CORONARY ARTERY OF NATIVE HEART WITHOUT ANGINA PECTORIS: Status: ACTIVE | Noted: 2024-11-22

## 2024-11-22 PROBLEM — E78.5 DYSLIPIDEMIA: Status: ACTIVE | Noted: 2024-11-22

## 2024-11-22 NOTE — PROGRESS NOTES
[facility] Cardiology  Subjective:     Encounter Date: 11/25/2024      Patient ID: Jono Mcintyre is a 49 y.o. male.    Chief Complaint: Coronary artery disease involving native coronary artery of      PROBLEM LIST:  Coronary artery disease,   [facility], 02/06/2024: EF 65%. Acute inferior STEMI and occluded RCA.  Treated 3.5 x 28 Xience ALEC postdilated 4.0 NC trek. 30% distal RCA, mid LAD, and mid LCx.  [facility], 02/07/2024: EF 60%. Normal.   Type 2 diabetes  Hypertension  Hyperlipidemia  Obstructive sleep apnea  Gout  GERD  DVT in setting of COVID 2020.      History of Present Illness  Jono Mcintyre returns today for a follow up with a history of CAD and cardiac risk factors. Since last visit, patient has been doing well overall from a cardiovascular standpoint. He stays busy and active by working, strength training, using his treadmill, and playing pickleball 2 to 3 times weekly. Patient monitors his glucose levels regularly and notes that following lunch it is typically low around 70. He states he was advised to not take Ozempic this week as he was started on Jardiance. Patient denies chest pain, shortness of breath, orthopnea, palpitations, edema, dizziness, and syncope.     Allergies   Allergen Reactions    No Known Drug Allergy          Current Outpatient Medications:     allopurinol (ZYLOPRIM) 100 MG tablet, Take 1 tablet by mouth Daily., Disp: , Rfl: 0    aspirin 81 MG EC tablet, Take 1 tablet by mouth Daily., Disp: 30 tablet, Rfl: 11    benazepril (LOTENSIN) 20 MG tablet, Take 1 tablet by mouth Daily., Disp: , Rfl:     cetirizine (ZYRTEC ALLERGY) 10 MG tablet, Take 1 tablet by mouth Daily As Needed., Disp: , Rfl:     clopidogrel (PLAVIX) 75 MG tablet, Take 1 tablet by mouth Daily., Disp: 30 tablet, Rfl: 11    Continuous Glucose Sensor (Dexcom G7 Sensor) misc, See Admin Instructions., Disp: , Rfl:     empagliflozin (JARDIANCE) 10 MG tablet tablet, Take 1 tablet by mouth Daily., Disp:  "30 tablet, Rfl: 11    famotidine (PEPCID) 20 MG tablet, Take 1 tablet by mouth 2 (Two) Times a Day. Patient states he takes 20mg in the morning always and then takes the other 20mg on occasion if he has a flare with his GERD., Disp: , Rfl:     fluticasone (FLONASE ALLERGY RELIEF) 50 MCG/ACT nasal spray, Flonase Allergy Relief 50 mcg/actuation nasal spray,suspension  Spray 1 spray every day by intranasal route as needed for 7 days., Disp: , Rfl:     Lancets (OneTouch Delica Plus [id number]) misc, USE TO TEST BLOOD SUGAR ONCE A DAY AS DIRECTED, Disp: , Rfl:     metoprolol succinate XL (TOPROL-XL) 25 MG 24 hr tablet, Take 1 tablet by mouth Daily., Disp: 30 tablet, Rfl: 11    nitroglycerin (NITROSTAT) 0.4 MG SL tablet, Place 1 tablet under the tongue Every 5 (Five) Minutes As Needed for Chest Pain (Only if SBP Greater Than 100). Take no more than 3 doses in 15 minutes., Disp: 100 tablet, Rfl: 2    rosuvastatin (CRESTOR) 20 MG tablet, Take 1 tablet by mouth Every Night., Disp: 30 tablet, Rfl: 11    Semaglutide (OZEMPIC, 0.25 OR 0.5 MG/DOSE, SC), Inject 0.25 mg under the skin into the appropriate area as directed 1 (One) Time Per Week. (Patient taking differently: Inject 1 mg under the skin into the appropriate area as directed 1 (One) Time Per Week.), Disp: , Rfl:     The following portions of the patient's history were reviewed and updated as appropriate: allergies, current medications, past family history, past medical history, past social history, past surgical history and problem list.    ROS       Objective:     Vitals:    11/25/24 0856   BP: 130/84   BP Location: Left arm   Patient Position: Sitting   Cuff Size: Adult   Pulse: 96   SpO2: 96%   Weight: 109 kg (239 lb 3.2 oz)   Height: 177.8 cm (70\")         Vitals reviewed.   Constitutional:       Appearance: Well-developed and not in distress.   Neck:      Thyroid: No thyromegaly.      Vascular: No carotid bruit or JVD.   Pulmonary:      Breath sounds: Normal breath " sounds.   Cardiovascular:      Regular rhythm.      No gallop. No S3 and S4 gallop.   Pulses:     Intact distal pulses.      Carotid: 2+ bilaterally.     Radial: 2+ bilaterally.  Edema:     Peripheral edema absent.   Abdominal:      General: Bowel sounds are normal.      Palpations: Abdomen is soft. There is no abdominal mass.      Tenderness: There is no abdominal tenderness.   Musculoskeletal:         General: No deformity.      Extremities: No clubbing present.Skin:     General: Skin is warm and dry.      Findings: No rash.   Neurological:      Mental Status: Alert and oriented to person, place, and time.         Lab Review:  Lab Results   Component Value Date    GLUCOSE 127 (H) 04/25/2024    BUN 18 04/25/2024    CREATININE 1.30 (H) 04/25/2024    BCR 13.8 04/25/2024    K 4.1 04/25/2024    CO2 24.9 04/25/2024    CALCIUM 9.9 04/25/2024    ALBUMIN 4.6 04/25/2024    ALKPHOS 94 04/25/2024    AST 18 04/25/2024    ALT 29 04/25/2024     Lab Results   Component Value Date    CHOL 114 04/25/2024    TRIG 215 (H) 04/25/2024    HDL 36 (L) 04/25/2024    LDL 44 04/25/2024      Lab Results   Component Value Date    WBC 7.98 02/08/2024    RBC 5.34 02/08/2024    HGB 15.7 02/08/2024    HCT 47.2 02/08/2024    MCV 88.4 02/08/2024     02/08/2024     Lab Results   Component Value Date    TSH 0.650 02/07/2024     Lab Results   Component Value Date    HGBA1C 8.80 (H) 02/06/2024        Procedures      Advance Care Planning              Assessment:   Diagnoses and all orders for this visit:    1. Coronary artery disease involving native coronary artery of native heart without angina pectoris (Primary)    2. Essential hypertension    3. Dyslipidemia        Impression:  1. Coronary artery disease. Stable without angina on current activity. Continue on aspirin 81 mg for antiplatelet therapy. Continue on Plavix 75 mg daily for antiplatelet therapy. Continue on nitroglycerin 0.4 mg PRN for chest pain.    2. Essential hypertension. Well  controlled. Continue on metoprolol 25 mg daily for rate control and hypertension.     3. Dyslipidemia. Well controlled. Continue on rosuvastatin 20 mg daily for hyperlipidemia.     4.  Type 2 diabetes on Jardiance and Ozempic.  Now with some occasional hypoglycemic events.  Likely due to recent weight loss and increasing physical activity.    Plan:  Stable cardiac status.   Patient was encouraged to continue to be active and have a healthy diet.  Advised patient that he is able to discontinue Plavix after 02/06/2025 but to continue on aspirin 81 mg daily.   Continue current medications.  To discuss diabetes medications with primary physician.  If no longer needs to agents, from a cardiovascular standpoint, likely is more benefit to him from the GLP-1 agonist been Jardiance  Revisit in 12 MO, or sooner as needed.          Scribed for Raj Lawton MD by Malika Laguerre. 11/25/2024 09:10 EST  I have seen and examined the patient, I have reviewed the note, discussed the case with the advance practice clinician, made necessary changes and I agree with the final note.    Raj Lawton MD  11/25/24  10:17 EST      Please note that portions of this note may have been completed with a voice recognition program. Efforts were made to edit the dictations, but occasionally words are mistranscribed.

## 2024-11-25 ENCOUNTER — OFFICE VISIT (OUTPATIENT)
Dept: CARDIOLOGY | Facility: CLINIC | Age: 49
End: 2024-11-25
Payer: COMMERCIAL

## 2024-11-25 VITALS
HEIGHT: 70 IN | BODY MASS INDEX: 34.24 KG/M2 | DIASTOLIC BLOOD PRESSURE: 84 MMHG | WEIGHT: 239.2 LBS | SYSTOLIC BLOOD PRESSURE: 130 MMHG | HEART RATE: 96 BPM | OXYGEN SATURATION: 96 %

## 2024-11-25 DIAGNOSIS — I10 ESSENTIAL HYPERTENSION: ICD-10-CM

## 2024-11-25 DIAGNOSIS — I25.10 CORONARY ARTERY DISEASE INVOLVING NATIVE CORONARY ARTERY OF NATIVE HEART WITHOUT ANGINA PECTORIS: Primary | ICD-10-CM

## 2024-11-25 DIAGNOSIS — E78.5 DYSLIPIDEMIA: ICD-10-CM

## 2024-11-25 PROCEDURE — 99214 OFFICE O/P EST MOD 30 MIN: CPT | Performed by: INTERNAL MEDICINE

## 2024-11-25 RX ORDER — ACYCLOVIR 400 MG/1
TABLET ORAL SEE ADMIN INSTRUCTIONS
COMMUNITY
Start: 2024-11-20

## 2024-11-25 RX ORDER — LANCETS 33 GAUGE
EACH MISCELLANEOUS
COMMUNITY
Start: 2024-11-20

## 2024-11-26 ENCOUNTER — TRANSCRIBE ORDERS (OUTPATIENT)
Dept: DIABETES SERVICES | Facility: HOSPITAL | Age: 49
End: 2024-11-26
Payer: COMMERCIAL

## 2024-11-26 DIAGNOSIS — E11.9 DIABETES MELLITUS WITHOUT COMPLICATION: Primary | ICD-10-CM

## 2024-12-13 ENCOUNTER — HOSPITAL ENCOUNTER (OUTPATIENT)
Dept: DIABETES SERVICES | Facility: HOSPITAL | Age: 49
Setting detail: RECURRING SERIES
Discharge: HOME | End: 2024-12-13
Payer: COMMERCIAL

## 2024-12-13 PROCEDURE — G0109 DIAB MANAGE TRN IND/GROUP: HCPCS

## 2024-12-13 NOTE — CONSULTS
Patient attended their scheduled 2 hour diabetes education visit.  Please see media tab for assessment and notes if you use EP[employer]. If you are not an EPIC user a copy of patient's assessment and notes will be sent per routine. Thank you.

## 2025-01-08 RX ORDER — METOPROLOL SUCCINATE 25 MG/1
25 TABLET, EXTENDED RELEASE ORAL DAILY
Qty: 90 TABLET | Refills: 3 | Status: SHIPPED | OUTPATIENT
Start: 2025-01-08

## 2025-01-08 RX ORDER — CLOPIDOGREL BISULFATE 75 MG/1
75 TABLET ORAL DAILY
Qty: 90 TABLET | Refills: 3 | Status: SHIPPED | OUTPATIENT
Start: 2025-01-08

## 2025-01-23 ENCOUNTER — HOSPITAL ENCOUNTER (OUTPATIENT)
Dept: DIABETES SERVICES | Facility: HOSPITAL | Age: 50
Setting detail: RECURRING SERIES
Discharge: HOME | End: 2025-01-23
Payer: COMMERCIAL

## 2025-01-23 NOTE — CONSULTS
This medical referred consult was provided as a telephone call, tele-health or e-visit. Consent for treatment was given verbally.  Patient attended their scheduled 30 minute diabetes education visit.  Please see media tab for assessment and notes if you use EPIC. If you are not an EPIC user a copy of patient's assessment and notes will be sent per routine. Thank you.

## 2025-04-07 RX ORDER — ASPIRIN 81 MG/1
81 TABLET, COATED ORAL DAILY
Qty: 90 TABLET | Refills: 3 | Status: SHIPPED | OUTPATIENT
Start: 2025-04-07

## 2025-05-01 ENCOUNTER — DOCUMENTATION (OUTPATIENT)
Dept: DIABETES SERVICES | Facility: HOSPITAL | Age: 50
End: 2025-05-01
Payer: COMMERCIAL

## 2025-05-01 NOTE — PLAN OF CARE
6 mos follow up completed from previous diabetes education class. Patient requesting further follow up with RD to address nutritional needs. Patient to be contacted by RD and follow up will be made. The was a free follow up/ no charge follow up.

## (undated) DEVICE — PK CATH CARD 10

## (undated) DEVICE — KT CONTRST INJ ISOL/MANFLD W/TRANSDCR

## (undated) DEVICE — INTRO GLIDESHEATH .021 6F 25CM

## (undated) DEVICE — GW PERIPH GUIDERIGHT STD/EXCHNG/J/TIP SS 0.035IN 5X260CM

## (undated) DEVICE — DEV COMPR RAD TR BND REG 24CM

## (undated) DEVICE — CATH GUIDE MACH I NO/SH 6F FR4

## (undated) DEVICE — CATH DIAG EXPO .056 FL3.5 6F 100CM

## (undated) DEVICE — KT CONTRL HND ACIST CVI PREM HIPRESS 65

## (undated) DEVICE — Device

## (undated) DEVICE — DEV INFL MONARCH 25W

## (undated) DEVICE — CATH DIAG EXPO M/ PK 6FR FL4/FR4 PIG 3PK

## (undated) DEVICE — ELECTRD DEFIB ZOLL/CONN RADIOPRNT LG/BKPAD A/